# Patient Record
Sex: FEMALE | Race: WHITE | NOT HISPANIC OR LATINO | Employment: OTHER | URBAN - METROPOLITAN AREA
[De-identification: names, ages, dates, MRNs, and addresses within clinical notes are randomized per-mention and may not be internally consistent; named-entity substitution may affect disease eponyms.]

---

## 2019-02-19 ENCOUNTER — OFFICE VISIT (OUTPATIENT)
Dept: FAMILY MEDICINE CLINIC | Facility: CLINIC | Age: 84
End: 2019-02-19

## 2019-02-19 VITALS
DIASTOLIC BLOOD PRESSURE: 80 MMHG | RESPIRATION RATE: 16 BRPM | HEART RATE: 83 BPM | WEIGHT: 171 LBS | OXYGEN SATURATION: 98 % | HEIGHT: 59 IN | SYSTOLIC BLOOD PRESSURE: 134 MMHG | BODY MASS INDEX: 34.47 KG/M2

## 2019-02-19 DIAGNOSIS — M25.511 CHRONIC PAIN OF BOTH SHOULDERS: ICD-10-CM

## 2019-02-19 DIAGNOSIS — Z00.00 MEDICARE ANNUAL WELLNESS VISIT, INITIAL: Primary | ICD-10-CM

## 2019-02-19 DIAGNOSIS — M79.89 RIGHT LEG SWELLING: ICD-10-CM

## 2019-02-19 DIAGNOSIS — M25.512 CHRONIC PAIN OF BOTH SHOULDERS: ICD-10-CM

## 2019-02-19 DIAGNOSIS — M48.00 SPINAL STENOSIS, UNSPECIFIED SPINAL REGION: ICD-10-CM

## 2019-02-19 DIAGNOSIS — Z86.19 HX OF HEPATITIS: ICD-10-CM

## 2019-02-19 DIAGNOSIS — G89.29 CHRONIC PAIN OF BOTH SHOULDERS: ICD-10-CM

## 2019-02-19 DIAGNOSIS — R53.82 CHRONIC FATIGUE: ICD-10-CM

## 2019-02-19 DIAGNOSIS — M62.81 HAND MUSCLE WEAKNESS: ICD-10-CM

## 2019-02-19 PROBLEM — K75.9 HEPATITIS: Status: ACTIVE | Noted: 2019-02-19

## 2019-02-19 PROBLEM — M15.9 OSTEOARTHRITIS OF MULTIPLE JOINTS: Status: ACTIVE | Noted: 2019-02-19

## 2019-02-19 PROCEDURE — 99214 OFFICE O/P EST MOD 30 MIN: CPT | Performed by: FAMILY MEDICINE

## 2019-02-19 PROCEDURE — G0439 PPPS, SUBSEQ VISIT: HCPCS | Performed by: FAMILY MEDICINE

## 2019-02-19 RX ORDER — MELOXICAM 7.5 MG/1
7.5 TABLET ORAL DAILY
Refills: 1 | COMMUNITY
Start: 2019-01-17 | End: 2019-05-23

## 2019-02-19 NOTE — PATIENT INSTRUCTIONS
Medicare Wellness  Personal Prevention Plan of Service   I will call you with results.   Date of Office Visit:  2019  Encounter Provider:  Hank Saunders MD  Place of Service:  [facility]  Patient Name: Erum Yang  :  1933    As part of the Medicare Wellness portion of your visit today, we are providing you with this personalized preventive plan of services (PPPS). This plan is based upon recommendations of the [place] Preventive Services Task Force (USPSTF) and the Advisory Committee on Immunization Practices (ACIP).    This lists the preventive care services that should be considered, and provides dates of when you are due. Items listed as completed are up-to-date and do not require any further intervention.    Health Maintenance   Topic Date Due   • TDAP/TD VACCINES (1 - Tdap) 1952   • ZOSTER VACCINE (1 of 2) 1983   • PNEUMOCOCCAL VACCINES (65+ LOW/MEDIUM RISK) (1 of 2 - PCV13) 1998   • ANNUAL PHYSICAL  2020   • INFLUENZA VACCINE  Completed       Orders Placed This Encounter   Procedures   • CBC (No Diff)   • Comprehensive Metabolic Panel   • Lipid Panel With / Chol / HDL Ratio   • Hepatitis C Antibody   • Ambulatory Referral to Orthopedic Surgery     Referral Priority:   Routine     Referral Type:   Consultation     Referral Reason:   Specialty Services Required     Referred to Provider:   Artem Franco MD     Requested Specialty:   Orthopedic Surgery     Number of Visits Requested:   1       Return in about 3 months (around 2019) for re-evaluation.

## 2019-02-19 NOTE — PROGRESS NOTES
Medicare Subsequent Wellness Visit  Subjective   History of Present Illness    Erum Yang is a 85 y.o. female who presents for an Medicare Wellness Visit. In addition, we addressed the following health issues:  She is very fatigued and wants to be evaluated for why she might be feeling that way.  Shoulder OA:  Decreased ROM last October, saw Dr. Bhandari, received a cortisone injection in each shoulder. This was initially helpful. However, for the last few weeks, both shoulders have been more painful and she has noticed further limited ROM. She also now has bilateral wrist and hand pain along with bilateral hand weakness. States it is diffucult to brush her hair and drive due to the arm pain. Has tried Meloxicam and TENS unit for pain relief, states neither were remarkably helpful. Stopped PT a few weeks ago due to pain. She is interested in having this shoulder examined to see if it is Synovitis and requires anitbiotic treatment. Denies fever, chills, swelling, or erythema.     R hip OA  Intermittent R hip pain. States this developed approximately 6 mo ago. She saw Dr. Bhandari for this and had x-rays but not a cortisone injection. States R hip pain tolerable but it does keep her from traveling and walking more. She loves traveling and lived in [place] for a year.     R foot/lower leg swelling.   Noticed swelling from foot to mid calf approx 1 mo ago. Swelling always present even when she elevates her legs. Denies pain at rest or with ambulation. States she cannot wear her normal shoes bc of swelling. Denies recent car trips or flights. Denies calf pain or tenderness.  Had foot surgery 5 years ago but has not had any swelling since then until now.       She is interested in having blood work today. [facility] She worked as an ICU nurse and was told then that she was positive for hepatitis but that she did not need treatment as her antibody count was high. She thinks that it might that she had  Hepatitits B.        PMH, PSH, SocHx, FamHx, Allergies, and Medications: Reviewed and updated in the history section of chart.  History reviewed. No pertinent family history.    Social History     Social History Narrative    Lives at home with sister.        No Known Allergies    Outpatient Medications Prior to Visit   Medication Sig Dispense Refill   • meloxicam (MOBIC) 7.5 MG tablet Take 7.5 mg by mouth Daily.  1     No facility-administered medications prior to visit.         Patient Active Problem List   Diagnosis   • Osteoarthritis of multiple joints   • Hx of hepatitis         Patient Care Team:  Hank Saunders MD as PCP - General (Family Medicine)  Health Habits:  Current Diet: Well balanced diet  Dental Exam. UTD  Eye Exam. Needs to schedule  Exercise: Denies currently  Current exercise activities include: went to PT 3xweek for several months for shoulder and hip pain.     Recent Hospitalizations:  none    Age-appropriate Screening Schedule:  Refer to the list below for future screening recommendations based on patient's age. Orders for these recommended tests are listed in the plan section. The patient has been provided with a written plan.    Health Maintenance   Topic Date Due   • TDAP/TD VACCINES (1 - Tdap) 12/12/1952   • ZOSTER VACCINE (1 of 2) 12/12/1983   • PNEUMOCOCCAL VACCINES (65+ LOW/MEDIUM RISK) (1 of 2 - PCV13) 12/12/1998   • ANNUAL PHYSICAL  02/20/2020   • INFLUENZA VACCINE  Completed       Depression Screen:   PHQ-2/PHQ-9 Depression Screening 2/19/2019   Little interest or pleasure in doing things 0   Feeling down, depressed, or hopeless 0   Total Score 0       Functional and Cognitive Screening:  Functional & Cognitive Status 2/19/2019   Do you have difficulty preparing food and eating? No   Do you have difficulty bathing yourself, getting dressed or grooming yourself? No   Do you have difficulty using the toilet? No   Do you have difficulty moving around from place to place? Yes   Do  "you have trouble with steps or getting out of a bed or a chair? Yes   In the past year have you fallen or experienced a near fall? Yes   Current Diet Well Balanced Diet   Dental Exam Up to date   Eye Exam Not up to date   Exercise (times per week) 0 times per week   Current Exercise Activities Include None   Do you need help using the phone?  No   Are you deaf or do you have serious difficulty hearing?  Yes   Do you need help with transportation? No   Do you need help shopping? No   Do you need help preparing meals?  No   Do you need help with housework?  Yes   Do you need help with laundry? Yes   Do you need help taking your medications? No   Do you need help managing money? No   Do you ever drive or ride in a car without wearing a seat belt? No   Have you felt unusual stress, anger or loneliness in the last month? No   Who do you live with? Sibling   If you need help, do you have trouble finding someone available to you? No   Have you been bothered in the last four weeks by sexual problems? No   Do you have difficulty concentrating, remembering or making decisions? No     Does the patient have evidence of cognitive impairment? none      Review of Systems   Constitutional: Negative.    HENT: Negative.    Eyes: Negative.    Respiratory: Negative.    Cardiovascular: Negative.    Gastrointestinal: Negative.    Endocrine: Negative.    Genitourinary: Negative.    Musculoskeletal: Positive for back pain and joint swelling.   Skin: Negative.    Allergic/Immunologic: Negative.    Neurological: Negative.    Hematological: Negative.    Psychiatric/Behavioral: Negative.        Objective     Vitals:    02/19/19 1414   BP: 134/80   Pulse: 83   Resp: 16   SpO2: 98%   Weight: 77.6 kg (171 lb)   Height: 149.9 cm (59\")       Body mass index is 34.54 kg/m².    PHYSICAL EXAM  Vitals reviewed and on chart.  HEENT: PERRLA, EOMI. Oral mucosa moist,   No LAD.  CV: RRR, systolic murmurs, rubs, clicks or gallops  LUNGS: CTA " bilaterally  EXT: Edema in right leg, unable to raise arms at shoulders, weakness of bilateral hands 2/5. She walks bent over with hands on thighs.   NEURO: CN II - XII grossly intact        ASSESSMENT AND PLAN      Problem List Items Addressed This Visit        Other    Hx of hepatitis      Other Visit Diagnoses     Medicare annual wellness visit, initial    -  Primary    Relevant Orders    Comprehensive Metabolic Panel    Lipid Panel With / Chol / HDL Ratio    Hepatitis C Antibody    Right leg swelling        Relevant Orders    Duplex Venous Lower Extremity - Right CAR    Chronic pain of both shoulders        Relevant Orders    Ambulatory Referral to Orthopedic Surgery (Completed)    Hand muscle weakness        Relevant Orders    Ambulatory Referral to Orthopedic Surgery (Completed)    Chronic fatigue        Relevant Orders    CBC (No Diff)    Spinal stenosis, unspecified spinal region              Orders:  Orders Placed This Encounter   Procedures   • CBC (No Diff)   • Comprehensive Metabolic Panel   • Lipid Panel With / Chol / HDL Ratio   • Hepatitis C Antibody   • Ambulatory Referral to Orthopedic Surgery       Follow Up:  Return in about 3 months (around 5/19/2019) for re-evaluation     ADVANCED DIRECTIVES: yes and will send copy to the office.     An After Visit Summary and PPPS with all of these plans were given to the patient.

## 2019-02-20 LAB
ALBUMIN SERPL-MCNC: 4 G/DL (ref 3.5–4.7)
ALBUMIN/GLOB SERPL: 1.4 {RATIO} (ref 1.2–2.2)
ALP SERPL-CCNC: 110 IU/L (ref 39–117)
ALT SERPL-CCNC: 10 IU/L (ref 0–32)
AST SERPL-CCNC: 12 IU/L (ref 0–40)
BILIRUB SERPL-MCNC: 0.5 MG/DL (ref 0–1.2)
BUN SERPL-MCNC: 14 MG/DL (ref 8–27)
BUN/CREAT SERPL: 25 (ref 12–28)
CALCIUM SERPL-MCNC: 9.3 MG/DL (ref 8.7–10.3)
CHLORIDE SERPL-SCNC: 99 MMOL/L (ref 96–106)
CHOLEST SERPL-MCNC: 164 MG/DL (ref 100–199)
CHOLEST/HDLC SERPL: 2.4 RATIO (ref 0–4.4)
CO2 SERPL-SCNC: 23 MMOL/L (ref 20–29)
CREAT SERPL-MCNC: 0.56 MG/DL (ref 0.57–1)
ERYTHROCYTE [DISTWIDTH] IN BLOOD BY AUTOMATED COUNT: 14.6 % (ref 12.3–15.4)
GLOBULIN SER CALC-MCNC: 2.8 G/DL (ref 1.5–4.5)
GLUCOSE SERPL-MCNC: 87 MG/DL (ref 65–99)
HCT VFR BLD AUTO: 33.7 % (ref 34–46.6)
HCV AB S/CO SERPL IA: <0.1 S/CO RATIO (ref 0–0.9)
HDLC SERPL-MCNC: 69 MG/DL
HGB BLD-MCNC: 11.2 G/DL (ref 11.1–15.9)
LDLC SERPL CALC-MCNC: 85 MG/DL (ref 0–99)
MCH RBC QN AUTO: 27.5 PG (ref 26.6–33)
MCHC RBC AUTO-ENTMCNC: 33.2 G/DL (ref 31.5–35.7)
MCV RBC AUTO: 83 FL (ref 79–97)
PLATELET # BLD AUTO: 398 X10E3/UL (ref 150–379)
POTASSIUM SERPL-SCNC: 4.4 MMOL/L (ref 3.5–5.2)
PROT SERPL-MCNC: 6.8 G/DL (ref 6–8.5)
RBC # BLD AUTO: 4.08 X10E6/UL (ref 3.77–5.28)
SODIUM SERPL-SCNC: 138 MMOL/L (ref 134–144)
TRIGL SERPL-MCNC: 51 MG/DL (ref 0–149)
VLDLC SERPL CALC-MCNC: 10 MG/DL (ref 5–40)
WBC # BLD AUTO: 8.6 X10E3/UL (ref 3.4–10.8)

## 2019-03-05 ENCOUNTER — HOSPITAL ENCOUNTER (OUTPATIENT)
Dept: CARDIOLOGY | Facility: HOSPITAL | Age: 84
Discharge: HOME | End: 2019-03-05
Admitting: FAMILY MEDICINE

## 2019-03-05 DIAGNOSIS — M79.89 RIGHT LEG SWELLING: ICD-10-CM

## 2019-03-05 LAB
BH CV LOW VAS RIGHT SAPHENOFEMORAL JUNCTION SPONT: 1
BH CV LOWER VASCULAR RIGHT COMMON FEMORAL AUGMENT: NORMAL
BH CV LOWER VASCULAR RIGHT COMMON FEMORAL COMPETENT: NORMAL
BH CV LOWER VASCULAR RIGHT COMMON FEMORAL COMPRESS: NORMAL
BH CV LOWER VASCULAR RIGHT COMMON FEMORAL PHASIC: NORMAL
BH CV LOWER VASCULAR RIGHT COMMON FEMORAL SPONT: NORMAL
BH CV LOWER VASCULAR RIGHT DISTAL FEMORAL COMPRESS: NORMAL
BH CV LOWER VASCULAR RIGHT GASTRONEMIUS COMPRESS: NORMAL
BH CV LOWER VASCULAR RIGHT GREATER SAPH AK COMPRESS: NORMAL
BH CV LOWER VASCULAR RIGHT GREATER SAPH BK COMPRESS: NORMAL
BH CV LOWER VASCULAR RIGHT LESSER SAPH COMPRESS: NORMAL
BH CV LOWER VASCULAR RIGHT MID FEMORAL AUGMENT: NORMAL
BH CV LOWER VASCULAR RIGHT MID FEMORAL COMPETENT: NORMAL
BH CV LOWER VASCULAR RIGHT MID FEMORAL COMPRESS: NORMAL
BH CV LOWER VASCULAR RIGHT MID FEMORAL PHASIC: NORMAL
BH CV LOWER VASCULAR RIGHT MID FEMORAL SPONT: NORMAL
BH CV LOWER VASCULAR RIGHT PERONEAL COMPRESS: NORMAL
BH CV LOWER VASCULAR RIGHT POPLITEAL AUGMENT: NORMAL
BH CV LOWER VASCULAR RIGHT POPLITEAL COMPETENT: NORMAL
BH CV LOWER VASCULAR RIGHT POPLITEAL COMPRESS: NORMAL
BH CV LOWER VASCULAR RIGHT POPLITEAL PHASIC: NORMAL
BH CV LOWER VASCULAR RIGHT POPLITEAL SPONT: NORMAL
BH CV LOWER VASCULAR RIGHT POSTERIOR TIBIAL COMPRESS: NORMAL
BH CV LOWER VASCULAR RIGHT PROXIMAL FEMORAL COMPRESS: NORMAL
BH CV LOWER VASCULAR RIGHT SAPHENOFEMORAL JUNCTION AUGMENT: NORMAL
BH CV LOWER VASCULAR RIGHT SAPHENOFEMORAL JUNCTION COMPETENT: NORMAL
BH CV LOWER VASCULAR RIGHT SAPHENOFEMORAL JUNCTION COMPRESS: NORMAL
BH CV LOWER VASCULAR RIGHT SAPHENOFEMORAL JUNCTION PHASIC: NORMAL
BH CV LOWER VASCULAR RIGHT SAPHENOFEMORAL JUNCTION SPONT: NORMAL

## 2019-03-05 PROCEDURE — 93971 EXTREMITY STUDY: CPT

## 2019-05-23 ENCOUNTER — OFFICE VISIT (OUTPATIENT)
Dept: FAMILY MEDICINE CLINIC | Facility: CLINIC | Age: 84
End: 2019-05-23

## 2019-05-23 VITALS
OXYGEN SATURATION: 96 % | SYSTOLIC BLOOD PRESSURE: 132 MMHG | BODY MASS INDEX: 35.88 KG/M2 | WEIGHT: 178 LBS | HEART RATE: 77 BPM | DIASTOLIC BLOOD PRESSURE: 84 MMHG | HEIGHT: 59 IN

## 2019-05-23 DIAGNOSIS — R53.83 FATIGUE, UNSPECIFIED TYPE: ICD-10-CM

## 2019-05-23 DIAGNOSIS — H05.223 EDEMA OF BOTH ORBITS: ICD-10-CM

## 2019-05-23 DIAGNOSIS — I87.2 VENOUS INSUFFICIENCY OF RIGHT LEG: Primary | ICD-10-CM

## 2019-05-23 DIAGNOSIS — M25.551 RIGHT HIP PAIN: ICD-10-CM

## 2019-05-23 PROCEDURE — 99214 OFFICE O/P EST MOD 30 MIN: CPT | Performed by: FAMILY MEDICINE

## 2019-05-23 NOTE — PROGRESS NOTES
Subjective   Erum Yang is a 85 y.o. female.     History of Present Illness     Erum is here today for right hip pain. She reports that it has gotten worse since February. She has tried meloxicam and is only taking Tylenol right now. She cannot get up easily. She has pain with walking.   Her right leg is chronically swollen and was evaluated after last visit. She has chronic venous insufficiency. Ambulation is difficult so she is asking for a transport chair.  She is very tired and I have noticed that she has edema of both orbits along with thickening of skin on lower extremities.  She has never been tested for a thyroid disorder.    The following portions of the patient's history were reviewed and updated as appropriate: allergies, current medications, past family history, past medical history, past social history, past surgical history and problem list.    Review of Systems   Constitutional: Positive for fatigue.   HENT: Negative.    Eyes: Negative.    Respiratory: Negative.    Cardiovascular: Negative.    Gastrointestinal: Negative.    Endocrine: Negative.    Genitourinary: Negative.    Musculoskeletal: Positive for arthralgias, gait problem and myalgias.   Skin: Negative.    Allergic/Immunologic: Negative.    Hematological: Negative.    Psychiatric/Behavioral: Negative.        Objective   Physical Exam   Constitutional: She is oriented to person, place, and time. She appears well-nourished. No distress.   HENT:   Head: Normocephalic and atraumatic.   Eyes: EOM are normal. Pupils are equal, round, and reactive to light.   Edema under both eyes   Neck: Normal range of motion.   Cardiovascular: Normal rate and regular rhythm.   Pulmonary/Chest: Effort normal and breath sounds normal. No respiratory distress.   Musculoskeletal: She exhibits edema.   Right leg is edematous.   Neurological: She is alert and oriented to person, place, and time.   Skin: Skin is warm and dry. No rash noted.   Psychiatric: She has a normal  mood and affect. Her behavior is normal. Judgment and thought content normal.   Nursing note and vitals reviewed.      Assessment/Plan   Erum was seen today for hip pain.    Diagnoses and all orders for this visit:    Venous insufficiency of right leg  Evaluated and no clot noted. I had advised compression socks but she cannot take them off and on.     Right hip pain  -     Ambulatory Referral to Orthopedic Surgery    Edema of both orbits  Fatigue, unspecified type  I will check a thyroid level to see if this is the problem.  -     TSH

## 2019-05-24 LAB — TSH SERPL DL<=0.005 MIU/L-ACNC: 1.09 UIU/ML (ref 0.45–4.5)

## 2019-06-14 ENCOUNTER — OFFICE VISIT (OUTPATIENT)
Dept: ORTHOPEDIC SURGERY | Facility: CLINIC | Age: 84
End: 2019-06-14

## 2019-06-14 VITALS — BODY MASS INDEX: 31.28 KG/M2 | WEIGHT: 170 LBS | HEIGHT: 62 IN

## 2019-06-14 DIAGNOSIS — M16.11 PRIMARY OSTEOARTHRITIS OF RIGHT HIP: Primary | ICD-10-CM

## 2019-06-14 PROCEDURE — 99204 OFFICE O/P NEW MOD 45 MIN: CPT | Performed by: NURSE PRACTITIONER

## 2019-06-17 RX ORDER — PREGABALIN 75 MG/1
150 CAPSULE ORAL ONCE
Status: CANCELLED | OUTPATIENT
Start: 2019-08-12 | End: 2019-06-17

## 2019-06-17 RX ORDER — CEFAZOLIN SODIUM 2 G/100ML
2 INJECTION, SOLUTION INTRAVENOUS ONCE
Status: CANCELLED | OUTPATIENT
Start: 2019-08-12 | End: 2019-06-17

## 2019-06-17 NOTE — PROGRESS NOTES
"Patient: Erum Yang  YOB: 1933 85 y.o. female  Medical Record Number: [id number]    Chief Complaints:   Chief Complaint   Patient presents with   • Right Hip - Follow-up, Pain       History of Present Illness:Erum Yang is a 85 y.o. female who presents as a new patient both myself as well as to the practice with complaints of right hip pain.  Patient reports that it started about 8 months ago and has progressively gotten worse.  Describes the hip pain as a moderate intermittent grinding type pain with clicking, severe with walking, only slightly better with rest.    Allergies: No Known Allergies    Medications:   No current outpatient medications on file.     No current facility-administered medications for this visit.          The following portions of the patient's history were reviewed and updated as appropriate: allergies, current medications, past family history, past medical history, past social history, past surgical history and problem list.    Review of Systems:   A 14 point review of systems was performed. All systems negative except pertinent positives/negative listed in HPI above    Physical Exam:   Vitals:    06/14/19 1603   Weight: 77.1 kg (170 lb)   Height: 157.5 cm (62\")       General: A and O x 3, ASA, NAD    SCLERA:    Normal    DENTITION:   Normal  Skin clear no unusual lesions noted  Right hip patient is nontender palpation she has pain with internal and external rotation with a positive central positive logroll calf soft nontender    Radiology:  Xrays previous x-rays of the right hip were reviewed show bone-on-bone end-stage osteoarthritis with cyst and spur formation.    Assessment/Plan:  EndStage osteoarthritis right hip    Dr. Archuleta saw the patient as well  Continuation of conservative management vs. CY discussed.  The patient wishes to proceed with total hip replacement.  At this point the patient has failed the full gamut of conservative treatment and stating complete " understanding of the risks/benefits/ anternatives wishes to proceed with surgical treatment.    Risk and benefits of surgery were reviewed.  Including, but not limited to, blood clots, anesthesia risk, infection, leg length discrepancy, fracture, skin/leg numbness, failure of the implant, need for future surgeries, continued pain, hematoma, need for transfusion, and death, among others.  The patient understands and wishes to proceed.     The spectrum of treatment options were discussed with the patient in detail including both the nonoperative and operative treatment modalities and their respective risks and benefits.  After thorough discussion, the patient has elected to undergo surgical treatment.  The details of the surgical procedure were explained including the location of probable incisions and a description of the likely implants to be used.  Models and diagrams were used as educational resources. The patient understands the likely convalescence after surgery, as well as the rehabilitation required.  We thoroughly discussed the risks, benefits, and alternatives to surgery.  The risks include but are not limited to the risk of infection, joint stiffness, blood clots (including DVT and/or pulmonary embolus along with the risk of death), neurologic and/or vascular injury, fracture, dislocation, nonunion, malunion, need for further surgery including hardware failure requiring revision, and continued pain.  It was explained that if tissue has been repaired or reconstructed, there is also a chance of failure which may require further management.  Following the completion of the discussion, the patient expressed understanding of this planned course of care, all their questions were answered and consent will be obtained preoperatively.    Operative Plan: Posterior approach Total Hip Replacement a 3 day staywith inpatient rehab

## 2019-06-25 PROBLEM — M16.11 PRIMARY OSTEOARTHRITIS OF RIGHT HIP: Status: ACTIVE | Noted: 2019-06-25

## 2019-08-01 ENCOUNTER — APPOINTMENT (OUTPATIENT)
Dept: PREADMISSION TESTING | Facility: HOSPITAL | Age: 84
End: 2019-08-01

## 2019-08-01 VITALS
OXYGEN SATURATION: 95 % | DIASTOLIC BLOOD PRESSURE: 85 MMHG | BODY MASS INDEX: 29.77 KG/M2 | HEIGHT: 63 IN | WEIGHT: 168 LBS | SYSTOLIC BLOOD PRESSURE: 131 MMHG | HEART RATE: 77 BPM | RESPIRATION RATE: 16 BRPM | TEMPERATURE: 98.4 F

## 2019-08-01 DIAGNOSIS — M16.11 PRIMARY OSTEOARTHRITIS OF RIGHT HIP: ICD-10-CM

## 2019-08-01 LAB
ANION GAP SERPL CALCULATED.3IONS-SCNC: 12.1 MMOL/L (ref 5–15)
BILIRUB UR QL STRIP: NEGATIVE
BUN BLD-MCNC: 11 MG/DL (ref 8–23)
BUN/CREAT SERPL: 23.4 (ref 7–25)
CALCIUM SPEC-SCNC: 9.3 MG/DL (ref 8.6–10.5)
CHLORIDE SERPL-SCNC: 98 MMOL/L (ref 98–107)
CLARITY UR: CLEAR
CO2 SERPL-SCNC: 25.9 MMOL/L (ref 22–29)
COLOR UR: YELLOW
CREAT BLD-MCNC: 0.47 MG/DL (ref 0.57–1)
DEPRECATED RDW RBC AUTO: 46.1 FL (ref 37–54)
ERYTHROCYTE [DISTWIDTH] IN BLOOD BY AUTOMATED COUNT: 14.4 % (ref 12.3–15.4)
GFR SERPL CREATININE-BSD FRML MDRD: 126 ML/MIN/1.73
GLUCOSE BLD-MCNC: 85 MG/DL (ref 65–99)
GLUCOSE UR STRIP-MCNC: NEGATIVE MG/DL
HCT VFR BLD AUTO: 36.2 % (ref 34–46.6)
HGB BLD-MCNC: 11.3 G/DL (ref 12–15.9)
HGB UR QL STRIP.AUTO: NEGATIVE
KETONES UR QL STRIP: NEGATIVE
LEUKOCYTE ESTERASE UR QL STRIP.AUTO: NEGATIVE
MCH RBC QN AUTO: 27.3 PG (ref 26.6–33)
MCHC RBC AUTO-ENTMCNC: 31.2 G/DL (ref 31.5–35.7)
MCV RBC AUTO: 87.4 FL (ref 79–97)
NITRITE UR QL STRIP: NEGATIVE
PH UR STRIP.AUTO: 7.5 [PH] (ref 5–8)
PLATELET # BLD AUTO: 368 10*3/MM3 (ref 140–450)
PMV BLD AUTO: 10 FL (ref 6–12)
POTASSIUM BLD-SCNC: 4.1 MMOL/L (ref 3.5–5.2)
PROT UR QL STRIP: NEGATIVE
RBC # BLD AUTO: 4.14 10*6/MM3 (ref 3.77–5.28)
SODIUM BLD-SCNC: 136 MMOL/L (ref 136–145)
SP GR UR STRIP: 1.01 (ref 1–1.03)
UROBILINOGEN UR QL STRIP: NORMAL
WBC NRBC COR # BLD: 5.78 10*3/MM3 (ref 3.4–10.8)

## 2019-08-01 PROCEDURE — 93005 ELECTROCARDIOGRAM TRACING: CPT

## 2019-08-01 PROCEDURE — 93010 ELECTROCARDIOGRAM REPORT: CPT | Performed by: INTERNAL MEDICINE

## 2019-08-01 PROCEDURE — 81003 URINALYSIS AUTO W/O SCOPE: CPT | Performed by: NURSE PRACTITIONER

## 2019-08-01 PROCEDURE — 85027 COMPLETE CBC AUTOMATED: CPT | Performed by: ORTHOPAEDIC SURGERY

## 2019-08-01 PROCEDURE — 36415 COLL VENOUS BLD VENIPUNCTURE: CPT

## 2019-08-01 PROCEDURE — 80048 BASIC METABOLIC PNL TOTAL CA: CPT | Performed by: ORTHOPAEDIC SURGERY

## 2019-08-01 RX ORDER — CHLORHEXIDINE GLUCONATE 500 MG/1
1 CLOTH TOPICAL TAKE AS DIRECTED
COMMUNITY
End: 2019-08-15 | Stop reason: HOSPADM

## 2019-08-01 ASSESSMENT — HOOS JR
HOOS JR SCORE: 12
HOOS JR SCORE: 52.965

## 2019-08-01 NOTE — DISCHARGE INSTRUCTIONS
Take the following medications the morning of surgery with a small sip of water:    NONE    TIME OF ARRIVAL WILL BE TOLD TO YOU BY DR MENDOZA OFFICE    General Instructions:  • Do not eat solid food after midnight the night before surgery.  • You may drink clear liquids day of surgery but must stop at least one hour before your hospital arrival time.  • It is beneficial for you to have a clear drink that contains carbohydrates the day of surgery.  We suggest a 12 to 20 ounce bottle of Gatorade or Powerade for non-diabetic patients or a 12 to 20 ounce bottle of G2 or Powerade Zero for diabetic patients. (Pediatric patients, are not advised to drink a 12 to 20 ounce carbohydrate drink)    Clear liquids are liquids you can see through.  Nothing red in color.     Plain water                               Sports drinks  Sodas                                   Gelatin (Jell-O)  Fruit juices without pulp such as white grape juice and apple juice  Popsicles that contain no fruit or yogurt  Tea or coffee (no cream or milk added)  Gatorade / Powerade  G2 / Powerade Zero    • Infants may have breast milk up to four hours before surgery.  • Infants drinking formula may drink formula up to six hours before surgery.   • Patients who avoid smoking, chewing tobacco and alcohol for 4 weeks prior to surgery have a reduced risk of post-operative complications.  Quit smoking as many days before surgery as you can.  • Do not smoke, use chewing tobacco or drink alcohol the day of surgery.   • If applicable bring your C-PAP/ BI-PAP machine.  • Bring any papers given to you in the doctor’s office.  • Wear clean comfortable clothes and socks.  • Do not wear contact lenses, false eyelashes or make-up.  Bring a case for your glasses.   • Bring crutches or walker if applicable.  • Remove all piercings.  Leave jewelry and any other valuables at home.  • Hair extensions with metal clips must be removed prior to surgery.  • The Pre-Admission  Testing nurse will instruct you to bring medications if unable to obtain an accurate list in Pre-Admission Testing.        If you were given a blood bank ID arm band remember to bring it with you the day of surgery.    Preventing a Surgical Site Infection:  • For 2 to 3 days before surgery, avoid shaving with a razor because the razor can irritate skin and make it easier to develop an infection.    • Any areas of open skin can increase the risk of a post-operative wound infection by allowing bacteria to enter and travel throughout the body.  Notify your surgeon if you have any skin wounds / rashes even if it is not near the expected surgical site.  The area will need assessed to determine if surgery should be delayed until it is healed.  • The night prior to surgery sleep in a clean bed with clean clothing.  Do not allow pets to sleep with you.  • Shower on the morning of surgery using a fresh bar of anti-bacterial soap (such as Dial) and clean washcloth.  Dry with a clean towel and dress in clean clothing.  • Ask your surgeon if you will be receiving antibiotics prior to surgery.  • Make sure you, your family, and all healthcare providers clean their hands with soap and water or an alcohol based hand  before caring for you or your wound.    Day of surgery:  Upon arrival, a Pre-op nurse and Anesthesiologist will review your health history, obtain vital signs, and answer questions you may have.  The only belongings needed at this time will be a list of your home medications and if applicable your C-PAP/BI-PAP machine.  If you are staying overnight your family can leave the rest of your belongings in the car and bring them to your room later.  A Pre-op nurse will start an IV and you may receive medication in preparation for surgery, including something to help you relax.  Your family will be able to see you in the Pre-op area.  While you are in surgery your family should notify the waiting room   if they leave the waiting room area and provide a contact phone number.    Please be aware that surgery does come with discomfort.  We want to make every effort to control your discomfort so please discuss any uncontrolled symptoms with your nurse.   Your doctor will most likely have prescribed pain medications.      If you are going home after surgery you will receive individualized written care instructions before being discharged.  A responsible adult must drive you to and from the hospital on the day of your surgery and stay with you for 24 hours.    If you are staying overnight following surgery, you will be transported to your hospital room following the recovery period.  [facility] has all private rooms.    You have received a list of surgical assistants for your reference.  If you have any questions please call Pre-Admission Testing at [phone].  Deductibles and co-payments are collected on the day of service. Please be prepared to pay the required co-pay, deductible or deposit on the day of service as defined by your plan.    2% CHLORAHEXIDINE GLUCONATE* CLOTH  Preparing or “prepping” skin before surgery can reduce the risk of infection at the surgical site. To make the process easier, [facility] has chosen disposable cloths moistened with a rinse-free, 2% Chlorhexidine Gluconate (CHG) antiseptic solution. The steps below outline the prepping process and should be carefully followed.        Use the prep cloth on the area that is circled in the diagram             Directions Night before Surgery  1) Shower using a fresh bar of anti-bacterial soap (such as Dial) and clean washcloth.  Use a clean towel to completely dry your skin.  2) Do not use any lotions, oils or creams on your skin.  3) Open the package and remove 1 cloth, wipe your skin for 30 seconds in a circular motion.  Allow to dry for 3 minutes.  4) Repeat #3 with second cloth.  5) Do not touch your eyes, ears, or mouth  with the prep cloth.  6) Allow the wet prep solution to air dry.  7) Discard the prep cloth and wash your hands with soap and water.   8) Dress in clean bed clothes and sleep on fresh clean bed sheets.   9) You may experience some temporary itching after the prep.    Directions Day of Surgery  1) Repeat steps 1,2,3,4,5,6,7, and 9.   2) Dress in clean clothes before coming to the hospital.    BACTROBAN NASAL OINTMENT  There are many germs normally in your nose. Bactroban is an ointment that will help reduce these germs. Please follow these instructions for Bactroban use:      ____The day before surgery in the morning  Date________    ____The day before surgery in the evening              Date________    ____The day of surgery in the morning    Date________    **Squirt ½ package of Bactroban Ointment onto a cotton applicator and apply to inside of 1st nostril.  Squirt the remaining Bactroban and apply to the inside of the other nostril.    PERIDEX- ORAL:  Use only if your surgeon has ordered  Use the night before and morning of surgery - Swish, gargle, and spit - do not swallow.

## 2019-08-08 ENCOUNTER — OFFICE VISIT (OUTPATIENT)
Dept: ORTHOPEDIC SURGERY | Facility: CLINIC | Age: 84
End: 2019-08-08

## 2019-08-08 VITALS
HEIGHT: 62 IN | TEMPERATURE: 97.4 F | WEIGHT: 168 LBS | SYSTOLIC BLOOD PRESSURE: 148 MMHG | DIASTOLIC BLOOD PRESSURE: 72 MMHG | BODY MASS INDEX: 30.91 KG/M2

## 2019-08-08 DIAGNOSIS — M16.11 PRIMARY OSTEOARTHRITIS OF RIGHT HIP: Primary | ICD-10-CM

## 2019-08-08 PROCEDURE — S0260 H&P FOR SURGERY: HCPCS | Performed by: NURSE PRACTITIONER

## 2019-08-08 PROCEDURE — 73502 X-RAY EXAM HIP UNI 2-3 VIEWS: CPT | Performed by: NURSE PRACTITIONER
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
## 2019-08-12 ENCOUNTER — HOSPITAL ENCOUNTER (INPATIENT)
Facility: HOSPITAL | Age: 84
LOS: 3 days | Discharge: NURSING, REHAB OR LONG-TERM CARE | End: 2019-08-15
Attending: ORTHOPAEDIC SURGERY | Admitting: ORTHOPAEDIC SURGERY

## 2019-08-12 ENCOUNTER — ANESTHESIA EVENT (OUTPATIENT)
Dept: PERIOP | Facility: HOSPITAL | Age: 84
End: 2019-08-12

## 2019-08-12 ENCOUNTER — ANESTHESIA (OUTPATIENT)
Dept: PERIOP | Facility: HOSPITAL | Age: 84
End: 2019-08-12

## 2019-08-12 ENCOUNTER — APPOINTMENT (OUTPATIENT)
Dept: GENERAL RADIOLOGY | Facility: HOSPITAL | Age: 84
End: 2019-08-12

## 2019-08-12 DIAGNOSIS — M16.11 PRIMARY OSTEOARTHRITIS OF RIGHT HIP: ICD-10-CM

## 2019-08-12 LAB
ABO GROUP BLD: NORMAL
BLD GP AB SCN SERPL QL: NEGATIVE
RH BLD: POSITIVE
T&S EXPIRATION DATE: NORMAL

## 2019-08-12 PROCEDURE — C9290 INJ, BUPIVACAINE LIPOSOME: HCPCS | Performed by: ORTHOPAEDIC SURGERY

## 2019-08-12 PROCEDURE — C1776 JOINT DEVICE (IMPLANTABLE): HCPCS | Performed by: ORTHOPAEDIC SURGERY

## 2019-08-12 PROCEDURE — 27130 TOTAL HIP ARTHROPLASTY: CPT | Performed by: ORTHOPAEDIC SURGERY

## 2019-08-12 PROCEDURE — 25010000002 FENTANYL CITRATE (PF) 100 MCG/2ML SOLUTION: Performed by: NURSE ANESTHETIST, CERTIFIED REGISTERED

## 2019-08-12 PROCEDURE — 25010000002 VANCOMYCIN PER 500 MG: Performed by: ORTHOPAEDIC SURGERY

## 2019-08-12 PROCEDURE — 25010000002 PROPOFOL 10 MG/ML EMULSION: Performed by: NURSE ANESTHETIST, CERTIFIED REGISTERED

## 2019-08-12 PROCEDURE — 27130 TOTAL HIP ARTHROPLASTY: CPT | Performed by: NURSE PRACTITIONER

## 2019-08-12 PROCEDURE — 25010000003 CEFAZOLIN IN DEXTROSE 2-4 GM/100ML-% SOLUTION: Performed by: NURSE PRACTITIONER

## 2019-08-12 PROCEDURE — 25010000002 ONDANSETRON PER 1 MG: Performed by: NURSE ANESTHETIST, CERTIFIED REGISTERED

## 2019-08-12 PROCEDURE — 25010000002 KETOROLAC TROMETHAMINE PER 15 MG: Performed by: NURSE PRACTITIONER

## 2019-08-12 PROCEDURE — 25010000002 HYDRALAZINE PER 20 MG: Performed by: NURSE ANESTHETIST, CERTIFIED REGISTERED

## 2019-08-12 PROCEDURE — 25010000002 DEXAMETHASONE PER 1 MG: Performed by: NURSE ANESTHETIST, CERTIFIED REGISTERED

## 2019-08-12 PROCEDURE — 86850 RBC ANTIBODY SCREEN: CPT | Performed by: NURSE PRACTITIONER

## 2019-08-12 PROCEDURE — 25010000002 NEOSTIGMINE PER 0.5 MG: Performed by: NURSE ANESTHETIST, CERTIFIED REGISTERED

## 2019-08-12 PROCEDURE — 0SR906A REPLACEMENT OF RIGHT HIP JOINT WITH OXIDIZED ZIRCONIUM ON POLYETHYLENE SYNTHETIC SUBSTITUTE, UNCEMENTED, OPEN APPROACH: ICD-10-PCS | Performed by: ORTHOPAEDIC SURGERY

## 2019-08-12 PROCEDURE — 86901 BLOOD TYPING SEROLOGIC RH(D): CPT | Performed by: NURSE PRACTITIONER

## 2019-08-12 PROCEDURE — 25010000003 BUPIVACAINE LIPOSOME 1.3 % SUSPENSION 20 ML VIAL: Performed by: ORTHOPAEDIC SURGERY

## 2019-08-12 PROCEDURE — 86900 BLOOD TYPING SEROLOGIC ABO: CPT | Performed by: NURSE PRACTITIONER

## 2019-08-12 PROCEDURE — 73501 X-RAY EXAM HIP UNI 1 VIEW: CPT

## 2019-08-12 DEVICE — OXINIUM FEMORAL HEAD 12/14 TAPER                                    36 MM -3
Type: IMPLANTABLE DEVICE | Site: HIP | Status: FUNCTIONAL
Brand: OXINIUM

## 2019-08-12 DEVICE — R3 3 HOLE ACETABULAR SHELL 52MM
Type: IMPLANTABLE DEVICE | Site: ACETABULUM | Status: FUNCTIONAL
Brand: R3 ACETABULAR

## 2019-08-12 DEVICE — REFLECTION SPHERICAL HEAD SCREW 30MM
Type: IMPLANTABLE DEVICE | Site: ACETABULUM | Status: FUNCTIONAL
Brand: REFLECTION

## 2019-08-12 DEVICE — POLARSTEM STANDARD NON-CEMENTED                                    WITH TI/HA 1
Type: IMPLANTABLE DEVICE | Site: HIP | Status: FUNCTIONAL
Brand: POLARSTEM

## 2019-08-12 DEVICE — R3 20 DEGREE XLPE ACETABULAR LINER                                    36MM INNER DIAMETER X OUTER DIAMETER 52MM
Type: IMPLANTABLE DEVICE | Site: ACETABULUM | Status: FUNCTIONAL
Brand: R3

## 2019-08-12 DEVICE — REFLECTION SPHERICAL HEAD SCREW 25MM
Type: IMPLANTABLE DEVICE | Site: ACETABULUM | Status: FUNCTIONAL
Brand: REFLECTION

## 2019-08-12 DEVICE — IMPLANTABLE DEVICE: Type: IMPLANTABLE DEVICE | Site: ACETABULUM | Status: FUNCTIONAL

## 2019-08-12 RX ORDER — FENTANYL CITRATE 50 UG/ML
INJECTION, SOLUTION INTRAMUSCULAR; INTRAVENOUS AS NEEDED
Status: DISCONTINUED | OUTPATIENT
Start: 2019-08-12 | End: 2019-08-12 | Stop reason: SURG

## 2019-08-12 RX ORDER — PANTOPRAZOLE SODIUM 40 MG/1
40 TABLET, DELAYED RELEASE ORAL EVERY MORNING
Status: DISCONTINUED | OUTPATIENT
Start: 2019-08-13 | End: 2019-08-15 | Stop reason: HOSPADM

## 2019-08-12 RX ORDER — FLUMAZENIL 0.1 MG/ML
0.2 INJECTION INTRAVENOUS AS NEEDED
Status: DISCONTINUED | OUTPATIENT
Start: 2019-08-12 | End: 2019-08-12 | Stop reason: HOSPADM

## 2019-08-12 RX ORDER — HYDRALAZINE HYDROCHLORIDE 20 MG/ML
5 INJECTION INTRAMUSCULAR; INTRAVENOUS
Status: DISCONTINUED | OUTPATIENT
Start: 2019-08-12 | End: 2019-08-12 | Stop reason: HOSPADM

## 2019-08-12 RX ORDER — LIDOCAINE HYDROCHLORIDE 20 MG/ML
INJECTION, SOLUTION INFILTRATION; PERINEURAL AS NEEDED
Status: DISCONTINUED | OUTPATIENT
Start: 2019-08-12 | End: 2019-08-12 | Stop reason: SURG

## 2019-08-12 RX ORDER — LABETALOL HYDROCHLORIDE 5 MG/ML
5 INJECTION, SOLUTION INTRAVENOUS
Status: DISCONTINUED | OUTPATIENT
Start: 2019-08-12 | End: 2019-08-12 | Stop reason: HOSPADM

## 2019-08-12 RX ORDER — DEXAMETHASONE SODIUM PHOSPHATE 10 MG/ML
INJECTION INTRAMUSCULAR; INTRAVENOUS AS NEEDED
Status: DISCONTINUED | OUTPATIENT
Start: 2019-08-12 | End: 2019-08-12 | Stop reason: SURG

## 2019-08-12 RX ORDER — ROCURONIUM BROMIDE 10 MG/ML
INJECTION, SOLUTION INTRAVENOUS AS NEEDED
Status: DISCONTINUED | OUTPATIENT
Start: 2019-08-12 | End: 2019-08-12 | Stop reason: SURG

## 2019-08-12 RX ORDER — EPHEDRINE SULFATE 50 MG/ML
INJECTION, SOLUTION INTRAVENOUS AS NEEDED
Status: DISCONTINUED | OUTPATIENT
Start: 2019-08-12 | End: 2019-08-12 | Stop reason: SURG

## 2019-08-12 RX ORDER — FENTANYL CITRATE 50 UG/ML
50 INJECTION, SOLUTION INTRAMUSCULAR; INTRAVENOUS
Status: DISCONTINUED | OUTPATIENT
Start: 2019-08-12 | End: 2019-08-12 | Stop reason: HOSPADM

## 2019-08-12 RX ORDER — LIDOCAINE HYDROCHLORIDE 10 MG/ML
0.5 INJECTION, SOLUTION EPIDURAL; INFILTRATION; INTRACAUDAL; PERINEURAL ONCE AS NEEDED
Status: DISCONTINUED | OUTPATIENT
Start: 2019-08-12 | End: 2019-08-12 | Stop reason: HOSPADM

## 2019-08-12 RX ORDER — MAGNESIUM HYDROXIDE 1200 MG/15ML
LIQUID ORAL AS NEEDED
Status: DISCONTINUED | OUTPATIENT
Start: 2019-08-12 | End: 2019-08-12 | Stop reason: HOSPADM

## 2019-08-12 RX ORDER — MIDAZOLAM HYDROCHLORIDE 1 MG/ML
2 INJECTION INTRAMUSCULAR; INTRAVENOUS
Status: DISCONTINUED | OUTPATIENT
Start: 2019-08-12 | End: 2019-08-12 | Stop reason: HOSPADM

## 2019-08-12 RX ORDER — WOUND DRESSING ADHESIVE - LIQUID
LIQUID MISCELLANEOUS AS NEEDED
Status: DISCONTINUED | OUTPATIENT
Start: 2019-08-12 | End: 2019-08-12 | Stop reason: HOSPADM

## 2019-08-12 RX ORDER — DOCUSATE SODIUM 100 MG/1
100 CAPSULE, LIQUID FILLED ORAL 2 TIMES DAILY
Status: DISCONTINUED | OUTPATIENT
Start: 2019-08-12 | End: 2019-08-15 | Stop reason: HOSPADM

## 2019-08-12 RX ORDER — ONDANSETRON 2 MG/ML
4 INJECTION INTRAMUSCULAR; INTRAVENOUS ONCE AS NEEDED
Status: DISCONTINUED | OUTPATIENT
Start: 2019-08-12 | End: 2019-08-12 | Stop reason: HOSPADM

## 2019-08-12 RX ORDER — CEFAZOLIN SODIUM 2 G/100ML
2 INJECTION, SOLUTION INTRAVENOUS EVERY 8 HOURS
Status: COMPLETED | OUTPATIENT
Start: 2019-08-12 | End: 2019-08-13

## 2019-08-12 RX ORDER — NALOXONE HCL 0.4 MG/ML
0.2 VIAL (ML) INJECTION AS NEEDED
Status: DISCONTINUED | OUTPATIENT
Start: 2019-08-12 | End: 2019-08-12 | Stop reason: HOSPADM

## 2019-08-12 RX ORDER — PREGABALIN 75 MG/1
150 CAPSULE ORAL ONCE
Status: COMPLETED | OUTPATIENT
Start: 2019-08-12 | End: 2019-08-12

## 2019-08-12 RX ORDER — HYDROCODONE BITARTRATE AND ACETAMINOPHEN 7.5; 325 MG/1; MG/1
1 TABLET ORAL ONCE AS NEEDED
Status: DISCONTINUED | OUTPATIENT
Start: 2019-08-12 | End: 2019-08-12 | Stop reason: HOSPADM

## 2019-08-12 RX ORDER — MIDAZOLAM HYDROCHLORIDE 1 MG/ML
1 INJECTION INTRAMUSCULAR; INTRAVENOUS
Status: DISCONTINUED | OUTPATIENT
Start: 2019-08-12 | End: 2019-08-12 | Stop reason: HOSPADM

## 2019-08-12 RX ORDER — HYDROMORPHONE HYDROCHLORIDE 1 MG/ML
0.5 INJECTION, SOLUTION INTRAMUSCULAR; INTRAVENOUS; SUBCUTANEOUS
Status: DISCONTINUED | OUTPATIENT
Start: 2019-08-12 | End: 2019-08-12 | Stop reason: HOSPADM

## 2019-08-12 RX ORDER — VANCOMYCIN HYDROCHLORIDE 1 G/200ML
1 INJECTION, SOLUTION INTRAVENOUS
Status: COMPLETED | OUTPATIENT
Start: 2019-08-12 | End: 2019-08-12

## 2019-08-12 RX ORDER — GLYCOPYRROLATE 0.2 MG/ML
INJECTION INTRAMUSCULAR; INTRAVENOUS AS NEEDED
Status: DISCONTINUED | OUTPATIENT
Start: 2019-08-12 | End: 2019-08-12 | Stop reason: SURG

## 2019-08-12 RX ORDER — HYDROCODONE BITARTRATE AND ACETAMINOPHEN 7.5; 325 MG/1; MG/1
2 TABLET ORAL EVERY 4 HOURS PRN
Status: DISCONTINUED | OUTPATIENT
Start: 2019-08-12 | End: 2019-08-15 | Stop reason: HOSPADM

## 2019-08-12 RX ORDER — SODIUM CHLORIDE, SODIUM LACTATE, POTASSIUM CHLORIDE, CALCIUM CHLORIDE 600; 310; 30; 20 MG/100ML; MG/100ML; MG/100ML; MG/100ML
9 INJECTION, SOLUTION INTRAVENOUS CONTINUOUS
Status: DISCONTINUED | OUTPATIENT
Start: 2019-08-12 | End: 2019-08-12 | Stop reason: HOSPADM

## 2019-08-12 RX ORDER — ACETAMINOPHEN 325 MG/1
650 TABLET ORAL ONCE AS NEEDED
Status: DISCONTINUED | OUTPATIENT
Start: 2019-08-12 | End: 2019-08-12 | Stop reason: HOSPADM

## 2019-08-12 RX ORDER — KETOROLAC TROMETHAMINE 30 MG/ML
15 INJECTION, SOLUTION INTRAMUSCULAR; INTRAVENOUS EVERY 8 HOURS
Status: COMPLETED | OUTPATIENT
Start: 2019-08-12 | End: 2019-08-13

## 2019-08-12 RX ORDER — ACETAMINOPHEN 10 MG/ML
1000 INJECTION, SOLUTION INTRAVENOUS ONCE
Status: COMPLETED | OUTPATIENT
Start: 2019-08-12 | End: 2019-08-12

## 2019-08-12 RX ORDER — CEFAZOLIN SODIUM 2 G/100ML
2 INJECTION, SOLUTION INTRAVENOUS ONCE
Status: COMPLETED | OUTPATIENT
Start: 2019-08-12 | End: 2019-08-12

## 2019-08-12 RX ORDER — ACETAMINOPHEN 325 MG/1
325 TABLET ORAL EVERY 4 HOURS PRN
Status: DISCONTINUED | OUTPATIENT
Start: 2019-08-12 | End: 2019-08-15 | Stop reason: HOSPADM

## 2019-08-12 RX ORDER — FAMOTIDINE 10 MG/ML
20 INJECTION, SOLUTION INTRAVENOUS ONCE
Status: COMPLETED | OUTPATIENT
Start: 2019-08-12 | End: 2019-08-12

## 2019-08-12 RX ORDER — PROPOFOL 10 MG/ML
VIAL (ML) INTRAVENOUS AS NEEDED
Status: DISCONTINUED | OUTPATIENT
Start: 2019-08-12 | End: 2019-08-12 | Stop reason: SURG

## 2019-08-12 RX ORDER — ASPIRIN 325 MG
325 TABLET, DELAYED RELEASE (ENTERIC COATED) ORAL 2 TIMES DAILY
Status: DISCONTINUED | OUTPATIENT
Start: 2019-08-12 | End: 2019-08-15 | Stop reason: HOSPADM

## 2019-08-12 RX ORDER — SODIUM CHLORIDE 0.9 % (FLUSH) 0.9 %
1-10 SYRINGE (ML) INJECTION AS NEEDED
Status: DISCONTINUED | OUTPATIENT
Start: 2019-08-12 | End: 2019-08-12 | Stop reason: HOSPADM

## 2019-08-12 RX ORDER — MELOXICAM 15 MG/1
7.5 TABLET ORAL DAILY
Status: DISCONTINUED | OUTPATIENT
Start: 2019-08-13 | End: 2019-08-15 | Stop reason: HOSPADM

## 2019-08-12 RX ORDER — TRANEXAMIC ACID 100 MG/ML
INJECTION, SOLUTION INTRAVENOUS AS NEEDED
Status: DISCONTINUED | OUTPATIENT
Start: 2019-08-12 | End: 2019-08-12 | Stop reason: SURG

## 2019-08-12 RX ORDER — ONDANSETRON 2 MG/ML
INJECTION INTRAMUSCULAR; INTRAVENOUS AS NEEDED
Status: DISCONTINUED | OUTPATIENT
Start: 2019-08-12 | End: 2019-08-12 | Stop reason: SURG

## 2019-08-12 RX ORDER — HYDROCODONE BITARTRATE AND ACETAMINOPHEN 7.5; 325 MG/1; MG/1
1 TABLET ORAL EVERY 4 HOURS PRN
Status: DISCONTINUED | OUTPATIENT
Start: 2019-08-12 | End: 2019-08-15 | Stop reason: HOSPADM

## 2019-08-12 RX ORDER — MELOXICAM 15 MG/1
15 TABLET ORAL
Status: COMPLETED | OUTPATIENT
Start: 2019-08-12 | End: 2019-08-12

## 2019-08-12 RX ADMIN — SODIUM CHLORIDE, POTASSIUM CHLORIDE, SODIUM LACTATE AND CALCIUM CHLORIDE: 600; 310; 30; 20 INJECTION, SOLUTION INTRAVENOUS at 14:23

## 2019-08-12 RX ADMIN — VANCOMYCIN HYDROCHLORIDE 1 G: 1 INJECTION, SOLUTION INTRAVENOUS at 11:44

## 2019-08-12 RX ADMIN — DEXAMETHASONE SODIUM PHOSPHATE 8 MG: 10 INJECTION INTRAMUSCULAR; INTRAVENOUS at 13:15

## 2019-08-12 RX ADMIN — LIDOCAINE HYDROCHLORIDE 100 MG: 20 INJECTION, SOLUTION INFILTRATION; PERINEURAL at 13:00

## 2019-08-12 RX ADMIN — ONDANSETRON 4 MG: 2 INJECTION INTRAMUSCULAR; INTRAVENOUS at 14:25

## 2019-08-12 RX ADMIN — GLYCOPYRROLATE 0.2 MG: 0.2 INJECTION INTRAMUSCULAR; INTRAVENOUS at 14:33

## 2019-08-12 RX ADMIN — MUPIROCIN 1 APPLICATION: 20 OINTMENT TOPICAL at 21:14

## 2019-08-12 RX ADMIN — NEOSTIGMINE METHYLSULFATE 3 MG: 1 INJECTION INTRAMUSCULAR; INTRAVENOUS; SUBCUTANEOUS at 14:30

## 2019-08-12 RX ADMIN — KETOROLAC TROMETHAMINE 15 MG: 30 INJECTION, SOLUTION INTRAMUSCULAR at 23:05

## 2019-08-12 RX ADMIN — TRANEXAMIC ACID 1000 MG: 100 INJECTION, SOLUTION INTRAVENOUS at 13:15

## 2019-08-12 RX ADMIN — FENTANYL CITRATE 50 MCG: 50 INJECTION INTRAMUSCULAR; INTRAVENOUS at 12:57

## 2019-08-12 RX ADMIN — PREGABALIN 150 MG: 75 CAPSULE ORAL at 11:55

## 2019-08-12 RX ADMIN — CEFAZOLIN SODIUM 2 G: 2 INJECTION, SOLUTION INTRAVENOUS at 21:10

## 2019-08-12 RX ADMIN — FENTANYL CITRATE 25 MCG: 50 INJECTION INTRAMUSCULAR; INTRAVENOUS at 13:37

## 2019-08-12 RX ADMIN — GLYCOPYRROLATE 0.6 MG: 0.2 INJECTION INTRAMUSCULAR; INTRAVENOUS at 14:30

## 2019-08-12 RX ADMIN — FENTANYL CITRATE 50 MCG: 50 INJECTION INTRAMUSCULAR; INTRAVENOUS at 15:53

## 2019-08-12 RX ADMIN — ROCURONIUM BROMIDE 40 MG: 10 INJECTION INTRAVENOUS at 13:00

## 2019-08-12 RX ADMIN — FAMOTIDINE 20 MG: 10 INJECTION INTRAVENOUS at 12:06

## 2019-08-12 RX ADMIN — PROPOFOL 120 MG: 10 INJECTION, EMULSION INTRAVENOUS at 13:00

## 2019-08-12 RX ADMIN — ASPIRIN 325 MG: 325 TABLET, COATED ORAL at 21:14

## 2019-08-12 RX ADMIN — CEFAZOLIN SODIUM 2 G: 2 INJECTION, SOLUTION INTRAVENOUS at 13:10

## 2019-08-12 RX ADMIN — POLYETHYLENE GLYCOL 3350 17 G: 17 POWDER, FOR SOLUTION ORAL at 21:14

## 2019-08-12 RX ADMIN — KETOROLAC TROMETHAMINE 15 MG: 30 INJECTION, SOLUTION INTRAMUSCULAR at 15:11

## 2019-08-12 RX ADMIN — SODIUM CHLORIDE, POTASSIUM CHLORIDE, SODIUM LACTATE AND CALCIUM CHLORIDE 9 ML/HR: 600; 310; 30; 20 INJECTION, SOLUTION INTRAVENOUS at 12:16

## 2019-08-12 RX ADMIN — ACETAMINOPHEN 1000 MG: 10 INJECTION, SOLUTION INTRAVENOUS at 13:20

## 2019-08-12 RX ADMIN — TRANEXAMIC ACID 1000 MG: 100 INJECTION, SOLUTION INTRAVENOUS at 14:27

## 2019-08-12 RX ADMIN — HYDRALAZINE HYDROCHLORIDE 5 MG: 20 INJECTION INTRAMUSCULAR; INTRAVENOUS at 15:35

## 2019-08-12 RX ADMIN — SODIUM CHLORIDE, POTASSIUM CHLORIDE, SODIUM LACTATE AND CALCIUM CHLORIDE 500 ML: 600; 310; 30; 20 INJECTION, SOLUTION INTRAVENOUS at 11:45

## 2019-08-12 RX ADMIN — MELOXICAM 15 MG: 15 TABLET ORAL at 11:55

## 2019-08-12 RX ADMIN — DOCUSATE SODIUM 100 MG: 100 CAPSULE, LIQUID FILLED ORAL at 21:14

## 2019-08-12 RX ADMIN — EPHEDRINE SULFATE 10 MG: 50 INJECTION INTRAMUSCULAR; INTRAVENOUS; SUBCUTANEOUS at 14:35

## 2019-08-12 NOTE — OP NOTE
Name: Erum Yang  YOB: 1933    DATE OF SURGERY: 8/12/2019    PREOPERATIVE DIAGNOSIS: Right hip end-stage osteoarthritis    POSTOPERATIVE DIAGNOSIS: Right hip end-stage osteoarthritis    PROCEDURE PERFORMED: Right  total hip replacement    SURGEON: David Archuleta M.D.    ASSISTANT: GRAZYNA HENSLEY    IMPLANTS:  Implant Name Type Inv. Item Serial No.  Lot No. LRB No. Used   SHLL ACET R3 3H STD 52MM - [id number] Implant SHLL ACET R3 3H STD 52MM  [employer] [id number] Right 1   LINER ACET R3 XLPE 20D [id number] - [id number] Implant LINER ACET R3 XLPE 20D [id number]  [employer] [id number] Right 1   SCRW SPH HD REFLECTION 6.5X30MM - [id number] Implant SCRW SPH HD REFLECTION 6.5X30MM  [employer] [id number] Right 1   SCRW SPH HD REFLECTION 6.5X25MM - [id number] Implant SCRW SPH HD REFLECTION 6.5X25MM  [employer] [id number] Right 1   STEM POLARSTEM CMTLESS STD CCD 135D SZ1 - [id number] Implant STEM POLARSTEM CMTLESS STD CCD 135D SZ1  [employer] [id number] Right 1   HD FEM OXINIUM TPR 12 14 36MM MINUS3 - [id number] Implant HD FEM OXINIUM TPR 12 14 36MM MINUS3  [employer] [id number] Right 1       Estimated Blood Loss: 200cc  Specimens : none  Complications: none    DESCRIPTION OF PROCEDURE:    The patient was taken to the operating room and placed in the supine position. A sequential compression device was carefully placed on the non-operative leg. Preoperative antibiotics were administered. Surgical time out was performed. After adequate induction of anesthesia the patient was then transferred onto the  table and positioned appropriately in the lateral decubitus position. The hip was then prepped and draped in the usual sterile fashion.    A posterior lateral surgical incision was then made.  The gluteus miller fascia was then divided the gluteus miller muscle was then bluntly dissected.  A Charnley self-retaining retractor was then placed.  A posterior capsulotomy was then  performed.  The superior limb was divided in line with the piriformis tendon.  The hip was then dislocated.  There were end-stage arthritic findings.  The femoral neck osteotomy was performed according to the level dictated by the template.  The acetabulum was exposed with standard retractors.  The labrum and pulvinar were then excised.  The cup was then medialized with the starting acetabular reamer to the medial wall.  I then progressively reamed up to the appropriate size and 45° of abduction and 20° of anteversion. Line to line reaming was performed. At this point the bone was nicely prepared there was excellent bleeding bone. We then impacted the acetabular component in 45 of abduction and 20 of anteversion the cup was stable.  Per routine we augmented the fixation with 2 screws in the posterior and superior quadrant  The final liner was then placed and it locked in nicely.  At this point we injected the hip with anesthetic cocktail solution.  We then turned our attention to the femur.   Standard retractors were placed to expose the femur.  The box osteotome was used to create a starting point.  The canal finder was then used to sound the canal.  The lateralizing reamer was then used to lateralize into the greater trochanter.  We progressively reamed and broached until the broach was very solid to axial and rotational stresses.  At this point we placed the trial neck and head hip was very stable to flexion and internal rotation as well as extension and external rotation.  The leg lengths were measured to be equal.  We then removed the trial components,copiously irrigated the hip, and then impacted the final stem.  At this point we then trialed and chose the final head. The head was placed on a clean dry taper.  The leg lengths were again measured to be equal.  At this point the hip was copiously irrigated with pulse lavage and the capsule was then reapproximated with #1 PDS suture, and the remainder of the hip  was closed in multiple layers in standard fashion..  There was excellent hemostasis. We placed a one-eighth inch Hemovac drain.  Sterile dressing were applied. At the end of the case, the sponge and needle counts were reported as being correct. There were no known complications. The patient was then transported to the recovery room.      David Archuleta M.D.  8/12/2019

## 2019-08-12 NOTE — PLAN OF CARE
Problem: Patient Care Overview  Goal: Plan of Care Review  Outcome: Ongoing (interventions implemented as appropriate)   08/12/19 0249   Coping/Psychosocial   Plan of Care Reviewed With patient;family   Plan of Care Review   Progress improving   OTHER   Outcome Summary RTH (posterior) today, arrived to floor 1655, A&O, 1L, LUIS ARMANDO c/d/i, DTV by 2230, WBAT, has not worked w/PT yet, rehab at d/c      Goal: Individualization and Mutuality  Outcome: Ongoing (interventions implemented as appropriate)      Problem: Pain, Chronic (Adult)  Goal: Identify Related Risk Factors and Signs and Symptoms  Outcome: Outcome(s) achieved Date Met: 08/12/19    Goal: Acceptable Pain/Comfort Level and Functional Ability  Outcome: Ongoing (interventions implemented as appropriate)      Problem: Fall Risk (Adult)  Goal: Identify Related Risk Factors and Signs and Symptoms  Outcome: Outcome(s) achieved Date Met: 08/12/19    Goal: Absence of Fall  Outcome: Ongoing (interventions implemented as appropriate)

## 2019-08-12 NOTE — ANESTHESIA POSTPROCEDURE EVALUATION
"Patient: Erum Yang    Procedure Summary     Date:  08/12/19 Room / Location:  [facility] OR [address] [place] [address] MAIN OR    Anesthesia Start:  1256 Anesthesia Stop:  1456    Procedure:  TOTAL HIP ARTHROPLASTY (Right Hip) Diagnosis:       Primary osteoarthritis of right hip      (Primary osteoarthritis of right hip [M16.11])    Surgeon:  David Archuleta MD Provider:  Savage Pisano MD    Anesthesia Type:  general ASA Status:  2          Anesthesia Type: general  Last vitals  BP   (!) 189/91 (08/12/19 1535)   Temp   36.6 °C (97.9 °F) (08/12/19 1449)   Pulse   62 (08/12/19 1535)   Resp   16 (08/12/19 1520)     SpO2   99 % (08/12/19 1520)     Post Anesthesia Care and Evaluation    Patient location during evaluation: PACU  Patient participation: complete - patient participated  Level of consciousness: awake  Pain management: adequate  Airway patency: patent  Anesthetic complications: No anesthetic complications    Cardiovascular status: acceptable  Respiratory status: acceptable  Hydration status: acceptable    Comments: BP (!) 189/91   Pulse 62   Temp 36.6 °C (97.9 °F) (Oral)   Resp 16   Ht 157.5 cm (62\")   Wt 76.3 kg (168 lb 3.2 oz)   SpO2 99%   BMI 30.76 kg/m²         "

## 2019-08-12 NOTE — ANESTHESIA PROCEDURE NOTES
Airway  Urgency: elective    Airway not difficult    General Information and Staff    Patient location during procedure: OR  Anesthesiologist: Savage Pisano MD  CRNA: Charity Mcnamara CRNA    Indications and Patient Condition  Indications for airway management: airway protection    Preoxygenated: yes (pt pre-O2 with 100% O2)  Mask difficulty assessment: 2 - vent by mask + OA or adjuvant +/- NMBA (easy BMV )    Final Airway Details  Final airway type: endotracheal airway      Successful airway: ETT  Cuffed: yes   Successful intubation technique: direct laryngoscopy  Endotracheal tube insertion site: oral  Blade: Stewart  Blade size: 3  ETT size (mm): 7.0  Cormack-Lehane Classification: grade I - full view of glottis  Placement verified by: chest auscultation and capnometry   Cuff volume (mL): 7  Measured from: lips  ETT to lips (cm): 20  Number of attempts at approach: 1    Additional Comments  ATOETx1. No change in dentition.

## 2019-08-12 NOTE — ANESTHESIA PREPROCEDURE EVALUATION
Anesthesia Evaluation     Patient summary reviewed and Nursing notes reviewed   NPO Solid Status: > 8 hours  NPO Liquid Status: > 2 hours           Airway   Mallampati: II  TM distance: >3 FB  Neck ROM: full  No difficulty expected  Dental - normal exam     Pulmonary - normal exam   (-) pleural effusion  Cardiovascular   Exercise tolerance: good (4-7 METS)    Rhythm: regular  Rate: normal    (-) valvular problems/murmurs, PVD      Neuro/Psych  GI/Hepatic/Renal/Endo      Musculoskeletal     (+) arthralgias,   Abdominal    Substance History      OB/GYN          Other   (+) arthritis                     Anesthesia Plan    ASA 2     general     intravenous induction   Anesthetic plan, all risks, benefits, and alternatives have been provided, discussed and informed consent has been obtained with: patient.    Plan discussed with CRNA.

## 2019-08-13 LAB
HCT VFR BLD AUTO: 36.9 % (ref 34–46.6)
HGB BLD-MCNC: 11.3 G/DL (ref 12–15.9)

## 2019-08-13 PROCEDURE — 85014 HEMATOCRIT: CPT | Performed by: NURSE PRACTITIONER

## 2019-08-13 PROCEDURE — 97110 THERAPEUTIC EXERCISES: CPT

## 2019-08-13 PROCEDURE — 25010000002 KETOROLAC TROMETHAMINE PER 15 MG: Performed by: NURSE PRACTITIONER

## 2019-08-13 PROCEDURE — 85018 HEMOGLOBIN: CPT | Performed by: NURSE PRACTITIONER

## 2019-08-13 PROCEDURE — 97162 PT EVAL MOD COMPLEX 30 MIN: CPT

## 2019-08-13 PROCEDURE — 25010000003 CEFAZOLIN IN DEXTROSE 2-4 GM/100ML-% SOLUTION: Performed by: NURSE PRACTITIONER

## 2019-08-13 RX ADMIN — ASPIRIN 325 MG: 325 TABLET, COATED ORAL at 08:54

## 2019-08-13 RX ADMIN — DOCUSATE SODIUM 100 MG: 100 CAPSULE, LIQUID FILLED ORAL at 08:54

## 2019-08-13 RX ADMIN — PANTOPRAZOLE SODIUM 40 MG: 40 TABLET, DELAYED RELEASE ORAL at 06:47

## 2019-08-13 RX ADMIN — DOCUSATE SODIUM 100 MG: 100 CAPSULE, LIQUID FILLED ORAL at 21:17

## 2019-08-13 RX ADMIN — MUPIROCIN 1 APPLICATION: 20 OINTMENT TOPICAL at 21:17

## 2019-08-13 RX ADMIN — MUPIROCIN 1 APPLICATION: 20 OINTMENT TOPICAL at 08:54

## 2019-08-13 RX ADMIN — KETOROLAC TROMETHAMINE 15 MG: 30 INJECTION, SOLUTION INTRAMUSCULAR at 16:42

## 2019-08-13 RX ADMIN — POLYETHYLENE GLYCOL 3350 17 G: 17 POWDER, FOR SOLUTION ORAL at 21:17

## 2019-08-13 RX ADMIN — MELOXICAM 7.5 MG: 15 TABLET ORAL at 08:54

## 2019-08-13 RX ADMIN — ASPIRIN 325 MG: 325 TABLET, COATED ORAL at 21:17

## 2019-08-13 RX ADMIN — POLYETHYLENE GLYCOL 3350 17 G: 17 POWDER, FOR SOLUTION ORAL at 08:54

## 2019-08-13 RX ADMIN — HYDROCODONE BITARTRATE AND ACETAMINOPHEN 1 TABLET: 7.5; 325 TABLET ORAL at 08:54

## 2019-08-13 RX ADMIN — CEFAZOLIN SODIUM 2 G: 2 INJECTION, SOLUTION INTRAVENOUS at 05:24

## 2019-08-13 RX ADMIN — KETOROLAC TROMETHAMINE 15 MG: 30 INJECTION, SOLUTION INTRAMUSCULAR at 06:47

## 2019-08-13 NOTE — DISCHARGE PLACEMENT REQUEST
"Jean Paul Gutierrez (85 y.o. Female)     Date of Birth Social Security Number Address Home Phone MRN    12/12/1933  [address]  [facility] [address] [phone] [id number]    Christianity Marital Status          Christianity Single       Admission Date Admission Type Admitting Provider Attending Provider Department, Room/Bed    8/12/19 Elective David Archuleta MD Brown, Reid B, MD [address], P796/1    Discharge Date Discharge Disposition Discharge Destination                       Attending Provider:  David Archuleta MD    Allergies:  No Known Allergies    Isolation:  None   Infection:  None   Code Status:  CPR    Ht:  157.5 cm (62\")   Wt:  76.3 kg (168 lb 3.2 oz)    Admission Cmt:  None   Principal Problem:  Primary osteoarthritis of right hip [M16.11] More...                 Active Insurance as of 8/12/2019     Primary Coverage     Payor Plan Insurance Group Employer/Plan Group    MEDICARE MEDICARE A & B      Payor Plan Address Payor Plan Phone Number Payor Plan Fax Number Effective Dates    [address] [phone]  12/1/1998 - None Entered    [facility] [id number]       Subscriber Name Subscriber Birth Date Member ID       JEAN PAUL GUTIERREZ 12/12/1933 [id number]           Secondary Coverage     Payor Plan Insurance Group Employer/Plan Group    [place] [id number]     Payor Plan Address Payor Plan Phone Number Payor Plan Fax Number Effective Dates    [address]   1/14/1990 - None Entered    [facility] [id number]       Subscriber Name Subscriber Birth Date Member ID       JEAN PAUL GUTIERREZ 12/12/1933 [id number]                 Emergency Contacts      (Rel.) Home Phone Work Phone Mobile Phone    GUTIERREZCORAZON (Sister) -- -- [phone]              "

## 2019-08-13 NOTE — PROGRESS NOTES
Discharge Planning Assessment  [facility]     Patient Name: Erum Yang  MRN: [id number]  Today's Date: 8/13/2019    Admit Date: 8/12/2019    Discharge Needs Assessment     Row Name 08/13/19 1625       Living Environment    Lives With  sibling(s)    Current Living Arrangements  home/apartment/condo    Family Caregiver if Needed  sibling(s)    Quality of Family Relationships  involved       Resource/Environmental Concerns    Resource/Environmental Concerns  none       Transition Planning    Patient/Family Anticipates Transition to  inpatient rehabilitation facility    Patient/Family Anticipated Services at Transition  rehabilitation services;skilled nursing    Transportation Anticipated  family or friend will provide       Discharge Needs Assessment    Readmission Within the Last 30 Days  no previous admission in last 30 days    Concerns to be Addressed  adjustment to diagnosis/illness    Equipment Currently Used at Home  cane, straight;walker, rolling;wheelchair;commode    Discharge Facility/Level of Care Needs  rehabilitation facility;nursing facility, skilled        Discharge Plan     Row Name 08/13/19 1627       Plan    Plan  [facility]     Patient/Family in Agreement with Plan  yes    Plan Comments  Met w/ pt verified facesheet and dc plan. Pt lives w/ her sister, she would like short-term rehab before returning home. Prior to surgery she registered w/ [facility]; Christie notified to [facility].         Destination      Service Provider Request Status Selected Services Address Phone Number Fax Number    [facility] [place] Pending - Request Sent N/A [address] [facility] [id number] [phone]      Durable Medical Equipment      No service coordination in this encounter.      Dialysis/Infusion      No service coordination in this encounter.      Home Medical Care      No service coordination in this encounter.      Therapy      No service coordination in this encounter.       Community Resources      No service coordination in this encounter.          Demographic Summary    No documentation.       Functional Status     Row Name 08/13/19 1626       Functional Status    Usual Activity Tolerance  moderate    Current Activity Tolerance  fair       Functional Status, IADL    Medications  independent    Meal Preparation  independent    Housekeeping  assistive equipment    Laundry  assistive person    Shopping  assistive equipment and person        Psychosocial    No documentation.       Abuse/Neglect    No documentation.       Legal    No documentation.       Substance Abuse    No documentation.       Patient Forms     Row Name 08/13/19 1626       Patient Forms    Provider Choice List  Delivered    Delivered to  Patient    Method of delivery  In person            Minerva Perez RN

## 2019-08-13 NOTE — PLAN OF CARE
Problem: Patient Care Overview  Goal: Plan of Care Review  Outcome: Ongoing (interventions implemented as appropriate)   08/13/19 0902   Coping/Psychosocial   Plan of Care Reviewed With patient   OTHER   Outcome Summary Pt presents s/p R CY and hip joint mobility deficits and difficulty walking. This visit she walked in room 15'modA and had moderate complaints of pain. PLOF is uses w/c for community mobility and walker for household distances, has 2 stairs to enter and 13 stairs to get into her garage, lives with sister is unable to assist with caregiving as she has vision deficits. Plan is d/c to subacute rehab.

## 2019-08-13 NOTE — PLAN OF CARE
Problem: Patient Care Overview  Goal: Plan of Care Review  Outcome: Ongoing (interventions implemented as appropriate)   08/13/19 0138   Coping/Psychosocial   Plan of Care Reviewed With patient   Plan of Care Review   Progress improving   OTHER   Outcome Summary vss,tania dsg c/d/i, neurovacular status wnl, voiding well, reports adequate pain control, ambulated in room , discharge plan is to rehab, will continue to monitor,     Goal: Individualization and Mutuality  Outcome: Ongoing (interventions implemented as appropriate)    Goal: Discharge Needs Assessment  Outcome: Ongoing (interventions implemented as appropriate)    Goal: Interprofessional Rounds/Family Conf  Outcome: Ongoing (interventions implemented as appropriate)      Problem: Pain, Chronic (Adult)  Goal: Acceptable Pain/Comfort Level and Functional Ability  Outcome: Ongoing (interventions implemented as appropriate)      Problem: Fall Risk (Adult)  Goal: Absence of Fall  Outcome: Ongoing (interventions implemented as appropriate)      Problem: Hip Arthroplasty (Total, Partial) (Adult)  Goal: Signs and Symptoms of Listed Potential Problems Will be Absent, Minimized or Managed (Hip Arthroplasty)  Outcome: Ongoing (interventions implemented as appropriate)    Goal: Anesthesia/Sedation Recovery  Outcome: Ongoing (interventions implemented as appropriate)
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
## 2019-08-13 NOTE — THERAPY TREATMENT NOTE
Acute Care - Physical Therapy Treatment Note  [facility]     Patient Name: Erum Yang  : 1933  MRN: [id number]  Today's Date: 2019             Admit Date: 2019    Visit Dx:    ICD-10-CM ICD-9-CM   1. Primary osteoarthritis of right hip M16.11 715.15     Patient Active Problem List   Diagnosis   • Osteoarthritis of multiple joints   • Hx of hepatitis   • Venous insufficiency of right leg   • Primary osteoarthritis of right hip       Therapy Treatment    Rehabilitation Treatment Summary     Row Name 19 1623             Treatment Time/Intention    Discipline  physical therapy assistant  -      Document Type  therapy note (daily note)  -      Subjective Information  complains of;fatigue;pain  -      Mode of Treatment  physical therapy  -2      Care Plan Review  patient/other agree to care plan  -      Comment  c/o feeling weaker and more painful this pm  -JM2      Existing Precautions/Restrictions  fall;hip, posterior;right  -2      Treatment Considerations/Comments  hip prec educ for now will check  to see if MD documents NO PREC  -2      Recorded by [] Charity Hinojosa, [facility] 19 1623  [2] Charity Hinojosa, [facility] 19 1657      Row Name 19 1623             Sit-Stand Transfer    Sit-Stand [place] (Transfers)  moderate assist (50% patient effort);verbal cues;nonverbal cues (demo/gesture) req 2 attempts  -      Assistive Device (Sit-Stand Transfers)  walker, front-wheeled  -      Recorded by [] Charity Hinojosa, [facility] 19 1657      Row Name 19 1623             Gait/Stairs Assessment/Training    [place] Level (Gait)  moderate assist (50% patient effort);verbal cues;nonverbal cues (demo/gesture)  -      Assistive Device (Gait)  walker, front-wheeled  -      Distance in Feet (Gait)  10  -      Deviations/Abnormal Patterns (Gait)  antalgic;ara decreased;stride length decreased  -      Bilateral Gait Deviations  forward flexed  posture unsafe level of forw flex, grabs 2nd rail of wx not handgrip  -JM      Comment (Gait/Stairs)  cues for each step, followed closely w/recliner  -JM      Recorded by [] Charity Hinojosa PTA 08/13/19 1657      Row Name 08/13/19 1623             Therapeutic Exercise    Comment (Therapeutic Exercise)  THR protocol x15 reps w/assist for hip abd/add too pnful to attempt HS  -JM      Recorded by [] Charity Hinojosa PTA 08/13/19 1657      Row Name 08/13/19 1623             Positioning and Restraints    Pre-Treatment Position  sitting in chair/recliner  -JM      In Chair  reclined;call light within reach;encouraged to call for assist;with family/caregiver;notified nsg no alarm upon entry  -JM      Recorded by [] Charity Hinojosa PTA 08/13/19 1657      Row Name 08/13/19 1623             Pain Scale: Numbers Pre/Post-Treatment    Pain Scale: Numbers, Pretreatment  8/10  -JM      Pain Location - Side  Right  -JM      Pain Location  hip  -JM      Pain Intervention(s)  Medication (See MAR);Repositioned  -JM      Recorded by [] Charity Hinojosa PTA 08/13/19 1657      Row Name                Wound 08/12/19 1421 Right hip Incision    Wound - Properties Group Date first assessed: 08/12/19 [VB] Time first assessed: 1421 [VB] Side: Right [VB] Location: hip [VB] Primary Wound Type: Incision [VB] Recorded by:  [VB] Wen Dotson RN 08/12/19 1421      User Key  (r) = Recorded By, (t) = Taken By, (c) = Cosigned By    Initials Name Effective Dates Discipline     Charity Hinojosa, FRANKIE 03/07/18 -  PT    VB Wen Dotson RN 06/16/16 -  Nurse          Wound 08/12/19 1421 Right hip Incision (Active)   Dressing Appearance dry;intact;no drainage 8/13/2019  8:54 AM   Closure JASON 8/13/2019  8:54 AM   Base dressing in place, unable to visualize 8/13/2019  8:54 AM   Drainage Amount none 8/13/2019  8:54 AM   Dressing Care, Wound other (see comments) 8/13/2019  8:54 AM       Rehab Goal Summary     Row Name 08/13/19 0900              Bed Mobility Goal 1 (PT)    Activity/Assistive Device (Bed Mobility Goal 1, PT)  sit to supine;supine to sit  -CS      [place] Level/Cues Needed (Bed Mobility Goal 1, PT)  contact guard assist  -CS      Time Frame (Bed Mobility Goal 1, PT)  1 week  -CS         Transfer Goal 1 (PT)    Activity/Assistive Device (Transfer Goal 1, PT)  sit-to-stand/stand-to-sit;bed-to-chair/chair-to-bed  -CS      [place] Level/Cues Needed (Transfer Goal 1, PT)  contact guard assist  -CS      Time Frame (Transfer Goal 1, PT)  1 week  -CS         Gait Training Goal 1 (PT)    [place] Level (Gait Training Goal 1, PT)  minimum assist (75% or more patient effort)  -CS      Distance (Gait Goal 1, PT)  100  -CS      Time Frame (Gait Training Goal 1, PT)  1 week  -CS        User Key  (r) = Recorded By, (t) = Taken By, (c) = Cosigned By    Initials Name Provider Type Discipline     Giovany Blackmon, PT Physical Therapist PT          Physical Therapy Education     Title: PT OT SLP Therapies (Done)     Topic: Physical Therapy (Done)     Point: Mobility training (Done)     Learning Progress Summary           Patient Acceptance, E,TB, VU,NR by  at 8/13/2019  9:02 AM                   Point: Home exercise program (Done)     Learning Progress Summary           Patient Acceptance, E,TB, VU,NR by  at 8/13/2019  9:02 AM                   Point: Body mechanics (Done)     Learning Progress Summary           Patient Acceptance, E,TB, VU,NR by  at 8/13/2019  9:02 AM                   Point: Precautions (Done)     Learning Progress Summary           Patient Acceptance, E,TB, VU,NR by  at 8/13/2019  9:02 AM                               User Key     Initials Effective Dates Name Provider Type Discipline     05/14/18 -  Giovany Blackmon, PT Physical Therapist PT                PT Recommendation and Plan           Time Calculation:   PT Charges     Row Name 08/13/19 1657 08/13/19 1316 08/13/19 0906       Time Calculation    Start  Time  1600  -  --  0830  -    Stop Time  1624  -  --  0855  -    Time Calculation (min)  24 min  -  --  25 min  -    PT Received On  08/13/19  -  --  08/13/19  -    PT - Next Appointment  08/14/19  -  08/13/19  -  08/14/19  -    PT Goal Re-Cert Due Date  --  --  08/20/19  -       Time Calculation- PT    Total Timed Code Minutes- PT  24 minute(s)  -  --  --      User Key  (r) = Recorded By, (t) = Taken By, (c) = Cosigned By    Initials Name Provider Type     Tania Cox, PT Physical Therapist    Charity Melgar PTA Physical Therapy Assistant    Giovany Mazariegos, PT Physical Therapist        Therapy Charges for Today     Code Description Service Date Service Provider Modifiers Qty    29050369592 HC PT THER PROC EA 15 MIN 8/13/2019 Charity Hinojosa PTA GP 2          PT G-Codes  Outcome Measure Options: AM-PAC 6 Clicks Basic Mobility (PT)  AM-PAC 6 Clicks Score (PT): 14    Charity Hinojosa PTA  8/13/2019

## 2019-08-14 LAB
HCT VFR BLD AUTO: 29.9 % (ref 34–46.6)
HGB BLD-MCNC: 9.3 G/DL (ref 12–15.9)

## 2019-08-14 PROCEDURE — 85018 HEMOGLOBIN: CPT | Performed by: NURSE PRACTITIONER

## 2019-08-14 PROCEDURE — 97150 GROUP THERAPEUTIC PROCEDURES: CPT

## 2019-08-14 PROCEDURE — 97110 THERAPEUTIC EXERCISES: CPT

## 2019-08-14 PROCEDURE — 85014 HEMATOCRIT: CPT | Performed by: NURSE PRACTITIONER

## 2019-08-14 RX ADMIN — HYDROCODONE BITARTRATE AND ACETAMINOPHEN 1 TABLET: 7.5; 325 TABLET ORAL at 09:29

## 2019-08-14 RX ADMIN — POLYETHYLENE GLYCOL 3350 17 G: 17 POWDER, FOR SOLUTION ORAL at 20:40

## 2019-08-14 RX ADMIN — DOCUSATE SODIUM 100 MG: 100 CAPSULE, LIQUID FILLED ORAL at 09:28

## 2019-08-14 RX ADMIN — HYDROCODONE BITARTRATE AND ACETAMINOPHEN 1 TABLET: 7.5; 325 TABLET ORAL at 20:40

## 2019-08-14 RX ADMIN — DOCUSATE SODIUM 100 MG: 100 CAPSULE, LIQUID FILLED ORAL at 20:40

## 2019-08-14 RX ADMIN — HYDROCODONE BITARTRATE AND ACETAMINOPHEN 2 TABLET: 7.5; 325 TABLET ORAL at 14:21

## 2019-08-14 RX ADMIN — POLYETHYLENE GLYCOL 3350 17 G: 17 POWDER, FOR SOLUTION ORAL at 09:29

## 2019-08-14 RX ADMIN — MELOXICAM 7.5 MG: 15 TABLET ORAL at 09:29

## 2019-08-14 RX ADMIN — MUPIROCIN: 20 OINTMENT TOPICAL at 09:29

## 2019-08-14 RX ADMIN — PANTOPRAZOLE SODIUM 40 MG: 40 TABLET, DELAYED RELEASE ORAL at 06:12

## 2019-08-14 RX ADMIN — ASPIRIN 325 MG: 325 TABLET, COATED ORAL at 20:40

## 2019-08-14 RX ADMIN — ASPIRIN 325 MG: 325 TABLET, COATED ORAL at 09:29

## 2019-08-14 RX ADMIN — HYDROCODONE BITARTRATE AND ACETAMINOPHEN 1 TABLET: 7.5; 325 TABLET ORAL at 00:08

## 2019-08-14 RX ADMIN — HYDROCODONE BITARTRATE AND ACETAMINOPHEN 1 TABLET: 7.5; 325 TABLET ORAL at 10:25

## 2019-08-14 NOTE — THERAPY TREATMENT NOTE
Acute Care - Physical Therapy Treatment Note  [facility]     Patient Name: Erum Yang  : 1933  MRN: [id number]  Today's Date: 2019             Admit Date: 2019    Visit Dx:    ICD-10-CM ICD-9-CM   1. Primary osteoarthritis of right hip M16.11 715.15     Patient Active Problem List   Diagnosis   • Osteoarthritis of multiple joints   • Hx of hepatitis   • Venous insufficiency of right leg   • Primary osteoarthritis of right hip       Therapy Treatment    Rehabilitation Treatment Summary     Row Name 19 1500             Treatment Time/Intention    Discipline  physical therapy assistant  -      Document Type  therapy note (daily note)  -      Subjective Information  complains of;weakness;fatigue;pain  -      Mode of Treatment  physical therapy;group therapy  -      Existing Precautions/Restrictions  fall;hip, posterior;right  -      Recorded by [] Charity Hinojosa PTA 19      Row Name 19 1500             Sit-Stand Transfer    Sit-Stand [place] (Transfers)  moderate assist (50% patient effort);verbal cues;nonverbal cues (demo/gesture) req 2 attempts  -      Assistive Device (Sit-Stand Transfers)  walker, front-wheeled  -      Recorded by [] Charity Hinojosa PTA 19      Row Name 19 1500             Gait/Stairs Assessment/Training    [place] Level (Gait)  moderate assist (50% patient effort);verbal cues;nonverbal cues (demo/gesture)  -      Assistive Device (Gait)  walker, front-wheeled  -      Distance in Feet (Gait)  8  -      Deviations/Abnormal Patterns (Gait)  antalgic;ara decreased;stride length decreased  -      Bilateral Gait Deviations  forward flexed posture  -      Recorded by [JM] Charity Hinojosa PTA 19      Row Name 19 1500             Therapeutic Exercise    Comment (Therapeutic Exercise)  THR protocol x 25 reps  -      Recorded by [] Charity Hinojosa PTA 19      Ann Marie  Name 08/14/19 1500             Positioning and Restraints    Pre-Treatment Position  sitting in chair/recliner  -JM      In Chair  reclined;call light within reach;encouraged to call for assist;exit alarm on;with family/caregiver  -JM      Recorded by [LEON] Charity Hinojosa PTA 08/14/19 1832      Row Name 08/14/19 1500             Pain Scale: Numbers Pre/Post-Treatment    Pain Scale: Numbers, Pretreatment  3/10  -JM      Pain Location - Side  Right  -JM      Pain Location  hip  -JM      Recorded by [LEON] Chraity Hinojosa PTA 08/14/19 1832      Row Name                Wound 08/12/19 1421 Right hip Incision    Wound - Properties Group Date first assessed: 08/12/19 [VB] Time first assessed: 1421 [VB] Side: Right [VB] Location: hip [VB] Primary Wound Type: Incision [VB] Recorded by:  [JOHNSON] Wen Dotson RN 08/12/19 1421      User Key  (r) = Recorded By, (t) = Taken By, (c) = Cosigned By    Initials Name Effective Dates Discipline     Charity Hinojosa PTA 03/07/18 -  PT    Wen Fernández RN 06/16/16 -  Nurse          Wound 08/12/19 1421 Right hip Incision (Active)   Dressing Appearance dry;intact;no drainage 8/14/2019  9:28 AM   Closure JASON 8/14/2019  9:28 AM   Base dressing in place, unable to visualize 8/14/2019  9:28 AM   Drainage Amount none 8/14/2019  9:28 AM   Dressing Care, Wound other (see comments) 8/14/2019  9:28 AM           Physical Therapy Education     Title: PT OT SLP Therapies (Done)     Topic: Physical Therapy (Done)     Point: Mobility training (Done)     Learning Progress Summary           Patient Acceptance, E,TB,D, VU,NR by MICAH at 8/14/2019  1:15 PM    Acceptance, E,TB, VU,NR by CS at 8/13/2019  9:02 AM                   Point: Home exercise program (Done)     Learning Progress Summary           Patient Acceptance, E,TB,D, VU,NR by MICAH at 8/14/2019  1:15 PM    Acceptance, E,TB, VU,NR by CS at 8/13/2019  9:02 AM                   Point: Body mechanics (Done)     Learning Progress Summary            Patient Acceptance, E,TB,D, VU,NR by  at 8/14/2019  1:15 PM    Acceptance, E,TB, VU,NR by  at 8/13/2019  9:02 AM                   Point: Precautions (Done)     Learning Progress Summary           Patient Acceptance, E,TB,D, VU,NR by  at 8/14/2019  1:15 PM    Acceptance, E,TB, VU,NR by  at 8/13/2019  9:02 AM                               User Key     Initials Effective Dates Name Provider Type Discipline     05/14/18 -  Giovany Blackmon, PT Physical Therapist PT     07/05/19 -  Luda Mancini, PT Student PT Student PT                PT Recommendation and Plan           Time Calculation:   PT Charges     Row Name 08/14/19 1832 08/14/19 1317          Time Calculation    Start Time  1500  -JM  1028  -CH (r) BK (t) CH (c)     Stop Time  1540  -JM  1051  -CH (r) BK (t) CH (c)     Time Calculation (min)  40 min  -JM  23 min  -CH (r) BK (t)     PT Received On  08/14/19  -  08/14/19  -CH (r) BK (t) CH (c)     PT - Next Appointment  08/15/19  -  08/14/19  -CH (r) BK (t) CH (c)        Time Calculation- PT    Total Timed Code Minutes- PT  23 minute(s)  -JM  13 minute(s)  -CH (r) BK (t) CH (c)       User Key  (r) = Recorded By, (t) = Taken By, (c) = Cosigned By    Initials Name Provider Type     Tania Cox, PT Physical Therapist    Charity Melgar, PTA Physical Therapy Assistant    Luda Martinez, PT Student PT Student        Therapy Charges for Today     Code Description Service Date Service Provider Modifiers Qty    03139952699 HC PT THER PROC EA 15 MIN 8/13/2019 Charity Hinojosa PTA GP 2    33035687174 HC PT THER PROC EA 15 MIN 8/14/2019 Charity Hinojosa PTA GP 2    71178729516 HC PT THER PROC GROUP 8/14/2019 Charity Hinojosa PTA GP 1          PT G-Codes  Outcome Measure Options: AM-PAC 6 Clicks Basic Mobility (PT)  AM-PAC 6 Clicks Score (PT): 14    Charity Hinojosa, PTA  8/14/2019

## 2019-08-14 NOTE — PROGRESS NOTES
Continued Stay Note  [facility]     Patient Name: Erum Yang  MRN: [id number]  Today's Date: 8/14/2019    Admit Date: 8/12/2019    Discharge Plan     Row Name 08/14/19 1532       Plan    Plan  [facility] bed available 8/15    Patient/Family in Agreement with Plan  yes    Plan Comments  per [facility] bed available at [facility] on 8/15/19....GUERRERO Rivera        Discharge Codes    No documentation.             Joanie Chan

## 2019-08-14 NOTE — PLAN OF CARE
Problem: Patient Care Overview  Goal: Plan of Care Review  Outcome: Ongoing (interventions implemented as appropriate)   08/14/19 0447   Coping/Psychosocial   Plan of Care Reviewed With patient   Plan of Care Review   Progress improving   OTHER   Outcome Summary Pt has done well through the night. Ambulates with walker use and 1 assist. Working with PT post right total hip replacement. LUIS ARMANDO dressing is CDI. Minimal output this shift, will check PVR. No comorbidities to educate on. Plans to d/c to rehab on thursday. Will continue to monitor.      Goal: Individualization and Mutuality  Outcome: Ongoing (interventions implemented as appropriate)    Goal: Discharge Needs Assessment  Outcome: Ongoing (interventions implemented as appropriate)   08/13/19 1625   Discharge Needs Assessment   Readmission Within the Last 30 Days no previous admission in last 30 days   Concerns to be Addressed adjustment to diagnosis/illness   Patient/Family Anticipates Transition to inpatient rehabilitation facility   Patient/Family Anticipated Services at Transition rehabilitation services;skilled nursing   Transportation Anticipated family or friend will provide   Discharge Facility/Level of Care Needs rehabilitation facility;nursing facility, skilled   Disability   Equipment Currently Used at Home cane, straight;walker, rolling;wheelchair;commode     Goal: Interprofessional Rounds/Family Conf  Outcome: Ongoing (interventions implemented as appropriate)   08/14/19 0447   Interdisciplinary Rounds/Family Conf   Participants nursing;patient;family       Problem: Fall Risk (Adult)  Goal: Absence of Fall  Outcome: Ongoing (interventions implemented as appropriate)   08/14/19 0447   Fall Risk (Adult)   Absence of Fall achieves outcome       Problem: Hip Arthroplasty (Total, Partial) (Adult)  Goal: Signs and Symptoms of Listed Potential Problems Will be Absent, Minimized or Managed (Hip Arthroplasty)  Outcome: Ongoing (interventions implemented as  appropriate)   08/14/19 0447   Goal/Outcome Evaluation   Problems Assessed (Hip Arthroplasty) all   Problems Present (Hip Arthroplasty) pain;situational response     Goal: Anesthesia/Sedation Recovery  Outcome: Outcome(s) achieved Date Met: 08/14/19 08/14/19 0447   Goal/Outcome Evaluation   Anesthesia/Sedation Recovery recovered to baseline

## 2019-08-14 NOTE — PLAN OF CARE
Problem: Patient Care Overview  Goal: Plan of Care Review  Outcome: Ongoing (interventions implemented as appropriate)   08/14/19 3265   Coping/Psychosocial   Plan of Care Reviewed With patient   OTHER   Outcome Summary Pt continues to demonstrate difficulty walking secondary to pain and complaints of SOB. Pt requiring extensive cueing for posture, sequencing, and to use arms on AD for gait. PT to follow up to continue to address gait, strength, and functional mobility.
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
## 2019-08-15 ENCOUNTER — APPOINTMENT (OUTPATIENT)
Dept: GENERAL RADIOLOGY | Facility: HOSPITAL | Age: 84
End: 2019-08-15

## 2019-08-15 VITALS
TEMPERATURE: 97.3 F | BODY MASS INDEX: 30.95 KG/M2 | HEIGHT: 62 IN | RESPIRATION RATE: 18 BRPM | HEART RATE: 75 BPM | OXYGEN SATURATION: 94 % | SYSTOLIC BLOOD PRESSURE: 119 MMHG | WEIGHT: 168.2 LBS | DIASTOLIC BLOOD PRESSURE: 70 MMHG

## 2019-08-15 LAB
HCT VFR BLD AUTO: 33.3 % (ref 34–46.6)
HGB BLD-MCNC: 10.1 G/DL (ref 12–15.9)

## 2019-08-15 PROCEDURE — 73502 X-RAY EXAM HIP UNI 2-3 VIEWS: CPT

## 2019-08-15 PROCEDURE — 94640 AIRWAY INHALATION TREATMENT: CPT

## 2019-08-15 PROCEDURE — 85018 HEMOGLOBIN: CPT | Performed by: NURSE PRACTITIONER

## 2019-08-15 PROCEDURE — 94799 UNLISTED PULMONARY SVC/PX: CPT

## 2019-08-15 PROCEDURE — 71046 X-RAY EXAM CHEST 2 VIEWS: CPT

## 2019-08-15 PROCEDURE — 85014 HEMATOCRIT: CPT | Performed by: NURSE PRACTITIONER

## 2019-08-15 PROCEDURE — 97110 THERAPEUTIC EXERCISES: CPT

## 2019-08-15 PROCEDURE — 97150 GROUP THERAPEUTIC PROCEDURES: CPT

## 2019-08-15 PROCEDURE — 99024 POSTOP FOLLOW-UP VISIT: CPT | Performed by: ORTHOPAEDIC SURGERY

## 2019-08-15 RX ORDER — HYDROCODONE BITARTRATE AND ACETAMINOPHEN 7.5; 325 MG/1; MG/1
TABLET ORAL
Qty: 84 TABLET | Refills: 0
Start: 2019-08-15 | End: 2019-12-20

## 2019-08-15 RX ORDER — ALBUTEROL SULFATE 1.25 MG/3ML
1.25 SOLUTION RESPIRATORY (INHALATION) EVERY 6 HOURS PRN
Status: DISCONTINUED | OUTPATIENT
Start: 2019-08-15 | End: 2019-08-15

## 2019-08-15 RX ORDER — PANTOPRAZOLE SODIUM 40 MG/1
40 TABLET, DELAYED RELEASE ORAL EVERY MORNING
Start: 2019-08-16 | End: 2019-09-05 | Stop reason: SINTOL

## 2019-08-15 RX ORDER — BISACODYL 10 MG
10 SUPPOSITORY, RECTAL RECTAL ONCE
Status: COMPLETED | OUTPATIENT
Start: 2019-08-15 | End: 2019-08-15

## 2019-08-15 RX ORDER — PSEUDOEPHEDRINE HCL 30 MG
100 TABLET ORAL 2 TIMES DAILY
Start: 2019-08-15 | End: 2019-08-26

## 2019-08-15 RX ORDER — IPRATROPIUM BROMIDE AND ALBUTEROL SULFATE 2.5; .5 MG/3ML; MG/3ML
3 SOLUTION RESPIRATORY (INHALATION)
Status: DISCONTINUED | OUTPATIENT
Start: 2019-08-15 | End: 2019-08-15

## 2019-08-15 RX ORDER — ACETAMINOPHEN 325 MG/1
325 TABLET ORAL EVERY 4 HOURS PRN
Start: 2019-08-15 | End: 2019-08-26

## 2019-08-15 RX ORDER — IPRATROPIUM BROMIDE AND ALBUTEROL SULFATE 2.5; .5 MG/3ML; MG/3ML
3 SOLUTION RESPIRATORY (INHALATION) EVERY 4 HOURS PRN
Status: DISCONTINUED | OUTPATIENT
Start: 2019-08-15 | End: 2019-08-15 | Stop reason: HOSPADM

## 2019-08-15 RX ORDER — MELOXICAM 7.5 MG/1
7.5 TABLET ORAL DAILY
Qty: 27 TABLET | Refills: 0
Start: 2019-08-16 | End: 2019-09-12

## 2019-08-15 RX ADMIN — IPRATROPIUM BROMIDE AND ALBUTEROL SULFATE 3 ML: 2.5; .5 SOLUTION RESPIRATORY (INHALATION) at 09:25

## 2019-08-15 RX ADMIN — DOCUSATE SODIUM 100 MG: 100 CAPSULE, LIQUID FILLED ORAL at 08:28

## 2019-08-15 RX ADMIN — PANTOPRAZOLE SODIUM 40 MG: 40 TABLET, DELAYED RELEASE ORAL at 06:02

## 2019-08-15 RX ADMIN — POLYETHYLENE GLYCOL 3350 17 G: 17 POWDER, FOR SOLUTION ORAL at 08:28

## 2019-08-15 RX ADMIN — HYDROCODONE BITARTRATE AND ACETAMINOPHEN 2 TABLET: 7.5; 325 TABLET ORAL at 16:40

## 2019-08-15 RX ADMIN — HYDROCODONE BITARTRATE AND ACETAMINOPHEN 2 TABLET: 7.5; 325 TABLET ORAL at 10:00

## 2019-08-15 RX ADMIN — HYDROCODONE BITARTRATE AND ACETAMINOPHEN 1 TABLET: 7.5; 325 TABLET ORAL at 03:09

## 2019-08-15 RX ADMIN — ASPIRIN 325 MG: 325 TABLET, COATED ORAL at 08:28

## 2019-08-15 RX ADMIN — BISACODYL 10 MG: 10 SUPPOSITORY RECTAL at 14:00

## 2019-08-15 RX ADMIN — MELOXICAM 7.5 MG: 15 TABLET ORAL at 08:28
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
## 2019-08-15 NOTE — DISCHARGE SUMMARY
Patient Name: Erum Yang  Patient YOB: 1933    Date of Admission:  8/12/2019  Date of Discharge:  8/15/2019  Discharge Diagnosis: TOTAL HIP ARTHROPLASTY  Presenting Problem/History of Present Illness: Primary osteoarthritis of right hip [M16.11]  Primary osteoarthritis of right hip [M16.11]  Admitting Physician: Dr David Archuleta  Consults:   Consults     No orders found from 7/14/2019 to 8/13/2019.          DETAILS OF HOSPITAL STAY:  Patient is a 85 y.o. female was admitted to the floor following the above procedure and underwent an uncomplicated hospital stay.  Patient did well with physical therapy and was ambulating well without problems.  On the day of discharge the wound was clean, dry and intact and calf was soft and non tender and Homans sign was negative.  Patient was tolerating  without problems.  Patient will be discharged home.    Condition on Discharge:  Stable    Vital Signs  Temp:  [97.2 °F (36.2 °C)-98.8 °F (37.1 °C)] 98.8 °F (37.1 °C)  Heart Rate:  [64-81] 66  Resp:  [16-18] 16  BP: (112-131)/(63-69) 122/63    LABS:   Admission on 08/12/2019   Component Date Value Ref Range Status   • ABO Type 08/12/2019 O   Final   • RH type 08/12/2019 Positive   Final   • Antibody Screen 08/12/2019 Negative   Final   • T&S Expiration Date 08/12/2019 8/15/2019 11:59:59 PM   Final   • Hemoglobin 08/13/2019 11.3* 12.0 - 15.9 g/dL Final   • Hematocrit 08/13/2019 36.9  34.0 - 46.6 % Final   • Hemoglobin 08/14/2019 9.3* 12.0 - 15.9 g/dL Final   • Hematocrit 08/14/2019 29.9* 34.0 - 46.6 % Final   • Hemoglobin 08/15/2019 10.1* 12.0 - 15.9 g/dL Final   • Hematocrit 08/15/2019 33.3* 34.0 - 46.6 % Final       No results found.        Discharge Medications     Discharge Medications      New Medications      Instructions Start Date   acetaminophen 325 MG tablet  Commonly known as:  TYLENOL   325 mg, Oral, Every 4 Hours PRN      aspirin 325 MG EC tablet   325 mg, Oral, 2 Times Daily      docusate sodium 100 MG  capsule   100 mg, Oral, 2 Times Daily      HYDROcodone-acetaminophen 7.5-325 MG per tablet  Commonly known as:  NORCO   Take 1-2 tabs po q 4 hours prn pain      meloxicam 7.5 MG tablet  Commonly known as:  MOBIC   7.5 mg, Oral, Daily   Start Date:  8/16/2019     pantoprazole 40 MG EC tablet  Commonly known as:  PROTONIX   40 mg, Oral, Every Morning   Start Date:  8/16/2019     polyethylene glycol pack packet  Commonly known as:  MIRALAX   17 g, Oral, 2 Times Daily         Stop These Medications    Chlorhexidine Gluconate Cloth 2 % pads     mupirocin 2 % nasal ointment  Commonly known as:  BACTROBAN            Activity at Discharge:   Activity Instructions     Discharge Activity      PT - Ambulation, gait training, transfers WBAT with walker.   Muscle strengthening exercises to BUE and BLE.    No hip flexion greater than 90 degrees.          Discharge Instructions:   1)  Patient is to continue with physical therapy exercises daily and continue working with the physical therapist as ordered.  2)  Follow Posterior hip precautions.   3)  Patient may weight bear as tolerated.   4)  Apply ice regularly. You may ice for long periods of time as long as ice is not directly on the skin. Patient instructed on frequent calf pumping exercises.  Patient also instructed on incentive spirometer during hospitalization and encouraged to continue to use at home regularly.   5)  The dressing should be left in place. If waterproof dressing is intact the patient may shower immediately following discharge. If the dressing becomes disloged or saturated it should be changed. Please refer to the LUIS ARMANDO information sheet if you have any questions about the dressing.  If you have a home health nurse or therapist they can be contacted to assist with dressing change or repair. You may also call the LUIS ARMANDO dressing hotline for questions related to the dressing ([phone]). If there still other problems or questions related to the dressing  despite these measures then you can contact Janette at our office [phone].   6)  Follow up appointment in 2 weeks - patient to call the office at [phone] to schedule. 7)  Patient will be discharged on aspirin 325mg BID x 2weeks, then daily x 4weeks    Complete Discharge Diagnosis:    Patient Active Problem List   Diagnosis   • Osteoarthritis of multiple joints   • Hx of hepatitis   • Venous insufficiency of right leg   • Primary osteoarthritis of right hip           Follow-up Appointments  Future Appointments   Date Time Provider Department Center   8/27/2019  3:00 PM David Archuleta MD [facility]     Additional Instructions for the Follow-ups that You Need to Schedule     Discharge Follow-up with Specialty: Orthopedics; 2 Weeks   As directed      Specialty:  Orthopedics    Follow Up:  2 Weeks    Follow Up Details:  Return to the office to see Dr. David Gibson RN  08/15/19  2:16 PM    David Archuleta MD

## 2019-08-15 NOTE — PLAN OF CARE
Problem: Patient Care Overview  Goal: Plan of Care Review  Outcome: Ongoing (interventions implemented as appropriate)   08/15/19 0346   Coping/Psychosocial   Plan of Care Reviewed With patient   Plan of Care Review   Progress improving   OTHER   Outcome Summary Pt has done well through the night. Working with PT post RTH sx. Up with walker use and 1-2 assist. Able to mobilize short distances. Pain much improved with PO pain meds on adequate regimen. LUIS ARMANDO dressing remains CDI. Breathing heavy with expiratory wheezing apparent form irrtation of intubation, lungs are clear. No comorbidities to discuss. Plans to d/c to rehab today. Will continue to monitor.      Goal: Individualization and Mutuality  Outcome: Ongoing (interventions implemented as appropriate)    Goal: Discharge Needs Assessment  Outcome: Ongoing (interventions implemented as appropriate)   08/13/19 1625   Discharge Needs Assessment   Readmission Within the Last 30 Days no previous admission in last 30 days   Concerns to be Addressed adjustment to diagnosis/illness   Patient/Family Anticipates Transition to inpatient rehabilitation facility   Patient/Family Anticipated Services at Transition rehabilitation services;skilled nursing   Transportation Anticipated family or friend will provide   Discharge Facility/Level of Care Needs rehabilitation facility;nursing facility, skilled   Disability   Equipment Currently Used at Home cane, straight;walker, rolling;wheelchair;commode     Goal: Interprofessional Rounds/Family Conf  Outcome: Ongoing (interventions implemented as appropriate)   08/15/19 0346   Interdisciplinary Rounds/Family Conf   Participants nursing;patient;family;       Problem: Fall Risk (Adult)  Goal: Absence of Fall  Outcome: Ongoing (interventions implemented as appropriate)   08/15/19 0346   Fall Risk (Adult)   Absence of Fall achieves outcome       Problem: Hip Arthroplasty (Total, Partial) (Adult)  Goal: Signs and Symptoms of  Listed Potential Problems Will be Absent, Minimized or Managed (Hip Arthroplasty)  Outcome: Ongoing (interventions implemented as appropriate)   08/15/19 1097   Goal/Outcome Evaluation   Problems Assessed (Hip Arthroplasty) all   Problems Present (Hip Arthroplasty) pain;situational response

## 2019-08-15 NOTE — THERAPY TREATMENT NOTE
Acute Care - Physical Therapy Treatment Note  [facility]     Patient Name: Erum Yang  : 1933  MRN: [id number]  Today's Date: 8/15/2019             Admit Date: 2019    Visit Dx:    ICD-10-CM ICD-9-CM   1. Primary osteoarthritis of right hip M16.11 715.15     Patient Active Problem List   Diagnosis   • Osteoarthritis of multiple joints   • Hx of hepatitis   • Venous insufficiency of right leg   • Primary osteoarthritis of right hip       Therapy Treatment    Rehabilitation Treatment Summary     Row Name 08/15/19 1045             Treatment Time/Intention    Discipline  physical therapy assistant  -      Document Type  discharge treatment;therapy note (daily note)  -      Subjective Information  complains of;weakness;fatigue  -      Mode of Treatment  group therapy;physical therapy  -      Care Plan Review  patient/other agree to care plan  -      Existing Precautions/Restrictions  fall;hip, posterior;right  -2      Treatment Considerations/Comments  MD note -no hip flex >90  -2      Recorded by [] Charity Hinojosa, [facility] 08/15/19 1644  [JM2] Charity Hinojosa, [facility] 08/15/19 1647      Row Name 08/15/19 1045             Sit-Stand Transfer    Sit-Stand [place] (Transfers)  moderate assist (50% patient effort);verbal cues;nonverbal cues (demo/gesture) req 2 attempts  -      Assistive Device (Sit-Stand Transfers)  walker, front-wheeled  -      Recorded by [] Charity Hinojosa, [facility] 08/15/19 1647      Row Name 08/15/19 1045             Gait/Stairs Assessment/Training    [place] Level (Gait)  moderate assist (50% patient effort);verbal cues;nonverbal cues (demo/gesture)  -      Assistive Device (Gait)  walker, front-wheeled  -      Distance in Feet (Gait)  10  -      Deviations/Abnormal Patterns (Gait)  antalgic;ara decreased;stride length decreased  -      Bilateral Gait Deviations  forward flexed posture  -      Comment (Gait/Stairs)  diff advancing LLE due to  limited strength RLE  -JM      Recorded by [JM] Charity Hinojosa PTA 08/15/19 1647      Row Name 08/15/19 1045             Therapeutic Exercise    Comment (Therapeutic Exercise)  THR protocol x30 reps  -JM      Recorded by [JM] Charity Hinojosa PTA 08/15/19 1647      Row Name 08/15/19 1045             Positioning and Restraints    Pre-Treatment Position  sitting in chair/recliner  -JM      In Chair  reclined;call light within reach;encouraged to call for assist;with family/caregiver  -JM      Recorded by [JM] Charity Hinojosa PTA 08/15/19 1647      Row Name 08/15/19 1045             Pain Scale: Numbers Pre/Post-Treatment    Pain Scale: Numbers, Pretreatment  3/10  -JM      Pain Location - Side  Right  -      Pain Location  hip  -JM      Recorded by [] Charity Hinojosa, FRANKIE 08/15/19 1647      Row Name                Wound 08/12/19 1421 Right hip Incision    Wound - Properties Group Date first assessed: 08/12/19 [VB] Time first assessed: 1421 [VB] Side: Right [VB] Location: hip [VB] Primary Wound Type: Incision [VB] Recorded by:  [VB] Wen Dotson RN 08/12/19 1421      User Key  (r) = Recorded By, (t) = Taken By, (c) = Cosigned By    Initials Name Effective Dates Discipline     Charity Hinojosa, FRANKIE 03/07/18 -  PT    VB Wen Dotson RN 06/16/16 -  Nurse          Wound 08/12/19 1421 Right hip Incision (Active)   Dressing Appearance dry;intact;no drainage 8/15/2019  4:40 PM   Closure JASON 8/15/2019  4:40 PM   Base dressing in place, unable to visualize 8/15/2019  4:40 PM   Drainage Amount none 8/15/2019  4:40 PM   Dressing Care, Wound other (see comments) 8/15/2019  8:28 AM           Physical Therapy Education     Title: PT OT SLP Therapies (Done)     Topic: Physical Therapy (Done)     Point: Mobility training (Done)     Learning Progress Summary           Patient Acceptance, E,TB,D, VU,NR by BK at 8/14/2019  1:15 PM    Acceptance, E,TB, VU,NR by URSULA at 8/13/2019  9:02 AM                   Point: Home  exercise program (Done)     Learning Progress Summary           Patient Acceptance, E,TB,D, VU,NR by  at 8/14/2019  1:15 PM    Acceptance, E,TB, VU,NR by  at 8/13/2019  9:02 AM                   Point: Body mechanics (Done)     Learning Progress Summary           Patient Acceptance, E,TB,D, VU,NR by  at 8/14/2019  1:15 PM    Acceptance, E,TB, VU,NR by  at 8/13/2019  9:02 AM                   Point: Precautions (Done)     Learning Progress Summary           Patient Acceptance, E,TB,D, VU,NR by  at 8/14/2019  1:15 PM    Acceptance, E,TB, VU,NR by  at 8/13/2019  9:02 AM                               User Key     Initials Effective Dates Name Provider Type Discipline     05/14/18 -  Giovany Blackmon, PT Physical Therapist PT     07/05/19 -  Luda Mancini, PT Student PT Student PT                PT Recommendation and Plan           Time Calculation:   PT Charges     Row Name 08/15/19 1647             Time Calculation    Start Time  1035  -      Stop Time  1130  -      Time Calculation (min)  55 min  -      PT Received On  08/15/19  -      PT - Next Appointment  08/15/19  -         Time Calculation- PT    Total Timed Code Minutes- PT  30 minute(s)  -        User Key  (r) = Recorded By, (t) = Taken By, (c) = Cosigned By    Initials Name Provider Type     Charity Hinojosa PTA Physical Therapy Assistant        Therapy Charges for Today     Code Description Service Date Service Provider Modifiers Qty    64691048493 HC PT THER PROC EA 15 MIN 8/14/2019 Charity Hinojosa PTA GP 2    38711030504 HC PT THER PROC GROUP 8/14/2019 Charity Hinojosa PTA GP 1    05981056553 HC PT THER PROC EA 15 MIN 8/15/2019 Charity Hinojosa PTA GP 2    86208607015 HC PT THER PROC GROUP 8/15/2019 Charity Hinojosa PTA GP 1          PT G-Codes  Outcome Measure Options: AM-PAC 6 Clicks Basic Mobility (PT)  AM-PAC 6 Clicks Score (PT): 14    Charity Hinojosa PTA  8/15/2019

## 2019-08-16 ENCOUNTER — EPISODE CHANGES (OUTPATIENT)
Dept: CASE MANAGEMENT | Facility: OTHER | Age: 84
End: 2019-08-16

## 2019-08-27 ENCOUNTER — OFFICE VISIT (OUTPATIENT)
Dept: ORTHOPEDIC SURGERY | Facility: CLINIC | Age: 84
End: 2019-08-27

## 2019-08-27 VITALS — WEIGHT: 172 LBS | BODY MASS INDEX: 31.65 KG/M2 | TEMPERATURE: 98.2 F | HEIGHT: 62 IN

## 2019-08-27 DIAGNOSIS — M16.11 PRIMARY OSTEOARTHRITIS OF RIGHT HIP: Primary | ICD-10-CM

## 2019-08-27 PROCEDURE — 99024 POSTOP FOLLOW-UP VISIT: CPT | Performed by: ORTHOPAEDIC SURGERY

## 2019-08-27 PROCEDURE — 73502 X-RAY EXAM HIP UNI 2-3 VIEWS: CPT | Performed by: ORTHOPAEDIC SURGERY

## 2019-08-27 NOTE — PROGRESS NOTES
Erum Yang : 1933 MRN: [id number] DATE: 2019    DIAGNOSIS: 2 week follow up right total hip     SUBJECTIVE:Patient returns today for 2 week follow up of right total hip replacement. Patient reports doing well with no unusual complaints. Appears to be progressing appropriately.    OBJECTIVE:   Exam:. The incision is healing appropriately. No sign of infection. Range of motion is progressing as expected. The calf is soft and nontender with a negative Homans sign.    DIAGNOSTIC STUDIES  Xrays: 2 views of the right hip (AP pelvis and lateral right hip) were ordered and reviewed for evaluation of recent hip replacement. They demonstrate a well positioned, well aligned hip replacement without complicating factors noted. In comparison with previous films there has been interval implant placement.    ASSESSMENT: 2 week status post right hip replacement.    PLAN: 1) Staples removed and steri strips applied   2) PT exercises- needs home pt upon d/c from rehab  - no hip precautions   3) Discontinue BISI hose   4) Continue ice PRN   5) WBAT   6) aspirin 325 mg orally every day for 1 month   7) Follow up in 6 weeks with repeat Xrays of right hip (2views)    David Archuleta MD  2019

## 2019-09-03 ENCOUNTER — PATIENT OUTREACH (OUTPATIENT)
Dept: CASE MANAGEMENT | Facility: OTHER | Age: 84
End: 2019-09-03

## 2019-09-03 NOTE — OUTREACH NOTE
"Patient Outreach Note    Patient recently discharged from her rehab stay at [facility] on 8/28/19 with [facility] PT/OT/SN. Patient states incision line is healing, no s/s of infection noted. She continues to feel weak (had pneumonia in rehab) and feels like she is dehydrated. Reviewed hydration needs with patient. 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 Explained role of Care Advisor to patient, at this time she declines further outreach calls \"I have a nurse coming I will be ok\" No needs identified at this time.    Beatriz Peralta RN    9/3/2019, 2:10 PM      "

## 2019-09-05 ENCOUNTER — OFFICE VISIT (OUTPATIENT)
Dept: FAMILY MEDICINE CLINIC | Facility: CLINIC | Age: 84
End: 2019-09-05

## 2019-09-05 VITALS
HEIGHT: 62 IN | SYSTOLIC BLOOD PRESSURE: 120 MMHG | OXYGEN SATURATION: 98 % | WEIGHT: 164 LBS | BODY MASS INDEX: 30.18 KG/M2 | HEART RATE: 72 BPM | DIASTOLIC BLOOD PRESSURE: 64 MMHG | RESPIRATION RATE: 16 BRPM

## 2019-09-05 DIAGNOSIS — Z96.641 STATUS POST RIGHT HIP REPLACEMENT: ICD-10-CM

## 2019-09-05 DIAGNOSIS — M15.9 OSTEOARTHRITIS OF MULTIPLE JOINTS, UNSPECIFIED OSTEOARTHRITIS TYPE: ICD-10-CM

## 2019-09-05 DIAGNOSIS — D64.9 ANEMIA, UNSPECIFIED TYPE: Primary | ICD-10-CM

## 2019-09-05 DIAGNOSIS — R53.83 OTHER FATIGUE: ICD-10-CM

## 2019-09-05 LAB
ERYTHROCYTE [DISTWIDTH] IN BLOOD BY AUTOMATED COUNT: 15.4 % (ref 12.3–15.4)
HCT VFR BLD AUTO: 34.2 % (ref 34–46.6)
HGB BLD-MCNC: 10.4 G/DL (ref 12–15.9)
MCH RBC QN AUTO: 26.5 PG (ref 26.6–33)
MCHC RBC AUTO-ENTMCNC: 30.4 G/DL (ref 31.5–35.7)
MCV RBC AUTO: 87.2 FL (ref 79–97)
PLATELET # BLD AUTO: 509 10*3/MM3 (ref 140–450)
RBC # BLD AUTO: 3.92 10*6/MM3 (ref 3.77–5.28)
WBC # BLD AUTO: 8.45 10*3/MM3 (ref 3.4–10.8)

## 2019-09-05 PROCEDURE — 99214 OFFICE O/P EST MOD 30 MIN: CPT | Performed by: FAMILY MEDICINE

## 2019-09-05 RX ORDER — FERROUS SULFATE 325(65) MG
325 TABLET ORAL
COMMUNITY
End: 2019-12-20

## 2019-09-05 NOTE — PROGRESS NOTES
Subjective   Erum Yang is a 85 y.o. female.     History of Present Illness   Here today for follow up after THR and 13 day stay at [place].  She states she had pneumonia post op and was treated w/ Rocephin.  She states she then had diarrhea x 4 days.  She is convinced it was  protonix and she stopped it. She has never taken Protonix before,  She states her stools were black and tarry.  She does take an iron supplement.  She continues to take Mobic as well.  She is here for further evaluation.  She came home on the 28th. She feels she is eating better,  She states that her stools are normal  now and have been for 3 days.    She is fatigued and has chronic iron deficiency anemia that was much worse while in the hospital.  Her right hip has been replaced but she is still struggling with walking. She has home PT at this time.    The following portions of the patient's history were reviewed and updated as appropriate: allergies, current medications, past family history, past medical history, past social history, past surgical history and problem list.    Review of Systems   Constitutional: Positive for fatigue.   HENT: Negative.    Eyes: Negative.    Respiratory: Positive for wheezing.    Cardiovascular: Positive for leg swelling.   Genitourinary: Negative.    Musculoskeletal: Positive for arthralgias and myalgias.   Neurological: Negative.    Psychiatric/Behavioral: Negative.    All other systems reviewed and are negative.      Objective   Physical Exam   Constitutional: She is oriented to person, place, and time. No distress.   Ambulating with a walker   HENT:   Head: Normocephalic and atraumatic.   Eyes: EOM are normal. Pupils are equal, round, and reactive to light.   Cardiovascular: Normal rate and regular rhythm.   Murmur heard.  Pulmonary/Chest: Effort normal. She has no wheezes.   Neurological: She is alert and oriented to person, place, and time.   Skin: Skin is warm and dry. No pallor.   Psychiatric: She has  a normal mood and affect. Her behavior is normal. Judgment and thought content normal.   Nursing note and vitals reviewed.        Assessment/Plan   Erum was seen today for post-op problem.    Diagnoses and all orders for this visit:    Anemia, unspecified type  Fatigue  Her last Hgb in our system was 9.4  I will check labs today to see how she is doing . She is very fatigued.  -     Iron and TIBC  -     Ferritin  -     CBC (No Diff)    Osteoarthritis of multiple joints, unspecified osteoarthritis type  She is taking meloxicam but does not feel it helps much.    Status post right hip replacement  She is slowly improving and had a definite set back after getting sick in the nursing home.      Current outpatient and discharge medications have been reconciled for the patient.  Reviewed by: Hank Saunders MD

## 2019-09-06 PROBLEM — Z96.641 STATUS POST RIGHT HIP REPLACEMENT: Status: ACTIVE | Noted: 2019-09-06

## 2019-09-06 LAB
FERRITIN SERPL-MCNC: 212 NG/ML (ref 13–150)
IRON SATN MFR SERPL: 18 % (ref 20–50)
IRON SERPL-MCNC: 53 MCG/DL (ref 37–145)
TIBC SERPL-MCNC: 299 MCG/DL
UIBC SERPL-MCNC: 246 MCG/DL (ref 112–346)

## 2019-09-12 ENCOUNTER — EPISODE CHANGES (OUTPATIENT)
Dept: CASE MANAGEMENT | Facility: OTHER | Age: 84
End: 2019-09-12

## 2019-10-10 ENCOUNTER — OFFICE VISIT (OUTPATIENT)
Dept: ORTHOPEDIC SURGERY | Facility: CLINIC | Age: 84
End: 2019-10-10

## 2019-10-10 VITALS — HEIGHT: 62 IN | WEIGHT: 162 LBS | BODY MASS INDEX: 29.81 KG/M2

## 2019-10-10 DIAGNOSIS — M16.11 PRIMARY OSTEOARTHRITIS OF RIGHT HIP: Primary | ICD-10-CM

## 2019-10-10 PROCEDURE — 99024 POSTOP FOLLOW-UP VISIT: CPT | Performed by: NURSE PRACTITIONER

## 2019-10-10 PROCEDURE — 73502 X-RAY EXAM HIP UNI 2-3 VIEWS: CPT | Performed by: NURSE PRACTITIONER

## 2019-10-10 NOTE — PROGRESS NOTES
Erum Yang : 1933 MRN: [id number] DATE: 10/10/2019    DIAGNOSIS: 8 week follow up right total hip     SUBJECTIVE:Patient returns today for 8 week follow up of right total hip replacement. Patient reports doing well with no unusual complaints. Appears to be progressing appropriately and is using a walker    OBJECTIVE:   Exam:. The incision is healed. No sign of infection. Range of motion is progressing as expected. The calf is soft and nontender with a negative Homans sign. Strength progressing    DIAGNOSTIC STUDIES  Xrays: 2 views of the right hip (AP pelvis and lateral right hip) were ordered and reviewed for evaluation of recent hip replacement. They demonstrate a well positioned, well aligned hip replacement without complicating factors noted. In comparison with previous films there has been interval implant placement.    ASSESSMENT: 8 week status post right hip replacement.    PLAN: 1) Activity as tolerated   2) Continue hip strengthening exercises    3) Follow up 1 year post-op with repeat Xrays of right hip (2views AP Pelvis      and lateral left hip)    Kristi Silva, APRNAVI  10/10/2019

## 2019-11-08 ENCOUNTER — CONSULT (OUTPATIENT)
Dept: ORTHOPEDIC SURGERY | Facility: CLINIC | Age: 84
End: 2019-11-08

## 2019-11-08 VITALS — TEMPERATURE: 97.9 F | BODY MASS INDEX: 29.81 KG/M2 | HEIGHT: 62 IN | WEIGHT: 162 LBS

## 2019-11-08 DIAGNOSIS — M25.511 BILATERAL SHOULDER PAIN, UNSPECIFIED CHRONICITY: Primary | ICD-10-CM

## 2019-11-08 DIAGNOSIS — M25.512 BILATERAL SHOULDER PAIN, UNSPECIFIED CHRONICITY: Primary | ICD-10-CM

## 2019-11-08 PROCEDURE — 73030 X-RAY EXAM OF SHOULDER: CPT | Performed by: ORTHOPAEDIC SURGERY

## 2019-11-08 PROCEDURE — 20610 DRAIN/INJ JOINT/BURSA W/O US: CPT | Performed by: ORTHOPAEDIC SURGERY

## 2019-11-08 PROCEDURE — 99204 OFFICE O/P NEW MOD 45 MIN: CPT | Performed by: ORTHOPAEDIC SURGERY

## 2019-11-08 RX ADMIN — METHYLPREDNISOLONE ACETATE 80 MG: 80 INJECTION, SUSPENSION INTRA-ARTICULAR; INTRALESIONAL; INTRAMUSCULAR; SOFT TISSUE at 15:34

## 2019-11-08 RX ADMIN — METHYLPREDNISOLONE ACETATE 80 MG: 80 INJECTION, SUSPENSION INTRA-ARTICULAR; INTRALESIONAL; INTRAMUSCULAR; SOFT TISSUE at 15:32
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
## 2019-11-11 RX ORDER — METHYLPREDNISOLONE ACETATE 80 MG/ML
80 INJECTION, SUSPENSION INTRA-ARTICULAR; INTRALESIONAL; INTRAMUSCULAR; SOFT TISSUE
Status: COMPLETED | OUTPATIENT
Start: 2019-11-08 | End: 2019-11-08

## 2019-12-13 ENCOUNTER — OFFICE VISIT (OUTPATIENT)
Dept: ORTHOPEDIC SURGERY | Facility: CLINIC | Age: 84
End: 2019-12-13

## 2019-12-13 VITALS — HEIGHT: 62 IN | TEMPERATURE: 98.3 F | BODY MASS INDEX: 28.52 KG/M2 | WEIGHT: 155 LBS

## 2019-12-13 DIAGNOSIS — M25.561 PAIN IN BOTH KNEES, UNSPECIFIED CHRONICITY: Primary | ICD-10-CM

## 2019-12-13 DIAGNOSIS — M25.562 PAIN IN BOTH KNEES, UNSPECIFIED CHRONICITY: Primary | ICD-10-CM

## 2019-12-13 PROCEDURE — 20610 DRAIN/INJ JOINT/BURSA W/O US: CPT | Performed by: NURSE PRACTITIONER

## 2019-12-13 PROCEDURE — 99213 OFFICE O/P EST LOW 20 MIN: CPT | Performed by: NURSE PRACTITIONER

## 2019-12-13 PROCEDURE — 73562 X-RAY EXAM OF KNEE 3: CPT | Performed by: NURSE PRACTITIONER

## 2019-12-13 RX ORDER — METHYLPREDNISOLONE ACETATE 80 MG/ML
80 INJECTION, SUSPENSION INTRA-ARTICULAR; INTRALESIONAL; INTRAMUSCULAR; SOFT TISSUE
Status: COMPLETED | OUTPATIENT
Start: 2019-12-13 | End: 2019-12-13

## 2019-12-13 RX ADMIN — METHYLPREDNISOLONE ACETATE 80 MG: 80 INJECTION, SUSPENSION INTRA-ARTICULAR; INTRALESIONAL; INTRAMUSCULAR; SOFT TISSUE at 16:44

## 2019-12-13 NOTE — PROGRESS NOTES
"Patient: Erum Yang  YOB: 1933 86 y.o. female  Medical Record Number: [id number]    Chief Complaints:   Chief Complaint   Patient presents with   • Left Knee - Follow-up, Pain   • Right Knee - Pain, Follow-up   • Injections       History of Present Illness:Erum Yang is a 86 y.o. female who presents with complaints of bilateral knee pain.  Patient reports she has had pain off and on for many years but worse the last 2 to 3 months.  Denies any injury.  Describes the knee pain as a moderate intermittent ache with occasional swelling, worse with standing sitting walking, better with rest.    Allergies: No Known Allergies    Medications:   Current Outpatient Medications   Medication Sig Dispense Refill   • aspirin  MG EC tablet Take 1 tablet by mouth 2 (Two) Times a Day.     • ferrous sulfate 325 (65 FE) MG tablet Take 325 mg by mouth Daily With Breakfast.     • Lactobacillus (FLORANEX PO) Take  by mouth.     • HYDROcodone-acetaminophen (NORCO) 7.5-325 MG per tablet Take 1-2 tabs po q 4 hours prn pain 84 tablet 0     No current facility-administered medications for this visit.          The following portions of the patient's history were reviewed and updated as appropriate: allergies, current medications, past family history, past medical history, past social history, past surgical history and problem list.    Review of Systems:   A 14 point review of systems was performed. All systems negative except pertinent positives/negative listed in HPI above    Physical Exam:   Vitals:    12/13/19 1615   Temp: 98.3 °F (36.8 °C)   Weight: 70.3 kg (155 lb)   Height: 157.5 cm (62\")       General: A and O x 3, ASA, NAD    SCLERA:    Normal    DENTITION:   Normal  Skin clear no unusual lesions noted  Bilateral knees patient has trace amount of effusion noted bilaterally with 110 degrees flexion neutral and extension with a positive Radha negative Lockman calf soft nontender    Radiology:  Xrays 3views " (ap,lateral, sunrise) bilateral knees were ordered and reviewed today secondary to pain show significant bone-on-bone end-stage osteoarthritis with cyst and spur formation.  She also has a benign appearing endochondroma noted distal left femur.  Dr. Archuleta also reviewed x-rays.  No compared to views available    Assessment/Plan:  End-stage osteoarthritis bilateral knees    Patient I discussed treatment options.  She would like to proceed with bilateral knee cortisone injections.  Prior to injections risks were discussed including pain and infection.  Patient verbalized understanding would like to proceed.  She will try office in 3 months for follow-up and we will repeat x-rays of her knees and also check the stability of the endochondroma    Large Joint Arthrocentesis: R knee  Date/Time: 12/13/2019 4:44 PM  Consent given by: patient  Site marked: site marked  Timeout: Immediately prior to procedure a time out was called to verify the correct patient, procedure, equipment, support staff and site/side marked as required   Supporting Documentation  Indications: pain and joint swelling   Procedure Details  Location: knee - R knee  Preparation: Patient was prepped and draped in the usual sterile fashion  Needle gauge: 21.  Approach: anterolateral  Medications administered: 80 mg methylPREDNISolone acetate 80 MG/ML; 2 mL lidocaine (cardiac)  Patient tolerance: patient tolerated the procedure well with no immediate complications    Large Joint Arthrocentesis: L knee  Date/Time: 12/13/2019 4:44 PM  Consent given by: patient  Site marked: site marked  Timeout: Immediately prior to procedure a time out was called to verify the correct patient, procedure, equipment, support staff and site/side marked as required   Supporting Documentation  Indications: pain and joint swelling   Procedure Details  Location: knee - L knee  Preparation: Patient was prepped and draped in the usual sterile fashion  Needle gauge: 21.  Approach:  anterolateral  Medications administered: 80 mg methylPREDNISolone acetate 80 MG/ML; 2 mL lidocaine (cardiac)  Patient tolerance: patient tolerated the procedure well with no immediate complications

## 2019-12-20 ENCOUNTER — OFFICE VISIT (OUTPATIENT)
Dept: FAMILY MEDICINE CLINIC | Facility: CLINIC | Age: 84
End: 2019-12-20

## 2019-12-20 VITALS
SYSTOLIC BLOOD PRESSURE: 142 MMHG | DIASTOLIC BLOOD PRESSURE: 76 MMHG | RESPIRATION RATE: 16 BRPM | HEART RATE: 72 BPM | OXYGEN SATURATION: 93 % | HEIGHT: 62 IN | WEIGHT: 157 LBS | BODY MASS INDEX: 28.89 KG/M2

## 2019-12-20 DIAGNOSIS — D50.0 IRON DEFICIENCY ANEMIA DUE TO CHRONIC BLOOD LOSS: Primary | ICD-10-CM

## 2019-12-20 PROCEDURE — 99213 OFFICE O/P EST LOW 20 MIN: CPT | Performed by: FAMILY MEDICINE

## 2019-12-20 NOTE — PROGRESS NOTES
Subjective   Erum Yang is a 86 y.o. female.     History of Present Illness   Erum is here for 3 mo follow up.  She states she is doing well.  She had Rt THR 4 mos ago and was anemic following.  She states she stopped her iron supplement about 2 mos ago.She is no longer on Advil .She is still using a walker. She feels she is getting better and will start trying a cane at home.   She has a hx of anemia and she was given an iron supplement  For 2 months. She needs a CBC today .    The following portions of the patient's history were reviewed and updated as appropriate: allergies, current medications, past family history, past medical history, past social history, past surgical history and problem list.    Review of Systems   Constitutional: Positive for fatigue.   HENT: Negative.    Respiratory: Negative for shortness of breath.    Cardiovascular: Negative for chest pain.   Musculoskeletal: Positive for arthralgias and gait problem.   Skin: Negative.    Psychiatric/Behavioral: Negative.    All other systems reviewed and are negative.      Objective   Physical Exam   Constitutional: She is oriented to person, place, and time. No distress.   Ambulating with a walker   HENT:   Head: Normocephalic and atraumatic.   Eyes: Pupils are equal, round, and reactive to light. EOM are normal.   Cardiovascular: Normal rate and regular rhythm.   Murmur heard.  Pulmonary/Chest: Effort normal. She has no wheezes.   Neurological: She is alert and oriented to person, place, and time.   Skin: Skin is warm and dry. No pallor.   Psychiatric: She has a normal mood and affect. Her behavior is normal. Judgment and thought content normal.   Nursing note and vitals reviewed.        Assessment/Plan   Erum was seen today for anemia.    Diagnoses and all orders for this visit:    Iron deficiency anemia due to chronic blood loss  -     CBC (No Diff)      Patient Instructions   I will call you with lab results.  Please get your flu and Pneumovax 23  shots today at the pharmacy

## 2019-12-20 NOTE — PATIENT INSTRUCTIONS
I will call you with lab results.  Please get your flu and Pneumovax 23 shots today at the pharmacy

## 2019-12-21 LAB
ERYTHROCYTE [DISTWIDTH] IN BLOOD BY AUTOMATED COUNT: 16.9 % (ref 12.3–15.4)
HCT VFR BLD AUTO: 36.5 % (ref 34–46.6)
HGB BLD-MCNC: 11.6 G/DL (ref 11.1–15.9)
MCH RBC QN AUTO: 26.5 PG (ref 26.6–33)
MCHC RBC AUTO-ENTMCNC: 31.8 G/DL (ref 31.5–35.7)
MCV RBC AUTO: 83 FL (ref 79–97)
PLATELET # BLD AUTO: 471 X10E3/UL (ref 150–450)
RBC # BLD AUTO: 4.38 X10E6/UL (ref 3.77–5.28)
WBC # BLD AUTO: 11 X10E3/UL (ref 3.4–10.8)

## 2019-12-30 ENCOUNTER — TELEPHONE (OUTPATIENT)
Dept: FAMILY MEDICINE CLINIC | Facility: CLINIC | Age: 84
End: 2019-12-30

## 2019-12-30 NOTE — TELEPHONE ENCOUNTER
----- Message from Claudia Palacio MA sent at 12/23/2019  5:17 PM EST -----  Please call pt and schedule non fasting lab appt for 4 weeks

## 2020-01-01 ENCOUNTER — INFUSION (OUTPATIENT)
Dept: ONCOLOGY | Facility: HOSPITAL | Age: 85
End: 2020-01-01

## 2020-01-01 ENCOUNTER — OFFICE VISIT (OUTPATIENT)
Dept: ORTHOPEDIC SURGERY | Facility: CLINIC | Age: 85
End: 2020-01-01

## 2020-01-01 ENCOUNTER — OFFICE VISIT (OUTPATIENT)
Dept: FAMILY MEDICINE CLINIC | Facility: CLINIC | Age: 85
End: 2020-01-01

## 2020-01-01 ENCOUNTER — TELEPHONE (OUTPATIENT)
Dept: ONCOLOGY | Facility: CLINIC | Age: 85
End: 2020-01-01

## 2020-01-01 ENCOUNTER — OFFICE VISIT (OUTPATIENT)
Dept: ONCOLOGY | Facility: CLINIC | Age: 85
End: 2020-01-01

## 2020-01-01 ENCOUNTER — APPOINTMENT (OUTPATIENT)
Dept: ONCOLOGY | Facility: HOSPITAL | Age: 85
End: 2020-01-01

## 2020-01-01 ENCOUNTER — LAB (OUTPATIENT)
Dept: LAB | Facility: HOSPITAL | Age: 85
End: 2020-01-01

## 2020-01-01 VITALS
WEIGHT: 145 LBS | BODY MASS INDEX: 26.52 KG/M2 | TEMPERATURE: 97.8 F | RESPIRATION RATE: 16 BRPM | SYSTOLIC BLOOD PRESSURE: 127 MMHG | HEART RATE: 77 BPM | OXYGEN SATURATION: 95 % | DIASTOLIC BLOOD PRESSURE: 73 MMHG

## 2020-01-01 VITALS
OXYGEN SATURATION: 98 % | SYSTOLIC BLOOD PRESSURE: 152 MMHG | WEIGHT: 150 LBS | RESPIRATION RATE: 14 BRPM | BODY MASS INDEX: 27.6 KG/M2 | DIASTOLIC BLOOD PRESSURE: 72 MMHG | HEART RATE: 81 BPM | HEIGHT: 62 IN

## 2020-01-01 VITALS
HEART RATE: 76 BPM | SYSTOLIC BLOOD PRESSURE: 133 MMHG | TEMPERATURE: 97.8 F | RESPIRATION RATE: 18 BRPM | DIASTOLIC BLOOD PRESSURE: 82 MMHG | BODY MASS INDEX: 26.51 KG/M2 | OXYGEN SATURATION: 96 % | HEIGHT: 62 IN

## 2020-01-01 VITALS
BODY MASS INDEX: 27.8 KG/M2 | WEIGHT: 152 LBS | DIASTOLIC BLOOD PRESSURE: 80 MMHG | RESPIRATION RATE: 16 BRPM | HEART RATE: 74 BPM | OXYGEN SATURATION: 96 % | TEMPERATURE: 98.2 F | SYSTOLIC BLOOD PRESSURE: 147 MMHG

## 2020-01-01 VITALS — BODY MASS INDEX: 26.68 KG/M2 | HEIGHT: 62 IN | WEIGHT: 145 LBS | TEMPERATURE: 98.8 F

## 2020-01-01 DIAGNOSIS — D50.0 IRON DEFICIENCY ANEMIA DUE TO CHRONIC BLOOD LOSS: ICD-10-CM

## 2020-01-01 DIAGNOSIS — Z13.220 SCREENING FOR HYPERLIPIDEMIA: ICD-10-CM

## 2020-01-01 DIAGNOSIS — Z00.00 MEDICARE ANNUAL WELLNESS VISIT, SUBSEQUENT: Primary | ICD-10-CM

## 2020-01-01 DIAGNOSIS — Z23 NEED FOR VACCINATION: ICD-10-CM

## 2020-01-01 DIAGNOSIS — K90.9 IRON MALABSORPTION: Primary | ICD-10-CM

## 2020-01-01 DIAGNOSIS — M25.551 RIGHT HIP PAIN: Primary | ICD-10-CM

## 2020-01-01 DIAGNOSIS — D50.0 IRON DEFICIENCY ANEMIA DUE TO CHRONIC BLOOD LOSS: Primary | ICD-10-CM

## 2020-01-01 LAB
ALBUMIN SERPL-MCNC: 3.9 G/DL (ref 3.6–4.6)
ALBUMIN/GLOB SERPL: 1.7 {RATIO} (ref 1.2–2.2)
ALP SERPL-CCNC: 135 IU/L (ref 39–117)
ALT SERPL-CCNC: 20 IU/L (ref 0–32)
AST SERPL-CCNC: 17 IU/L (ref 0–40)
BASOPHILS # BLD AUTO: 0 X10E3/UL (ref 0–0.2)
BASOPHILS # BLD AUTO: 0.01 10*3/MM3 (ref 0–0.2)
BASOPHILS NFR BLD AUTO: 0 %
BASOPHILS NFR BLD AUTO: 0.1 % (ref 0–1.5)
BILIRUB SERPL-MCNC: 0.4 MG/DL (ref 0–1.2)
BUN SERPL-MCNC: 18 MG/DL (ref 8–27)
BUN/CREAT SERPL: 36 (ref 12–28)
CALCIUM SERPL-MCNC: 9.2 MG/DL (ref 8.7–10.3)
CHLORIDE SERPL-SCNC: 103 MMOL/L (ref 96–106)
CHOLEST SERPL-MCNC: 205 MG/DL (ref 100–199)
CHOLEST/HDLC SERPL: 2.7 RATIO (ref 0–4.4)
CO2 SERPL-SCNC: 28 MMOL/L (ref 20–29)
CREAT SERPL-MCNC: 0.5 MG/DL (ref 0.57–1)
DEPRECATED RDW RBC AUTO: 73.2 FL (ref 37–54)
EOSINOPHIL # BLD AUTO: 0.01 10*3/MM3 (ref 0–0.4)
EOSINOPHIL # BLD AUTO: 0.1 X10E3/UL (ref 0–0.4)
EOSINOPHIL NFR BLD AUTO: 0.1 % (ref 0.3–6.2)
EOSINOPHIL NFR BLD AUTO: 1 %
ERYTHROCYTE [DISTWIDTH] IN BLOOD BY AUTOMATED COUNT: 15.8 % (ref 11.7–15.4)
ERYTHROCYTE [DISTWIDTH] IN BLOOD BY AUTOMATED COUNT: 22.2 % (ref 12.3–15.4)
FERRITIN SERPL-MCNC: 1534.1 NG/ML (ref 13–150)
FERRITIN SERPL-MCNC: 974 NG/ML (ref 15–150)
GLOBULIN SER CALC-MCNC: 2.3 G/DL (ref 1.5–4.5)
GLUCOSE SERPL-MCNC: 80 MG/DL (ref 65–99)
HCT VFR BLD AUTO: 41.1 % (ref 34–46.6)
HCT VFR BLD AUTO: 44 % (ref 34–46.6)
HDLC SERPL-MCNC: 75 MG/DL
HGB BLD-MCNC: 13.6 G/DL (ref 12–15.9)
HGB BLD-MCNC: 13.7 G/DL (ref 11.1–15.9)
IMM GRANULOCYTES # BLD AUTO: 0.04 10*3/MM3 (ref 0–0.05)
IMM GRANULOCYTES # BLD AUTO: 0.1 X10E3/UL (ref 0–0.1)
IMM GRANULOCYTES NFR BLD AUTO: 0.4 % (ref 0–0.5)
IMM GRANULOCYTES NFR BLD AUTO: 1 %
IRON 24H UR-MRATE: 69 MCG/DL (ref 37–145)
IRON SATN MFR SERPL: 26 % (ref 14–48)
IRON SATN MFR SERPL: 31 % (ref 15–55)
IRON SERPL-MCNC: 76 UG/DL (ref 27–139)
LDLC SERPL CALC-MCNC: 119 MG/DL (ref 0–99)
LYMPHOCYTES # BLD AUTO: 0.58 10*3/MM3 (ref 0.7–3.1)
LYMPHOCYTES # BLD AUTO: 1.1 X10E3/UL (ref 0.7–3.1)
LYMPHOCYTES NFR BLD AUTO: 12 %
LYMPHOCYTES NFR BLD AUTO: 5.9 % (ref 19.6–45.3)
MCH RBC QN AUTO: 28.1 PG (ref 26.6–33)
MCH RBC QN AUTO: 30.5 PG (ref 26.6–33)
MCHC RBC AUTO-ENTMCNC: 30.9 G/DL (ref 31.5–35.7)
MCHC RBC AUTO-ENTMCNC: 33.3 G/DL (ref 31.5–35.7)
MCV RBC AUTO: 90.9 FL (ref 79–97)
MCV RBC AUTO: 92 FL (ref 79–97)
MONOCYTES # BLD AUTO: 0.42 10*3/MM3 (ref 0.1–0.9)
MONOCYTES # BLD AUTO: 0.6 X10E3/UL (ref 0.1–0.9)
MONOCYTES NFR BLD AUTO: 4.3 % (ref 5–12)
MONOCYTES NFR BLD AUTO: 7 %
NEUTROPHILS # BLD AUTO: 7.2 X10E3/UL (ref 1.4–7)
NEUTROPHILS NFR BLD AUTO: 79 %
NEUTROPHILS NFR BLD AUTO: 8.76 10*3/MM3 (ref 1.7–7)
NEUTROPHILS NFR BLD AUTO: 89.2 % (ref 42.7–76)
NRBC BLD AUTO-RTO: 0 /100 WBC (ref 0–0.2)
PLATELET # BLD AUTO: 143 10*3/MM3 (ref 140–450)
PLATELET # BLD AUTO: 254 X10E3/UL (ref 150–450)
PMV BLD AUTO: 10 FL (ref 6–12)
POTASSIUM SERPL-SCNC: 4.4 MMOL/L (ref 3.5–5.2)
PROT SERPL-MCNC: 6.2 G/DL (ref 6–8.5)
RBC # BLD AUTO: 4.49 X10E6/UL (ref 3.77–5.28)
RBC # BLD AUTO: 4.84 10*6/MM3 (ref 3.77–5.28)
SODIUM SERPL-SCNC: 142 MMOL/L (ref 134–144)
TIBC SERPL-MCNC: 247 UG/DL (ref 250–450)
TIBC SERPL-MCNC: 270 MCG/DL (ref 249–505)
TRANSFERRIN SERPL-MCNC: 193 MG/DL (ref 200–360)
TRIGL SERPL-MCNC: 58 MG/DL (ref 0–149)
UIBC SERPL-MCNC: 171 UG/DL (ref 118–369)
VLDLC SERPL CALC-MCNC: 11 MG/DL (ref 5–40)
WBC # BLD AUTO: 8.9 X10E3/UL (ref 3.4–10.8)
WBC # BLD AUTO: 9.82 10*3/MM3 (ref 3.4–10.8)

## 2020-01-01 PROCEDURE — G0008 ADMIN INFLUENZA VIRUS VAC: HCPCS | Performed by: NURSE PRACTITIONER

## 2020-01-01 PROCEDURE — 85025 COMPLETE CBC W/AUTO DIFF WBC: CPT

## 2020-01-01 PROCEDURE — 96374 THER/PROPH/DIAG INJ IV PUSH: CPT

## 2020-01-01 PROCEDURE — 99212 OFFICE O/P EST SF 10 MIN: CPT | Performed by: ORTHOPAEDIC SURGERY

## 2020-01-01 PROCEDURE — 84466 ASSAY OF TRANSFERRIN: CPT

## 2020-01-01 PROCEDURE — 73502 X-RAY EXAM HIP UNI 2-3 VIEWS: CPT | Performed by: ORTHOPAEDIC SURGERY

## 2020-01-01 PROCEDURE — 96365 THER/PROPH/DIAG IV INF INIT: CPT

## 2020-01-01 PROCEDURE — 25010000002 FERRIC CARBOXYMALTOSE 750 MG/15ML SOLUTION 15 ML VIAL: Performed by: INTERNAL MEDICINE

## 2020-01-01 PROCEDURE — G0439 PPPS, SUBSEQ VISIT: HCPCS | Performed by: NURSE PRACTITIONER

## 2020-01-01 PROCEDURE — 82728 ASSAY OF FERRITIN: CPT

## 2020-01-01 PROCEDURE — 90694 VACC AIIV4 NO PRSRV 0.5ML IM: CPT | Performed by: NURSE PRACTITIONER

## 2020-01-01 PROCEDURE — 63710000001 PROCHLORPERAZINE MALEATE PER 10 MG: Performed by: INTERNAL MEDICINE

## 2020-01-01 PROCEDURE — 36415 COLL VENOUS BLD VENIPUNCTURE: CPT

## 2020-01-01 PROCEDURE — 83540 ASSAY OF IRON: CPT

## 2020-01-01 PROCEDURE — 99213 OFFICE O/P EST LOW 20 MIN: CPT | Performed by: INTERNAL MEDICINE

## 2020-01-01 PROCEDURE — 96416 CHEMO PROLONG INFUSE W/PUMP: CPT

## 2020-01-01 RX ORDER — SODIUM CHLORIDE 9 MG/ML
250 INJECTION, SOLUTION INTRAVENOUS ONCE
Status: COMPLETED | OUTPATIENT
Start: 2020-01-01 | End: 2020-01-01

## 2020-01-01 RX ORDER — PROCHLORPERAZINE MALEATE 10 MG
10 TABLET ORAL ONCE
Status: COMPLETED | OUTPATIENT
Start: 2020-01-01 | End: 2020-01-01

## 2020-01-01 RX ORDER — PROCHLORPERAZINE MALEATE 5 MG/1
10 TABLET ORAL ONCE
Status: DISCONTINUED | OUTPATIENT
Start: 2020-01-01 | End: 2020-01-01 | Stop reason: HOSPADM

## 2020-01-01 RX ADMIN — FERRIC CARBOXYMALTOSE INJECTION 750 MG: 50 INJECTION, SOLUTION INTRAVENOUS at 14:11

## 2020-01-01 RX ADMIN — SODIUM CHLORIDE 250 ML: 900 INJECTION, SOLUTION INTRAVENOUS at 15:12

## 2020-01-01 RX ADMIN — FERRIC CARBOXYMALTOSE INJECTION 750 MG: 50 INJECTION, SOLUTION INTRAVENOUS at 15:12

## 2020-01-01 RX ADMIN — SODIUM CHLORIDE 250 ML: 9 INJECTION, SOLUTION INTRAVENOUS at 14:09

## 2020-01-01 RX ADMIN — PROCHLORPERAZINE MALEATE 10 MG: 10 TABLET, FILM COATED ORAL at 15:16

## 2020-02-06 DIAGNOSIS — D72.829 LEUKOCYTOSIS, UNSPECIFIED TYPE: Primary | ICD-10-CM

## 2020-02-14 LAB
BASOPHILS # BLD AUTO: 0.06 10*3/MM3 (ref 0–0.2)
BASOPHILS NFR BLD AUTO: 0.7 % (ref 0–1.5)
EOSINOPHIL # BLD AUTO: 0.17 10*3/MM3 (ref 0–0.4)
EOSINOPHIL NFR BLD AUTO: 2.1 % (ref 0.3–6.2)
ERYTHROCYTE [DISTWIDTH] IN BLOOD BY AUTOMATED COUNT: 14.7 % (ref 12.3–15.4)
HCT VFR BLD AUTO: 29.4 % (ref 34–46.6)
HGB BLD-MCNC: 9.2 G/DL (ref 12–15.9)
IMM GRANULOCYTES # BLD AUTO: 0.03 10*3/MM3 (ref 0–0.05)
IMM GRANULOCYTES NFR BLD AUTO: 0.4 % (ref 0–0.5)
LYMPHOCYTES # BLD AUTO: 1.05 10*3/MM3 (ref 0.7–3.1)
LYMPHOCYTES NFR BLD AUTO: 12.9 % (ref 19.6–45.3)
MCH RBC QN AUTO: 25.3 PG (ref 26.6–33)
MCHC RBC AUTO-ENTMCNC: 31.3 G/DL (ref 31.5–35.7)
MCV RBC AUTO: 81 FL (ref 79–97)
MONOCYTES # BLD AUTO: 0.71 10*3/MM3 (ref 0.1–0.9)
MONOCYTES NFR BLD AUTO: 8.7 % (ref 5–12)
NEUTROPHILS # BLD AUTO: 6.15 10*3/MM3 (ref 1.7–7)
NEUTROPHILS NFR BLD AUTO: 75.2 % (ref 42.7–76)
NRBC BLD AUTO-RTO: 0 /100 WBC (ref 0–0.2)
PLATELET # BLD AUTO: 522 10*3/MM3 (ref 140–450)
RBC # BLD AUTO: 3.63 10*6/MM3 (ref 3.77–5.28)
WBC # BLD AUTO: 8.17 10*3/MM3 (ref 3.4–10.8)

## 2020-02-18 DIAGNOSIS — D50.9 IRON DEFICIENCY ANEMIA, UNSPECIFIED IRON DEFICIENCY ANEMIA TYPE: Primary | ICD-10-CM

## 2020-02-21 ENCOUNTER — CLINICAL SUPPORT (OUTPATIENT)
Dept: ORTHOPEDIC SURGERY | Facility: CLINIC | Age: 85
End: 2020-02-21

## 2020-02-21 VITALS — HEIGHT: 62 IN | TEMPERATURE: 97.3 F | BODY MASS INDEX: 29.44 KG/M2 | WEIGHT: 160 LBS

## 2020-02-21 DIAGNOSIS — M19.012 PRIMARY OSTEOARTHRITIS OF BOTH SHOULDERS: Primary | ICD-10-CM

## 2020-02-21 DIAGNOSIS — M19.011 PRIMARY OSTEOARTHRITIS OF BOTH SHOULDERS: Primary | ICD-10-CM

## 2020-02-21 PROCEDURE — 20610 DRAIN/INJ JOINT/BURSA W/O US: CPT | Performed by: NURSE PRACTITIONER

## 2020-02-21 RX ORDER — METHYLPREDNISOLONE ACETATE 80 MG/ML
80 INJECTION, SUSPENSION INTRA-ARTICULAR; INTRALESIONAL; INTRAMUSCULAR; SOFT TISSUE
Status: COMPLETED | OUTPATIENT
Start: 2020-02-21 | End: 2020-02-21

## 2020-02-21 RX ADMIN — METHYLPREDNISOLONE ACETATE 80 MG: 80 INJECTION, SUSPENSION INTRA-ARTICULAR; INTRALESIONAL; INTRAMUSCULAR; SOFT TISSUE at 12:35

## 2020-02-21 NOTE — PROGRESS NOTES
Mx. Yang comes in today for follow-up.  Injections have worked well in the past.  The patient would like to get a repeat injections today.  The risks, benefits and alternatives were discussed and the patient consented.  Going forward, the patient will follow-up as needed.    RILEY Block    02/21/2020    Large Joint Arthrocentesis: R glenohumeral  Date/Time: 2/21/2020 12:35 PM  Consent given by: patient  Site marked: site marked  Timeout: Immediately prior to procedure a time out was called to verify the correct patient, procedure, equipment, support staff and site/side marked as required   Supporting Documentation  Indications: pain   Procedure Details  Location: shoulder - R glenohumeral  Preparation: Patient was prepped and draped in the usual sterile fashion  Needle gauge: 21 G.  Approach: anterior  Medications administered: 2 mL lidocaine (cardiac); 80 mg methylPREDNISolone acetate 80 MG/ML  Patient tolerance: patient tolerated the procedure well with no immediate complications    Large Joint Arthrocentesis: L glenohumeral  Date/Time: 2/21/2020 12:35 PM  Consent given by: patient  Site marked: site marked  Timeout: Immediately prior to procedure a time out was called to verify the correct patient, procedure, equipment, support staff and site/side marked as required   Supporting Documentation  Indications: pain   Procedure Details  Location: shoulder - L glenohumeral  Preparation: Patient was prepped and draped in the usual sterile fashion  Needle gauge: 21 G.  Approach: anterior  Medications administered: 2 mL lidocaine (cardiac); 80 mg methylPREDNISolone acetate 80 MG/ML  Patient tolerance: patient tolerated the procedure well with no immediate complications

## 2020-02-22 LAB
ERYTHROCYTE [DISTWIDTH] IN BLOOD BY AUTOMATED COUNT: 16.2 % (ref 11.7–15.4)
FERRITIN SERPL-MCNC: 172 NG/ML (ref 15–150)
FOLATE SERPL-MCNC: 5 NG/ML
HCT VFR BLD AUTO: 29.9 % (ref 34–46.6)
HGB BLD-MCNC: 9.3 G/DL (ref 11.1–15.9)
IRON SATN MFR SERPL: 7 % (ref 15–55)
IRON SERPL-MCNC: 18 UG/DL (ref 27–139)
MCH RBC QN AUTO: 24.9 PG (ref 26.6–33)
MCHC RBC AUTO-ENTMCNC: 31.1 G/DL (ref 31.5–35.7)
MCV RBC AUTO: 80 FL (ref 79–97)
PLATELET # BLD AUTO: 554 X10E3/UL (ref 150–450)
RBC # BLD AUTO: 3.73 X10E6/UL (ref 3.77–5.28)
TIBC SERPL-MCNC: 265 UG/DL (ref 250–450)
UIBC SERPL-MCNC: 247 UG/DL (ref 118–369)
VIT B12 SERPL-MCNC: 428 PG/ML (ref 232–1245)
WBC # BLD AUTO: 7.9 X10E3/UL (ref 3.4–10.8)

## 2020-02-24 DIAGNOSIS — D50.9 IRON DEFICIENCY ANEMIA, UNSPECIFIED IRON DEFICIENCY ANEMIA TYPE: Primary | ICD-10-CM

## 2020-02-24 DIAGNOSIS — D72.829 LEUKOCYTOSIS, UNSPECIFIED TYPE: ICD-10-CM

## 2020-03-19 ENCOUNTER — OFFICE VISIT (OUTPATIENT)
Dept: ORTHOPEDIC SURGERY | Facility: CLINIC | Age: 85
End: 2020-03-19

## 2020-03-19 VITALS — BODY MASS INDEX: 29.44 KG/M2 | HEIGHT: 62 IN | WEIGHT: 160 LBS | TEMPERATURE: 98.6 F

## 2020-03-19 DIAGNOSIS — M25.562 PAIN IN BOTH KNEES, UNSPECIFIED CHRONICITY: Primary | ICD-10-CM

## 2020-03-19 DIAGNOSIS — M25.561 PAIN IN BOTH KNEES, UNSPECIFIED CHRONICITY: Primary | ICD-10-CM

## 2020-03-19 PROCEDURE — 73560 X-RAY EXAM OF KNEE 1 OR 2: CPT | Performed by: NURSE PRACTITIONER

## 2020-03-19 PROCEDURE — 20610 DRAIN/INJ JOINT/BURSA W/O US: CPT | Performed by: NURSE PRACTITIONER

## 2020-03-19 RX ORDER — METHYLPREDNISOLONE ACETATE 80 MG/ML
80 INJECTION, SUSPENSION INTRA-ARTICULAR; INTRALESIONAL; INTRAMUSCULAR; SOFT TISSUE
Status: COMPLETED | OUTPATIENT
Start: 2020-03-19 | End: 2020-03-19

## 2020-03-19 RX ADMIN — METHYLPREDNISOLONE ACETATE 80 MG: 80 INJECTION, SUSPENSION INTRA-ARTICULAR; INTRALESIONAL; INTRAMUSCULAR; SOFT TISSUE at 11:36

## 2020-03-19 NOTE — PROGRESS NOTES
3/19/2020    Erum Yang is here today for worsening knee pain. Pt has undergone injection of the knee in the past with good resolution of symptoms. Pt is requesting a repeat injection.     KNEE Injection Procedure Note:    Large Joint Arthrocentesis: R knee  Date/Time: 3/19/2020 11:36 AM  Consent given by: patient  Site marked: site marked  Timeout: Immediately prior to procedure a time out was called to verify the correct patient, procedure, equipment, support staff and site/side marked as required   Supporting Documentation  Indications: pain and joint swelling   Procedure Details  Location: knee - R knee  Preparation: Patient was prepped and draped in the usual sterile fashion  Needle size: 22 G  Approach: anterolateral  Medications administered: 80 mg methylPREDNISolone acetate 80 MG/ML; 2 mL lidocaine (cardiac)  Patient tolerance: patient tolerated the procedure well with no immediate complications    Large Joint Arthrocentesis: L knee  Date/Time: 3/19/2020 11:36 AM  Consent given by: patient  Site marked: site marked  Timeout: Immediately prior to procedure a time out was called to verify the correct patient, procedure, equipment, support staff and site/side marked as required   Supporting Documentation  Indications: pain and joint swelling   Procedure Details  Location: knee - L knee  Preparation: Patient was prepped and draped in the usual sterile fashion  Needle size: 22 G  Approach: anterolateral  Medications administered: 80 mg methylPREDNISolone acetate 80 MG/ML; 2 mL lidocaine (cardiac)  Patient tolerance: patient tolerated the procedure well with no immediate complications          At the conclusion of the injection I discussed the importance of continued quad strengthening exercises on a daily basis. I will see the patient back if the symptoms should fail to improve or worsen.    Kristi Silva, APRN  3/19/2020    3 views bilateral knees were ordered and reviewed today secondary to pain and show  significant bone-on-bone end-stage osteoarthritis.  Compared to views are unchanged

## 2020-04-09 ENCOUNTER — APPOINTMENT (OUTPATIENT)
Dept: LAB | Facility: HOSPITAL | Age: 85
End: 2020-04-09

## 2020-05-22 ENCOUNTER — CLINICAL SUPPORT (OUTPATIENT)
Dept: ORTHOPEDIC SURGERY | Facility: CLINIC | Age: 85
End: 2020-05-22

## 2020-05-22 VITALS — HEIGHT: 62 IN | WEIGHT: 160 LBS | BODY MASS INDEX: 29.44 KG/M2 | TEMPERATURE: 98 F

## 2020-05-22 DIAGNOSIS — M19.012 PRIMARY OSTEOARTHRITIS OF BOTH SHOULDERS: Primary | ICD-10-CM

## 2020-05-22 DIAGNOSIS — M19.011 PRIMARY OSTEOARTHRITIS OF BOTH SHOULDERS: Primary | ICD-10-CM

## 2020-05-22 PROCEDURE — 20610 DRAIN/INJ JOINT/BURSA W/O US: CPT | Performed by: NURSE PRACTITIONER

## 2020-05-22 RX ORDER — METHYLPREDNISOLONE ACETATE 80 MG/ML
80 INJECTION, SUSPENSION INTRA-ARTICULAR; INTRALESIONAL; INTRAMUSCULAR; SOFT TISSUE
Status: COMPLETED | OUTPATIENT
Start: 2020-05-22 | End: 2020-05-22

## 2020-05-22 RX ADMIN — METHYLPREDNISOLONE ACETATE 80 MG: 80 INJECTION, SUSPENSION INTRA-ARTICULAR; INTRALESIONAL; INTRAMUSCULAR; SOFT TISSUE at 13:50

## 2020-05-22 NOTE — PROGRESS NOTES
Ms. Yang comes in today for follow-up.  Injections have worked well in the past.  The patient would like to get a repeat injections today.  The risks, benefits and alternatives were discussed and the patient consented.  Going forward, the patient will follow-up as needed.    RILEY Block    05/22/2020      Large Joint Arthrocentesis: L glenohumeral  Date/Time: 5/22/2020 1:50 PM  Consent given by: patient  Site marked: site marked  Timeout: Immediately prior to procedure a time out was called to verify the correct patient, procedure, equipment, support staff and site/side marked as required   Supporting Documentation  Indications: pain   Procedure Details  Location: shoulder - L glenohumeral  Needle gauge: 21G.  Approach: anterior  Medications administered: 2 mL lidocaine (cardiac); 80 mg methylPREDNISolone acetate 80 MG/ML  Patient tolerance: patient tolerated the procedure well with no immediate complications    Large Joint Arthrocentesis: R glenohumeral  Date/Time: 5/22/2020 1:50 PM  Consent given by: patient  Site marked: site marked  Timeout: Immediately prior to procedure a time out was called to verify the correct patient, procedure, equipment, support staff and site/side marked as required   Supporting Documentation  Indications: pain   Procedure Details  Location: shoulder - R glenohumeral  Preparation: Patient was prepped and draped in the usual sterile fashion  Needle gauge: 21G.  Approach: anterior  Medications administered: 2 mL lidocaine (cardiac); 80 mg methylPREDNISolone acetate 80 MG/ML  Patient tolerance: patient tolerated the procedure well with no immediate complications

## 2020-06-01 ENCOUNTER — CONSULT (OUTPATIENT)
Dept: ONCOLOGY | Facility: CLINIC | Age: 85
End: 2020-06-01

## 2020-06-01 ENCOUNTER — LAB (OUTPATIENT)
Dept: LAB | Facility: HOSPITAL | Age: 85
End: 2020-06-01

## 2020-06-01 VITALS
BODY MASS INDEX: 27.6 KG/M2 | HEIGHT: 62 IN | HEART RATE: 78 BPM | TEMPERATURE: 97.6 F | DIASTOLIC BLOOD PRESSURE: 78 MMHG | WEIGHT: 150 LBS | OXYGEN SATURATION: 96 % | SYSTOLIC BLOOD PRESSURE: 129 MMHG | RESPIRATION RATE: 18 BRPM

## 2020-06-01 DIAGNOSIS — M15.9 OSTEOARTHRITIS OF MULTIPLE JOINTS, UNSPECIFIED OSTEOARTHRITIS TYPE: Primary | ICD-10-CM

## 2020-06-01 DIAGNOSIS — D50.0 IRON DEFICIENCY ANEMIA DUE TO CHRONIC BLOOD LOSS: Primary | ICD-10-CM

## 2020-06-01 DIAGNOSIS — D64.9 ANEMIA, UNSPECIFIED TYPE: ICD-10-CM

## 2020-06-01 LAB
BASOPHILS # BLD AUTO: 0.05 10*3/MM3 (ref 0–0.2)
BASOPHILS NFR BLD AUTO: 0.4 % (ref 0–1.5)
DEPRECATED RDW RBC AUTO: 53.9 FL (ref 37–54)
EOSINOPHIL # BLD AUTO: 0.13 10*3/MM3 (ref 0–0.4)
EOSINOPHIL NFR BLD AUTO: 1.1 % (ref 0.3–6.2)
ERYTHROCYTE [DISTWIDTH] IN BLOOD BY AUTOMATED COUNT: 19.5 % (ref 12.3–15.4)
HCT VFR BLD AUTO: 37.3 % (ref 34–46.6)
HGB BLD-MCNC: 11.5 G/DL (ref 12–15.9)
IMM GRANULOCYTES # BLD AUTO: 0.05 10*3/MM3 (ref 0–0.05)
IMM GRANULOCYTES NFR BLD AUTO: 0.4 % (ref 0–0.5)
LYMPHOCYTES # BLD AUTO: 1.66 10*3/MM3 (ref 0.7–3.1)
LYMPHOCYTES NFR BLD AUTO: 14.2 % (ref 19.6–45.3)
MCH RBC QN AUTO: 24.9 PG (ref 26.6–33)
MCHC RBC AUTO-ENTMCNC: 30.8 G/DL (ref 31.5–35.7)
MCV RBC AUTO: 80.7 FL (ref 79–97)
MONOCYTES # BLD AUTO: 0.82 10*3/MM3 (ref 0.1–0.9)
MONOCYTES NFR BLD AUTO: 7 % (ref 5–12)
NEUTROPHILS # BLD AUTO: 8.98 10*3/MM3 (ref 1.7–7)
NEUTROPHILS NFR BLD AUTO: 76.9 % (ref 42.7–76)
NRBC BLD AUTO-RTO: 0 /100 WBC (ref 0–0.2)
PLATELET # BLD AUTO: 242 10*3/MM3 (ref 140–450)
PMV BLD AUTO: 10.7 FL (ref 6–12)
RBC # BLD AUTO: 4.62 10*6/MM3 (ref 3.77–5.28)
WBC NRBC COR # BLD: 11.69 10*3/MM3 (ref 3.4–10.8)

## 2020-06-01 PROCEDURE — 82728 ASSAY OF FERRITIN: CPT | Performed by: INTERNAL MEDICINE

## 2020-06-01 PROCEDURE — 36415 COLL VENOUS BLD VENIPUNCTURE: CPT

## 2020-06-01 PROCEDURE — 83010 ASSAY OF HAPTOGLOBIN QUANT: CPT | Performed by: INTERNAL MEDICINE

## 2020-06-01 PROCEDURE — 83615 LACTATE (LD) (LDH) ENZYME: CPT | Performed by: INTERNAL MEDICINE

## 2020-06-01 PROCEDURE — 83540 ASSAY OF IRON: CPT | Performed by: INTERNAL MEDICINE

## 2020-06-01 PROCEDURE — 82607 VITAMIN B-12: CPT | Performed by: INTERNAL MEDICINE

## 2020-06-01 PROCEDURE — 85025 COMPLETE CBC W/AUTO DIFF WBC: CPT

## 2020-06-01 PROCEDURE — 80053 COMPREHEN METABOLIC PANEL: CPT | Performed by: INTERNAL MEDICINE

## 2020-06-01 PROCEDURE — 99204 OFFICE O/P NEW MOD 45 MIN: CPT | Performed by: INTERNAL MEDICINE

## 2020-06-01 PROCEDURE — 84466 ASSAY OF TRANSFERRIN: CPT | Performed by: INTERNAL MEDICINE

## 2020-06-01 PROCEDURE — 82746 ASSAY OF FOLIC ACID SERUM: CPT | Performed by: INTERNAL MEDICINE
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
## 2020-06-02 LAB
ALBUMIN SERPL-MCNC: 3.8 G/DL (ref 3.5–5.2)
ALBUMIN/GLOB SERPL: 1.1 G/DL (ref 1.1–2.4)
ALP SERPL-CCNC: 122 U/L (ref 38–116)
ALT SERPL W P-5'-P-CCNC: 11 U/L (ref 0–33)
ANION GAP SERPL CALCULATED.3IONS-SCNC: 13.3 MMOL/L (ref 5–15)
AST SERPL-CCNC: 14 U/L (ref 0–32)
BILIRUB SERPL-MCNC: 0.5 MG/DL (ref 0.2–1.2)
BUN BLD-MCNC: 14 MG/DL (ref 6–20)
BUN/CREAT SERPL: 29.2 (ref 7.3–30)
CALCIUM SPEC-SCNC: 9.1 MG/DL (ref 8.5–10.2)
CHLORIDE SERPL-SCNC: 99 MMOL/L (ref 98–107)
CO2 SERPL-SCNC: 26.7 MMOL/L (ref 22–29)
CREAT BLD-MCNC: 0.48 MG/DL (ref 0.6–1.1)
FERRITIN SERPL-MCNC: 207.3 NG/ML (ref 13–150)
FOLATE SERPL-MCNC: 8.53 NG/ML (ref 4.78–24.2)
GFR SERPL CREATININE-BSD FRML MDRD: 123 ML/MIN/1.73
GLOBULIN UR ELPH-MCNC: 3.4 GM/DL (ref 1.8–3.5)
GLUCOSE BLD-MCNC: 97 MG/DL (ref 74–124)
HAPTOGLOB SERPL-MCNC: 317 MG/DL (ref 30–200)
IRON 24H UR-MRATE: 22 MCG/DL (ref 37–145)
IRON SATN MFR SERPL: 7 % (ref 14–48)
LDH SERPL-CCNC: 138 U/L (ref 99–259)
POTASSIUM BLD-SCNC: 4.2 MMOL/L (ref 3.5–4.7)
PROT SERPL-MCNC: 7.2 G/DL (ref 6.3–8)
SODIUM BLD-SCNC: 139 MMOL/L (ref 134–145)
TIBC SERPL-MCNC: 302 MCG/DL (ref 249–505)
TRANSFERRIN SERPL-MCNC: 216 MG/DL (ref 200–360)
VIT B12 BLD-MCNC: 486 PG/ML (ref 211–946)

## 2020-06-02 RX ORDER — FERROUS SULFATE 325(65) MG
325 TABLET ORAL
Qty: 30 TABLET | Refills: 1 | Status: SHIPPED | OUTPATIENT
Start: 2020-06-02 | End: 2020-01-01

## 2020-06-04 LAB
Lab: NORMAL
METHYLMALONATE SERPL-SCNC: 134 NMOL/L (ref 0–378)
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
## 2020-07-13 ENCOUNTER — OFFICE VISIT (OUTPATIENT)
Dept: ONCOLOGY | Facility: CLINIC | Age: 85
End: 2020-07-13

## 2020-07-13 ENCOUNTER — APPOINTMENT (OUTPATIENT)
Dept: LAB | Facility: HOSPITAL | Age: 85
End: 2020-07-13

## 2020-07-13 VITALS
HEART RATE: 87 BPM | OXYGEN SATURATION: 93 % | RESPIRATION RATE: 18 BRPM | TEMPERATURE: 98.4 F | BODY MASS INDEX: 27.43 KG/M2 | SYSTOLIC BLOOD PRESSURE: 123 MMHG | HEIGHT: 62 IN | DIASTOLIC BLOOD PRESSURE: 73 MMHG

## 2020-07-13 DIAGNOSIS — D50.0 IRON DEFICIENCY ANEMIA DUE TO CHRONIC BLOOD LOSS: Primary | ICD-10-CM

## 2020-07-13 LAB
ALBUMIN SERPL-MCNC: 3.3 G/DL (ref 3.5–5.2)
ALBUMIN/GLOB SERPL: 0.9 G/DL (ref 1.1–2.4)
ALP SERPL-CCNC: 114 U/L (ref 38–116)
ALT SERPL W P-5'-P-CCNC: 10 U/L (ref 0–33)
ANION GAP SERPL CALCULATED.3IONS-SCNC: 12.6 MMOL/L (ref 5–15)
AST SERPL-CCNC: 14 U/L (ref 0–32)
BASOPHILS # BLD AUTO: 0.05 10*3/MM3 (ref 0–0.2)
BASOPHILS NFR BLD AUTO: 0.6 % (ref 0–1.5)
BILIRUB SERPL-MCNC: 0.4 MG/DL (ref 0.2–1.2)
BUN SERPL-MCNC: 13 MG/DL (ref 6–20)
BUN/CREAT SERPL: 27.1 (ref 7.3–30)
CALCIUM SPEC-SCNC: 9.3 MG/DL (ref 8.5–10.2)
CHLORIDE SERPL-SCNC: 97 MMOL/L (ref 98–107)
CO2 SERPL-SCNC: 26.4 MMOL/L (ref 22–29)
CREAT SERPL-MCNC: 0.48 MG/DL (ref 0.6–1.1)
DEPRECATED RDW RBC AUTO: 53.3 FL (ref 37–54)
EOSINOPHIL # BLD AUTO: 0.1 10*3/MM3 (ref 0–0.4)
EOSINOPHIL NFR BLD AUTO: 1.2 % (ref 0.3–6.2)
ERYTHROCYTE [DISTWIDTH] IN BLOOD BY AUTOMATED COUNT: 18.4 % (ref 12.3–15.4)
FERRITIN SERPL-MCNC: 275.8 NG/ML (ref 13–150)
GFR SERPL CREATININE-BSD FRML MDRD: 123 ML/MIN/1.73
GLOBULIN UR ELPH-MCNC: 3.7 GM/DL (ref 1.8–3.5)
GLUCOSE SERPL-MCNC: 103 MG/DL (ref 74–124)
HCT VFR BLD AUTO: 32.2 % (ref 34–46.6)
HGB BLD-MCNC: 9.7 G/DL (ref 12–15.9)
IMM GRANULOCYTES # BLD AUTO: 0.03 10*3/MM3 (ref 0–0.05)
IMM GRANULOCYTES NFR BLD AUTO: 0.4 % (ref 0–0.5)
IRON 24H UR-MRATE: 16 MCG/DL (ref 37–145)
IRON SATN MFR SERPL: 7 % (ref 14–48)
LYMPHOCYTES # BLD AUTO: 1.08 10*3/MM3 (ref 0.7–3.1)
LYMPHOCYTES NFR BLD AUTO: 13.1 % (ref 19.6–45.3)
MCH RBC QN AUTO: 24.2 PG (ref 26.6–33)
MCHC RBC AUTO-ENTMCNC: 30.1 G/DL (ref 31.5–35.7)
MCV RBC AUTO: 80.3 FL (ref 79–97)
MONOCYTES # BLD AUTO: 0.53 10*3/MM3 (ref 0.1–0.9)
MONOCYTES NFR BLD AUTO: 6.4 % (ref 5–12)
NEUTROPHILS NFR BLD AUTO: 6.47 10*3/MM3 (ref 1.7–7)
NEUTROPHILS NFR BLD AUTO: 78.3 % (ref 42.7–76)
NRBC BLD AUTO-RTO: 0 /100 WBC (ref 0–0.2)
PLATELET # BLD AUTO: 453 10*3/MM3 (ref 140–450)
PMV BLD AUTO: 8.7 FL (ref 6–12)
POTASSIUM SERPL-SCNC: 4.1 MMOL/L (ref 3.5–4.7)
PROT SERPL-MCNC: 7 G/DL (ref 6.3–8)
RBC # BLD AUTO: 4.01 10*6/MM3 (ref 3.77–5.28)
SODIUM SERPL-SCNC: 136 MMOL/L (ref 134–145)
TIBC SERPL-MCNC: 238 MCG/DL (ref 249–505)
TRANSFERRIN SERPL-MCNC: 170 MG/DL (ref 200–360)
WBC # BLD AUTO: 8.26 10*3/MM3 (ref 3.4–10.8)

## 2020-07-13 PROCEDURE — 85025 COMPLETE CBC W/AUTO DIFF WBC: CPT | Performed by: INTERNAL MEDICINE

## 2020-07-13 PROCEDURE — 83540 ASSAY OF IRON: CPT | Performed by: INTERNAL MEDICINE

## 2020-07-13 PROCEDURE — 82728 ASSAY OF FERRITIN: CPT | Performed by: INTERNAL MEDICINE

## 2020-07-13 PROCEDURE — 99214 OFFICE O/P EST MOD 30 MIN: CPT | Performed by: INTERNAL MEDICINE

## 2020-07-13 PROCEDURE — 84466 ASSAY OF TRANSFERRIN: CPT | Performed by: INTERNAL MEDICINE

## 2020-07-13 PROCEDURE — 36415 COLL VENOUS BLD VENIPUNCTURE: CPT | Performed by: INTERNAL MEDICINE

## 2020-07-13 PROCEDURE — 80053 COMPREHEN METABOLIC PANEL: CPT | Performed by: INTERNAL MEDICINE

## 2020-07-15 PROBLEM — K90.9 IRON MALABSORPTION: Status: ACTIVE | Noted: 2020-07-15

## 2020-07-15 RX ORDER — SODIUM CHLORIDE 9 MG/ML
250 INJECTION, SOLUTION INTRAVENOUS ONCE
Status: CANCELLED | OUTPATIENT
Start: 2020-01-01

## 2020-07-15 RX ORDER — PROCHLORPERAZINE MALEATE 10 MG
10 TABLET ORAL ONCE
Status: CANCELLED
Start: 2020-01-01

## 2020-07-16 ENCOUNTER — OFFICE VISIT (OUTPATIENT)
Dept: ORTHOPEDIC SURGERY | Facility: CLINIC | Age: 85
End: 2020-07-16

## 2020-07-16 VITALS — HEIGHT: 62 IN | BODY MASS INDEX: 27.6 KG/M2 | WEIGHT: 150 LBS | TEMPERATURE: 98.3 F

## 2020-07-16 DIAGNOSIS — M17.0 PRIMARY OSTEOARTHRITIS OF BOTH KNEES: Primary | ICD-10-CM

## 2020-07-16 PROCEDURE — 99213 OFFICE O/P EST LOW 20 MIN: CPT | Performed by: NURSE PRACTITIONER

## 2020-07-16 PROCEDURE — 20610 DRAIN/INJ JOINT/BURSA W/O US: CPT | Performed by: NURSE PRACTITIONER

## 2020-07-16 RX ORDER — TRIAMCINOLONE ACETONIDE 40 MG/ML
80 INJECTION, SUSPENSION INTRA-ARTICULAR; INTRAMUSCULAR
Status: COMPLETED | OUTPATIENT
Start: 2020-07-16 | End: 2020-07-16

## 2020-07-16 RX ADMIN — TRIAMCINOLONE ACETONIDE 80 MG: 40 INJECTION, SUSPENSION INTRA-ARTICULAR; INTRAMUSCULAR at 11:05

## 2020-07-16 NOTE — PROGRESS NOTES
"Patient: Erum Yang  YOB: 1933 86 y.o. female  Medical Record Number: [id number]    Chief Complaints:   Chief Complaint   Patient presents with   • Right Knee - Follow-up, Pain   • Left Knee - Follow-up, Pain       History of Present Illness:Erum Yang is a 86 y.o. female who presents with complaints of bilateral knee pain.  The patient reports she has had increased pain over the last several months, denies any injury.  Describes the knee pain as a severe constant ache worse with any standing walking, only slightly better with rest.    Allergies:   Allergies   Allergen Reactions   • Advil [Ibuprofen] Swelling   • Tylenol [Acetaminophen] Swelling       Medications:   Current Outpatient Medications   Medication Sig Dispense Refill   • ferrous sulfate 325 (65 FE) MG tablet Take 1 tablet by mouth Daily With Breakfast. 30 tablet 1     No current facility-administered medications for this visit.          The following portions of the patient's history were reviewed and updated as appropriate: allergies, current medications, past family history, past medical history, past social history, past surgical history and problem list.    Review of Systems:   A 14 point review of systems was performed. All systems negative except pertinent positives/negative listed in HPI above    Physical Exam:   Vitals:    07/16/20 1104   Temp: 98.3 °F (36.8 °C)   Weight: 68 kg (150 lb)   Height: 157.5 cm (62\")   PainSc: 10-Worst pain ever       General: A and O x 3, ASA, NAD    SCLERA:    Normal    DENTITION:   Normal  Skin clear no unusual lesions noted  Bilateral knees patient has no appreciable effusion limited range of motion secondary to pain calf soft and nontender    Radiology:  Xrays previous x-rays were reviewed show bone-on-bone end-stage osteoarthritis.    Assessment/Plan:  End-stage osteoarthritis bilateral knees    Patient discussed treatment options, she would like to proceed with bilateral knee cortisone " injections.  Prior to injections risks were discussed including pain.  Patient verbalized understanding would like to proceed with injections, she will continue with physical therapy exercises and we will see her back as needed    Large Joint Arthrocentesis: R knee  Date/Time: 7/16/2020 11:05 AM  Consent given by: patient  Site marked: site marked  Timeout: Immediately prior to procedure a time out was called to verify the correct patient, procedure, equipment, support staff and site/side marked as required   Supporting Documentation  Indications: pain   Procedure Details  Location: knee - R knee  Preparation: Patient was prepped and draped in the usual sterile fashion  Needle gauge: 21g.  Approach: lateral  Medications administered: 2 mL lidocaine (cardiac); 80 mg triamcinolone acetonide 40 MG/ML  Patient tolerance: patient tolerated the procedure well with no immediate complications    Large Joint Arthrocentesis: L knee  Date/Time: 7/16/2020 11:05 AM  Consent given by: patient  Site marked: site marked  Timeout: Immediately prior to procedure a time out was called to verify the correct patient, procedure, equipment, support staff and site/side marked as required   Supporting Documentation  Indications: pain   Procedure Details  Location: knee - L knee  Preparation: Patient was prepped and draped in the usual sterile fashion  Needle gauge: 21g.  Approach: lateral  Medications administered: 2 mL lidocaine (cardiac); 80 mg triamcinolone acetonide 40 MG/ML  Patient tolerance: patient tolerated the procedure well with no immediate complications
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
## 2020-09-18 NOTE — TELEPHONE ENCOUNTER
PT CALLING TO LET DR. BRAY KNOW SHE IS FEELING BETTER IN REGARDS TO HER BACK PAIN. WILL MESSAGE DR. BRAY TO MAKE HIM AWARE. PT V/U.

## 2020-09-18 NOTE — TELEPHONE ENCOUNTER
Pt's wanting to speak with Dr. Parrish about her back injury whenever he's available     Her phone# is [phone]

## 2020-10-20 NOTE — PROGRESS NOTES
Medicare Subsequent Wellness Visit  Subjective   History of Present Illness    Erum Yang is a 86 y.o. female who presents for an Medicare Wellness Visit. In addition, we addressed the following health issues:    Anemia, iron deficiency due to chronic blood loss. Patient reports that she had an iron infusion at hematology. Patient reports that she is doing well and denies any abnormal bleeding or bruising and fatigue.       PMH, PSH, SocHx, FamHx, Allergies, and Medications: Reviewed and updated in the history section of chart.  Family History   Problem Relation Age of Onset   • Breast cancer Mother    • Heart attack Father    • Heart disease Father    • Heart disease Other    • Hypertension Other    • Uterine cancer Other    • Lung cancer Sister    • Stroke Maternal Aunt    • Breast cancer Maternal Aunt    • Cancer Sister         abdomen   • Malig Hyperthermia Neg Hx        Social History     Social History Narrative    Lives at home with sister.        Allergies   Allergen Reactions   • Advil [Ibuprofen] Swelling   • Tylenol [Acetaminophen] Swelling       No outpatient medications prior to visit.     No facility-administered medications prior to visit.         Patient Active Problem List   Diagnosis   • Osteoarthritis of multiple joints   • Hx of hepatitis   • Venous insufficiency of right leg   • Primary osteoarthritis of right hip   • Status post right hip replacement   • Iron deficiency anemia due to chronic blood loss   • Iron malabsorption         Patient Care Team:  Hank Saunders MD as PCP - General (Family Medicine)  Hank Saunders MD as PCP - [facility]  Shawn Parrish MD as Consulting Physician (Hematology and Oncology)  Hank Saunders MD as Referring Physician (Family Medicine)  Health Habits:  Current Diet: Well balanced diet  Tobacco use.  Pack years:  Dental Exam. Up to date  Eye Exam. Needs to schedule  Exercise: Daily   Current exercise activities include: Hands, arms  and legs.     Recent Hospitalizations:  none    Age-appropriate Screening Schedule:  Refer to the list below for future screening recommendations based on patient's age. Orders for these recommended tests are listed in the plan section. The patient has been provided with a written plan.      Health Maintenance   Topic Date Due   • TDAP/TD VACCINES (1 - Tdap) 12/12/1952   • ZOSTER VACCINE (1 of 2) 12/12/1983   • ANNUAL WELLNESS VISIT  10/20/2021   • Pneumococcal Vaccine 65+ (1 of 1 - PPSV23) 12/20/2024   • INFLUENZA VACCINE  Completed       Depression Screen:   PHQ-2/PHQ-9 Depression Screening 10/20/2020   Little interest or pleasure in doing things 0   Feeling down, depressed, or hopeless 0   Trouble falling or staying asleep, or sleeping too much -   Feeling tired or having little energy -   Poor appetite or overeating -   Feeling bad about yourself - or that you are a failure or have let yourself or your family down -   Trouble concentrating on things, such as reading the newspaper or watching television -   Moving or speaking so slowly that other people could have noticed. Or the opposite - being so fidgety or restless that you have been moving around a lot more than usual -   Thoughts that you would be better off dead, or of hurting yourself in some way -   Total Score 0       Functional and Cognitive Screening:  Functional & Cognitive Status 10/20/2020   Do you have difficulty preparing food and eating? Yes   Do you have difficulty bathing yourself, getting dressed or grooming yourself? No   Do you have difficulty using the toilet? No   Do you have difficulty moving around from place to place? Yes   Do you have trouble with steps or getting out of a bed or a chair? Yes   Current Diet Well Balanced Diet   Dental Exam Up to date   Eye Exam Not up to date   Exercise (times per week) 0 times per week   Current Exercise Activities Include None   Do you need help using the phone?  No   Are you deaf or do you have  serious difficulty hearing?  No   Do you need help with transportation? Yes   Do you need help shopping? Yes   Do you need help preparing meals?  Yes   Do you need help with housework?  Yes   Do you need help with laundry? Yes   Do you need help taking your medications? No   Do you need help managing money? No   Do you ever drive or ride in a car without wearing a seat belt? No   Have you felt unusual stress, anger or loneliness in the last month? No   Who do you live with? Sibling   If you need help, do you have trouble finding someone available to you? No   Have you been bothered in the last four weeks by sexual problems? No   Do you have difficulty concentrating, remembering or making decisions? No     Does the patient have evidence of cognitive impairment? No    Compared to one year ago, the patient feels their physical health and mental health are the same.       Review of Systems   Constitutional: Negative.  Negative for appetite change, chills, fatigue, fever and unexpected weight change.   HENT: Positive for rhinorrhea. Negative for congestion, dental problem, postnasal drip and sore throat.         Dental exam is up to date.    Eyes: Negative.  Negative for photophobia and visual disturbance.        Eye exam is due.    Respiratory: Positive for shortness of breath (with exertion). Negative for chest tightness and wheezing.    Cardiovascular: Negative.  Negative for chest pain, palpitations and leg swelling.   Gastrointestinal: Negative.  Negative for abdominal pain, constipation, diarrhea, nausea and vomiting.   Endocrine: Negative.  Negative for cold intolerance, heat intolerance, polydipsia, polyphagia and polyuria.   Genitourinary: Negative.  Negative for difficulty urinating, dysuria, frequency, pelvic pain, urgency, vaginal bleeding and vaginal discharge.   Musculoskeletal: Positive for arthralgias (knees and shoulders). Negative for back pain, gait problem and myalgias.   Skin: Negative.   "  Allergic/Immunologic: Negative.  Negative for environmental allergies and food allergies.   Neurological: Negative.  Negative for dizziness, weakness, numbness and headaches.   Hematological: Negative.  Does not bruise/bleed easily.   Psychiatric/Behavioral: Negative.  Negative for dysphoric mood and sleep disturbance. The patient is not nervous/anxious.        Objective     Vitals:    10/20/20 1347   BP: 152/72   Pulse: 81   Resp: 14   SpO2: 98%   Weight: 68 kg (150 lb)   Height: 157.5 cm (62\")       Body mass index is 27.44 kg/m².    PHYSICAL EXAM  Vitals reviewed and on chart.  HEENT: PERRLA, EOMI. Oral mucosa moist,   No LAD.  CV: RRR,  Murmur noted. No rubs, clicks or gallops  LUNGS: CTA bilaterally  MUSC: Severe kyphosis noted, patient uses walker to ambulate.  EXT: 2+ pitting pedal edema bilaterally, FROM in bilateral upper and lower ext  NEURO: CN II - XII grossly intact  PSYCH: good mood, positive affect, alert and engaged. No thoughts of self harm  expressed.    ASSESSMENT AND PLAN  Pap smear : Discontinued.   Mammogram: Discontinued  Colon cancer screening : Discontinued  Lung cancer screening : CXR 8/15/2019  Bone Density :      Problem List Items Addressed This Visit        Hematopoietic and Hemostatic    Iron deficiency anemia due to chronic blood loss    Relevant Orders    CBC & Differential    Comprehensive Metabolic Panel    Iron and TIBC    Ferritin      Other Visit Diagnoses     Medicare annual wellness visit, subsequent    -  Primary    Relevant Orders    CBC & Differential    Comprehensive Metabolic Panel    Fluad Quad 65+ yrs (9701-0085) (Completed)    Lipid Panel With / Chol / HDL Ratio    Screening for hyperlipidemia        Relevant Orders    Lipid Panel With / Chol / HDL Ratio    Need for vaccination        Relevant Orders    Fluad Quad 65+ yrs (5165-2559) (Completed)          Orders:  Orders Placed This Encounter   Procedures   • Fluad Quad 65+ yrs (2535-4671)   • Comprehensive Metabolic " Panel   • Lipid Panel With / Chol / HDL Ratio   • Iron and TIBC   • Ferritin   • CBC & Differential       Follow Up:     ADVANCED DIRECTIVES:   ACP discussion was held with the patient during this visit. Patient has an advance directive (not in EMR), copy requested.    An After Visit Summary and PPPS with all of these plans were given to the patient.      Preventative counseling regarding healthy diet and exercise.   Pt reports that he wears a seatbelt regularly.  Follow-up in 1 year for medicare wellness visit and labs.         Patient Instructions     I will call you with your lab results.   Please call with any questions or concerns.   Follow-up in 1 year for medicare wellness visit and labs.   Medicare Wellness  Personal Prevention Plan of Service     Date of Office Visit:  10/20/2020  Encounter Provider:  RILEY Ocampo  Place of Service:  [facility]  Patient Name: Erum Yang  :  1933    As part of the Medicare Wellness portion of your visit today, we are providing you with this personalized preventive plan of services (PPPS). This plan is based upon recommendations of the [place] Preventive Services Task Force (USPSTF) and the Advisory Committee on Immunization Practices (ACIP).    This lists the preventive care services that should be considered, and provides dates of when you are due. Items listed as completed are up-to-date and do not require any further intervention.    Health Maintenance   Topic Date Due   • TDAP/TD VACCINES (1 - Tdap) 1952   • ZOSTER VACCINE (1 of 2) 1983   • INFLUENZA VACCINE  2020   • ANNUAL WELLNESS VISIT  10/20/2021   • Pneumococcal Vaccine 65+ (1 of 1 - PPSV23) 2024       Orders Placed This Encounter   Procedures   • Fluad Quad 65+ yrs (7073-8110)   • Comprehensive Metabolic Panel   • Lipid Panel With / Chol / HDL Ratio   • Iron and TIBC   • Ferritin   • CBC & Differential       No follow-ups on  file.

## 2020-10-20 NOTE — PATIENT INSTRUCTIONS
I will call you with your lab results.   Please call with any questions or concerns.   Follow-up in 1 year for medicare wellness visit and labs.   Medicare Wellness  Personal Prevention Plan of Service     Date of Office Visit:  10/20/2020  Encounter Provider:  RILEY Ocampo  Place of Service:  [facility]  Patient Name: Erum Yang  :  1933    As part of the Medicare Wellness portion of your visit today, we are providing you with this personalized preventive plan of services (PPPS). This plan is based upon recommendations of the [place] Preventive Services Task Force (USPSTF) and the Advisory Committee on Immunization Practices (ACIP).    This lists the preventive care services that should be considered, and provides dates of when you are due. Items listed as completed are up-to-date and do not require any further intervention.    Health Maintenance   Topic Date Due   • TDAP/TD VACCINES (1 - Tdap) 1952   • ZOSTER VACCINE (1 of 2) 1983   • INFLUENZA VACCINE  2020   • ANNUAL WELLNESS VISIT  10/20/2021   • Pneumococcal Vaccine 65+ (1 of 1 - PPSV23) 2024       Orders Placed This Encounter   Procedures   • Fluad Quad 65+ yrs (2802-4971)   • Comprehensive Metabolic Panel   • Lipid Panel With / Chol / HDL Ratio   • Iron and TIBC   • Ferritin   • CBC & Differential       No follow-ups on file.

## 2021-01-01 ENCOUNTER — APPOINTMENT (OUTPATIENT)
Dept: CARDIOLOGY | Facility: HOSPITAL | Age: 86
End: 2021-01-01

## 2021-01-01 ENCOUNTER — OFFICE VISIT (OUTPATIENT)
Dept: ONCOLOGY | Facility: CLINIC | Age: 86
End: 2021-01-01

## 2021-01-01 ENCOUNTER — APPOINTMENT (OUTPATIENT)
Dept: GENERAL RADIOLOGY | Facility: HOSPITAL | Age: 86
End: 2021-01-01

## 2021-01-01 ENCOUNTER — LAB (OUTPATIENT)
Dept: LAB | Facility: HOSPITAL | Age: 86
End: 2021-01-01

## 2021-01-01 ENCOUNTER — HOSPITAL ENCOUNTER (OUTPATIENT)
Dept: CARDIOLOGY | Facility: HOSPITAL | Age: 86
Discharge: HOME | End: 2021-03-18
Admitting: INTERNAL MEDICINE

## 2021-01-01 ENCOUNTER — APPOINTMENT (OUTPATIENT)
Dept: CT IMAGING | Facility: HOSPITAL | Age: 86
End: 2021-01-01

## 2021-01-01 ENCOUNTER — IMMUNIZATION (OUTPATIENT)
Dept: VACCINE CLINIC | Facility: HOSPITAL | Age: 86
End: 2021-01-01

## 2021-01-01 ENCOUNTER — HOSPITAL ENCOUNTER (INPATIENT)
Facility: HOSPITAL | Age: 86
LOS: 9 days | End: 2021-07-19
Attending: EMERGENCY MEDICINE | Admitting: INTERNAL MEDICINE

## 2021-01-01 ENCOUNTER — APPOINTMENT (OUTPATIENT)
Dept: LAB | Facility: HOSPITAL | Age: 86
End: 2021-01-01

## 2021-01-01 ENCOUNTER — APPOINTMENT (OUTPATIENT)
Dept: MRI IMAGING | Facility: HOSPITAL | Age: 86
End: 2021-01-01

## 2021-01-01 ENCOUNTER — APPOINTMENT (OUTPATIENT)
Dept: ONCOLOGY | Facility: HOSPITAL | Age: 86
End: 2021-01-01

## 2021-01-01 VITALS
WEIGHT: 159.39 LBS | OXYGEN SATURATION: 99 % | DIASTOLIC BLOOD PRESSURE: 56 MMHG | SYSTOLIC BLOOD PRESSURE: 99 MMHG | TEMPERATURE: 98.6 F | HEIGHT: 67 IN | BODY MASS INDEX: 25.02 KG/M2

## 2021-01-01 VITALS
DIASTOLIC BLOOD PRESSURE: 80 MMHG | HEART RATE: 72 BPM | HEIGHT: 62 IN | BODY MASS INDEX: 27.43 KG/M2 | OXYGEN SATURATION: 96 % | TEMPERATURE: 97.5 F | RESPIRATION RATE: 18 BRPM | SYSTOLIC BLOOD PRESSURE: 147 MMHG

## 2021-01-01 DIAGNOSIS — R60.0 LOCALIZED EDEMA: ICD-10-CM

## 2021-01-01 DIAGNOSIS — Z23 IMMUNIZATION DUE: ICD-10-CM

## 2021-01-01 DIAGNOSIS — I87.2 VENOUS INSUFFICIENCY OF RIGHT LEG: ICD-10-CM

## 2021-01-01 DIAGNOSIS — D50.0 IRON DEFICIENCY ANEMIA DUE TO CHRONIC BLOOD LOSS: Primary | ICD-10-CM

## 2021-01-01 DIAGNOSIS — E87.70 HYPERVOLEMIA, UNSPECIFIED HYPERVOLEMIA TYPE: ICD-10-CM

## 2021-01-01 DIAGNOSIS — M15.9 OSTEOARTHRITIS OF MULTIPLE JOINTS, UNSPECIFIED OSTEOARTHRITIS TYPE: ICD-10-CM
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
## 2021-01-01 DIAGNOSIS — D50.0 IRON DEFICIENCY ANEMIA DUE TO CHRONIC BLOOD LOSS: ICD-10-CM

## 2021-01-01 DIAGNOSIS — R77.8 ELEVATED TROPONIN: ICD-10-CM

## 2021-01-01 DIAGNOSIS — I50.9 ACUTE CONGESTIVE HEART FAILURE, UNSPECIFIED HEART FAILURE TYPE (HCC): ICD-10-CM

## 2021-01-01 DIAGNOSIS — E87.1 HYPONATREMIA: Primary | ICD-10-CM

## 2021-01-01 LAB
ALBUMIN SERPL-MCNC: 2.9 G/DL (ref 3.5–5.2)
ALBUMIN SERPL-MCNC: 3.4 G/DL (ref 3.5–5.2)
ALBUMIN/GLOB SERPL: 1.1 G/DL
ALBUMIN/GLOB SERPL: 1.1 G/DL
ALP SERPL-CCNC: 115 U/L (ref 39–117)
ALP SERPL-CCNC: 99 U/L (ref 39–117)
ALT SERPL W P-5'-P-CCNC: 26 U/L (ref 1–33)
ALT SERPL W P-5'-P-CCNC: 31 U/L (ref 1–33)
ANION GAP SERPL CALCULATED.3IONS-SCNC: 10.1 MMOL/L (ref 5–15)
ANION GAP SERPL CALCULATED.3IONS-SCNC: 11.2 MMOL/L (ref 5–15)
ANION GAP SERPL CALCULATED.3IONS-SCNC: 12 MMOL/L (ref 5–15)
ANION GAP SERPL CALCULATED.3IONS-SCNC: 12.4 MMOL/L (ref 5–15)
ANION GAP SERPL CALCULATED.3IONS-SCNC: 5.2 MMOL/L (ref 5–15)
ANION GAP SERPL CALCULATED.3IONS-SCNC: 6.9 MMOL/L (ref 5–15)
ANION GAP SERPL CALCULATED.3IONS-SCNC: 8.2 MMOL/L (ref 5–15)
ANION GAP SERPL CALCULATED.3IONS-SCNC: 9.2 MMOL/L (ref 5–15)
ANION GAP SERPL CALCULATED.3IONS-SCNC: 9.9 MMOL/L (ref 5–15)
AORTIC DIMENSIONLESS INDEX: 0.5 (DI)
AST SERPL-CCNC: 27 U/L (ref 1–32)
AST SERPL-CCNC: 33 U/L (ref 1–32)
BACTERIA UR QL AUTO: ABNORMAL /HPF
BASOPHILS # BLD AUTO: 0.03 10*3/MM3 (ref 0–0.2)
BASOPHILS # BLD AUTO: 0.03 10*3/MM3 (ref 0–0.2)
BASOPHILS # BLD AUTO: 0.05 10*3/MM3 (ref 0–0.2)
BASOPHILS NFR BLD AUTO: 0.2 % (ref 0–1.5)
BASOPHILS NFR BLD AUTO: 0.2 % (ref 0–1.5)
BASOPHILS NFR BLD AUTO: 0.6 % (ref 0–1.5)
BH CV ECHO MEAS - AO MAX PG: 13 MMHG
BH CV ECHO MEAS - AO MEAN PG (FULL): 5 MMHG
BH CV ECHO MEAS - AO MEAN PG: 7 MMHG
BH CV ECHO MEAS - AO ROOT AREA (BSA CORRECTED): 1.8
BH CV ECHO MEAS - AO ROOT AREA: 8.6 CM^2
BH CV ECHO MEAS - AO ROOT DIAM: 3.3 CM
BH CV ECHO MEAS - AO V2 MAX: 180.1 CM/SEC
BH CV ECHO MEAS - AO V2 MEAN: 124 CM/SEC
BH CV ECHO MEAS - AO V2 VTI: 27.6 CM
BH CV ECHO MEAS - AVA(I,A): 1.5 CM^2
BH CV ECHO MEAS - AVA(I,D): 1.5 CM^2
BH CV ECHO MEAS - BSA(HAYCOCK): 1.9 M^2
BH CV ECHO MEAS - BSA: 1.9 M^2
BH CV ECHO MEAS - BZI_BMI: 26.3 KILOGRAMS/M^2
BH CV ECHO MEAS - BZI_METRIC_HEIGHT: 170.2 CM
BH CV ECHO MEAS - BZI_METRIC_WEIGHT: 76.2 KG
BH CV ECHO MEAS - EDV(CUBED): 79.5 ML
BH CV ECHO MEAS - EDV(MOD-SP2): 62 ML
BH CV ECHO MEAS - EDV(MOD-SP4): 57 ML
BH CV ECHO MEAS - EDV(TEICH): 83.1 ML
BH CV ECHO MEAS - EF(CUBED): 54.8 %
BH CV ECHO MEAS - EF(MOD-BP): 57 %
BH CV ECHO MEAS - EF(MOD-SP2): 54.8 %
BH CV ECHO MEAS - EF(MOD-SP4): 59.6 %
BH CV ECHO MEAS - EF(TEICH): 46.9 %
BH CV ECHO MEAS - ESV(CUBED): 35.9 ML
BH CV ECHO MEAS - ESV(MOD-SP2): 28 ML
BH CV ECHO MEAS - ESV(MOD-SP4): 23 ML
BH CV ECHO MEAS - ESV(TEICH): 44.1 ML
BH CV ECHO MEAS - FS: 23.3 %
BH CV ECHO MEAS - IVS/LVPW: 1
BH CV ECHO MEAS - IVSD: 1.3 CM
BH CV ECHO MEAS - LAT PEAK E' VEL: 11 CM/SEC
BH CV ECHO MEAS - LV DIASTOLIC VOL/BSA (35-75): 30.4 ML/M^2
BH CV ECHO MEAS - LV MASS(C)D: 207.8 GRAMS
BH CV ECHO MEAS - LV MASS(C)DI: 110.7 GRAMS/M^2
BH CV ECHO MEAS - LV MAX PG: 4.6 MMHG
BH CV ECHO MEAS - LV MEAN PG: 2 MMHG
BH CV ECHO MEAS - LV SYSTOLIC VOL/BSA (12-30): 12.2 ML/M^2
BH CV ECHO MEAS - LV V1 MAX: 106.8 CM/SEC
BH CV ECHO MEAS - LV V1 MEAN: 65.5 CM/SEC
BH CV ECHO MEAS - LV V1 VTI: 12.8 CM
BH CV ECHO MEAS - LVIDD: 4.3 CM
BH CV ECHO MEAS - LVIDS: 3.3 CM
BH CV ECHO MEAS - LVLD AP2: 6.7 CM
BH CV ECHO MEAS - LVLD AP4: 7 CM
BH CV ECHO MEAS - LVLS AP2: 5.7 CM
BH CV ECHO MEAS - LVLS AP4: 6.1 CM
BH CV ECHO MEAS - LVOT AREA (M): 3.1 CM^2
BH CV ECHO MEAS - LVOT AREA: 3.1 CM^2
BH CV ECHO MEAS - LVOT DIAM: 2 CM
BH CV ECHO MEAS - LVPWD: 1.3 CM
BH CV ECHO MEAS - MED PEAK E' VEL: 7.9 CM/SEC
BH CV ECHO MEAS - MV A DUR: 0.06 SEC
BH CV ECHO MEAS - MV A MAX VEL: 56.3 CM/SEC
BH CV ECHO MEAS - MV DEC SLOPE: 583.5 CM/SEC^2
BH CV ECHO MEAS - MV DEC TIME: 127 SEC
BH CV ECHO MEAS - MV E MAX VEL: 92.8 CM/SEC
BH CV ECHO MEAS - MV E/A: 1.6
BH CV ECHO MEAS - MV MEAN PG: 1.5 MMHG
BH CV ECHO MEAS - MV P1/2T MAX VEL: 106 CM/SEC
BH CV ECHO MEAS - MV P1/2T: 53.2 MSEC
BH CV ECHO MEAS - MV V2 MEAN: 56.1 CM/SEC
BH CV ECHO MEAS - MV V2 VTI: 18.4 CM
BH CV ECHO MEAS - MVA P1/2T LCG: 2.1 CM^2
BH CV ECHO MEAS - MVA(P1/2T): 4.1 CM^2
BH CV ECHO MEAS - MVA(VTI): 2.2 CM^2
BH CV ECHO MEAS - PA ACC SLOPE: 888 CM/SEC^2
BH CV ECHO MEAS - PA ACC TIME: 0.11 SEC
BH CV ECHO MEAS - PA MAX PG (FULL): 2.2 MMHG
BH CV ECHO MEAS - PA MAX PG: 4.2 MMHG
BH CV ECHO MEAS - PA PR(ACCEL): 28.2 MMHG
BH CV ECHO MEAS - PA V2 MAX: 102 CM/SEC
BH CV ECHO MEAS - RAP SYSTOLE: 8 MMHG
BH CV ECHO MEAS - RV MAX PG: 2 MMHG
BH CV ECHO MEAS - RV MEAN PG: 1 MMHG
BH CV ECHO MEAS - RV V1 MAX: 70.7 CM/SEC
BH CV ECHO MEAS - RV V1 MEAN: 44.7 CM/SEC
BH CV ECHO MEAS - RV V1 VTI: 7.2 CM
BH CV ECHO MEAS - RVSP: 40.5 MMHG
BH CV ECHO MEAS - SI(AO): 125.7 ML/M^2
BH CV ECHO MEAS - SI(CUBED): 23.2 ML/M^2
BH CV ECHO MEAS - SI(LVOT): 21.4 ML/M^2
BH CV ECHO MEAS - SI(MOD-SP2): 18.1 ML/M^2
BH CV ECHO MEAS - SI(MOD-SP4): 18.1 ML/M^2
BH CV ECHO MEAS - SI(TEICH): 20.7 ML/M^2
BH CV ECHO MEAS - SV(AO): 236.1 ML
BH CV ECHO MEAS - SV(CUBED): 43.6 ML
BH CV ECHO MEAS - SV(LVOT): 40.2 ML
BH CV ECHO MEAS - SV(MOD-SP2): 34 ML
BH CV ECHO MEAS - SV(MOD-SP4): 34 ML
BH CV ECHO MEAS - SV(TEICH): 38.9 ML
BH CV ECHO MEAS - TAPSE (>1.6): 1.8 CM
BH CV ECHO MEAS - TR MAX VEL: 285 CM/SEC
BH CV ECHO MEASUREMENTS AVERAGE E/E' RATIO: 9.82
BH CV LOWER VASCULAR LEFT COMMON FEMORAL AUGMENT: NORMAL
BH CV LOWER VASCULAR LEFT COMMON FEMORAL AUGMENT: NORMAL
BH CV LOWER VASCULAR LEFT COMMON FEMORAL COMPETENT: NORMAL
BH CV LOWER VASCULAR LEFT COMMON FEMORAL COMPETENT: NORMAL
BH CV LOWER VASCULAR LEFT COMMON FEMORAL COMPRESS: NORMAL
BH CV LOWER VASCULAR LEFT COMMON FEMORAL COMPRESS: NORMAL
BH CV LOWER VASCULAR LEFT COMMON FEMORAL PHASIC: NORMAL
BH CV LOWER VASCULAR LEFT COMMON FEMORAL PHASIC: NORMAL
BH CV LOWER VASCULAR LEFT COMMON FEMORAL SPONT: NORMAL
BH CV LOWER VASCULAR LEFT COMMON FEMORAL SPONT: NORMAL
BH CV LOWER VASCULAR LEFT DISTAL FEMORAL COMPRESS: NORMAL
BH CV LOWER VASCULAR LEFT DISTAL FEMORAL COMPRESS: NORMAL
BH CV LOWER VASCULAR LEFT GASTRONEMIUS COMPRESS: NORMAL
BH CV LOWER VASCULAR LEFT GASTRONEMIUS COMPRESS: NORMAL
BH CV LOWER VASCULAR LEFT GREATER SAPH AK COMPRESS: NORMAL
BH CV LOWER VASCULAR LEFT GREATER SAPH AK COMPRESS: NORMAL
BH CV LOWER VASCULAR LEFT GREATER SAPH BK COMPRESS: NORMAL
BH CV LOWER VASCULAR LEFT GREATER SAPH BK COMPRESS: NORMAL
BH CV LOWER VASCULAR LEFT LESSER SAPH COMPRESS: NORMAL
BH CV LOWER VASCULAR LEFT LESSER SAPH COMPRESS: NORMAL
BH CV LOWER VASCULAR LEFT MID FEMORAL AUGMENT: NORMAL
BH CV LOWER VASCULAR LEFT MID FEMORAL AUGMENT: NORMAL
BH CV LOWER VASCULAR LEFT MID FEMORAL COMPETENT: NORMAL
BH CV LOWER VASCULAR LEFT MID FEMORAL COMPETENT: NORMAL
BH CV LOWER VASCULAR LEFT MID FEMORAL COMPRESS: NORMAL
BH CV LOWER VASCULAR LEFT MID FEMORAL COMPRESS: NORMAL
BH CV LOWER VASCULAR LEFT MID FEMORAL PHASIC: NORMAL
BH CV LOWER VASCULAR LEFT MID FEMORAL PHASIC: NORMAL
BH CV LOWER VASCULAR LEFT MID FEMORAL SPONT: NORMAL
BH CV LOWER VASCULAR LEFT MID FEMORAL SPONT: NORMAL
BH CV LOWER VASCULAR LEFT PERONEAL COMPRESS: NORMAL
BH CV LOWER VASCULAR LEFT PERONEAL COMPRESS: NORMAL
BH CV LOWER VASCULAR LEFT POPLITEAL AUGMENT: NORMAL
BH CV LOWER VASCULAR LEFT POPLITEAL AUGMENT: NORMAL
BH CV LOWER VASCULAR LEFT POPLITEAL COMPETENT: NORMAL
BH CV LOWER VASCULAR LEFT POPLITEAL COMPETENT: NORMAL
BH CV LOWER VASCULAR LEFT POPLITEAL COMPRESS: NORMAL
BH CV LOWER VASCULAR LEFT POPLITEAL COMPRESS: NORMAL
BH CV LOWER VASCULAR LEFT POPLITEAL PHASIC: NORMAL
BH CV LOWER VASCULAR LEFT POPLITEAL PHASIC: NORMAL
BH CV LOWER VASCULAR LEFT POPLITEAL SPONT: NORMAL
BH CV LOWER VASCULAR LEFT POPLITEAL SPONT: NORMAL
BH CV LOWER VASCULAR LEFT POSTERIOR TIBIAL COMPRESS: NORMAL
BH CV LOWER VASCULAR LEFT POSTERIOR TIBIAL COMPRESS: NORMAL
BH CV LOWER VASCULAR LEFT PROFUNDA FEMORAL COMPRESS: NORMAL
BH CV LOWER VASCULAR LEFT PROFUNDA FEMORAL COMPRESS: NORMAL
BH CV LOWER VASCULAR LEFT PROXIMAL FEMORAL COMPRESS: NORMAL
BH CV LOWER VASCULAR LEFT PROXIMAL FEMORAL COMPRESS: NORMAL
BH CV LOWER VASCULAR LEFT SAPHENOFEMORAL JUNCTION COMPRESS: NORMAL
BH CV LOWER VASCULAR LEFT SAPHENOFEMORAL JUNCTION COMPRESS: NORMAL
BH CV LOWER VASCULAR RIGHT COMMON FEMORAL AUGMENT: NORMAL
BH CV LOWER VASCULAR RIGHT COMMON FEMORAL AUGMENT: NORMAL
BH CV LOWER VASCULAR RIGHT COMMON FEMORAL COMPETENT: NORMAL
BH CV LOWER VASCULAR RIGHT COMMON FEMORAL COMPETENT: NORMAL
BH CV LOWER VASCULAR RIGHT COMMON FEMORAL COMPRESS: NORMAL
BH CV LOWER VASCULAR RIGHT COMMON FEMORAL COMPRESS: NORMAL
BH CV LOWER VASCULAR RIGHT COMMON FEMORAL PHASIC: NORMAL
BH CV LOWER VASCULAR RIGHT COMMON FEMORAL PHASIC: NORMAL
BH CV LOWER VASCULAR RIGHT COMMON FEMORAL SPONT: NORMAL
BH CV LOWER VASCULAR RIGHT COMMON FEMORAL SPONT: NORMAL
BH CV LOWER VASCULAR RIGHT DISTAL FEMORAL COMPRESS: NORMAL
BH CV LOWER VASCULAR RIGHT DISTAL FEMORAL COMPRESS: NORMAL
BH CV LOWER VASCULAR RIGHT GASTRONEMIUS COMPRESS: NORMAL
BH CV LOWER VASCULAR RIGHT GASTRONEMIUS COMPRESS: NORMAL
BH CV LOWER VASCULAR RIGHT GREATER SAPH AK COMPRESS: NORMAL
BH CV LOWER VASCULAR RIGHT GREATER SAPH AK COMPRESS: NORMAL
BH CV LOWER VASCULAR RIGHT GREATER SAPH BK COMPRESS: NORMAL
BH CV LOWER VASCULAR RIGHT GREATER SAPH BK COMPRESS: NORMAL
BH CV LOWER VASCULAR RIGHT LESSER SAPH COMPRESS: NORMAL
BH CV LOWER VASCULAR RIGHT LESSER SAPH COMPRESS: NORMAL
BH CV LOWER VASCULAR RIGHT MID FEMORAL AUGMENT: NORMAL
BH CV LOWER VASCULAR RIGHT MID FEMORAL AUGMENT: NORMAL
BH CV LOWER VASCULAR RIGHT MID FEMORAL COMPETENT: NORMAL
BH CV LOWER VASCULAR RIGHT MID FEMORAL COMPETENT: NORMAL
BH CV LOWER VASCULAR RIGHT MID FEMORAL COMPRESS: NORMAL
BH CV LOWER VASCULAR RIGHT MID FEMORAL COMPRESS: NORMAL
BH CV LOWER VASCULAR RIGHT MID FEMORAL PHASIC: NORMAL
BH CV LOWER VASCULAR RIGHT MID FEMORAL PHASIC: NORMAL
BH CV LOWER VASCULAR RIGHT MID FEMORAL SPONT: NORMAL
BH CV LOWER VASCULAR RIGHT MID FEMORAL SPONT: NORMAL
BH CV LOWER VASCULAR RIGHT PERONEAL COMPRESS: NORMAL
BH CV LOWER VASCULAR RIGHT PERONEAL COMPRESS: NORMAL
BH CV LOWER VASCULAR RIGHT POPLITEAL AUGMENT: NORMAL
BH CV LOWER VASCULAR RIGHT POPLITEAL AUGMENT: NORMAL
BH CV LOWER VASCULAR RIGHT POPLITEAL COMPETENT: NORMAL
BH CV LOWER VASCULAR RIGHT POPLITEAL COMPETENT: NORMAL
BH CV LOWER VASCULAR RIGHT POPLITEAL COMPRESS: NORMAL
BH CV LOWER VASCULAR RIGHT POPLITEAL COMPRESS: NORMAL
BH CV LOWER VASCULAR RIGHT POPLITEAL PHASIC: NORMAL
BH CV LOWER VASCULAR RIGHT POPLITEAL PHASIC: NORMAL
BH CV LOWER VASCULAR RIGHT POPLITEAL SPONT: NORMAL
BH CV LOWER VASCULAR RIGHT POPLITEAL SPONT: NORMAL
BH CV LOWER VASCULAR RIGHT POSTERIOR TIBIAL COMPRESS: NORMAL
BH CV LOWER VASCULAR RIGHT POSTERIOR TIBIAL COMPRESS: NORMAL
BH CV LOWER VASCULAR RIGHT PROFUNDA FEMORAL COMPRESS: NORMAL
BH CV LOWER VASCULAR RIGHT PROFUNDA FEMORAL COMPRESS: NORMAL
BH CV LOWER VASCULAR RIGHT PROXIMAL FEMORAL COMPRESS: NORMAL
BH CV LOWER VASCULAR RIGHT PROXIMAL FEMORAL COMPRESS: NORMAL
BH CV LOWER VASCULAR RIGHT SAPHENOFEMORAL JUNCTION COMPRESS: NORMAL
BH CV LOWER VASCULAR RIGHT SAPHENOFEMORAL JUNCTION COMPRESS: NORMAL
BH CV XLRA - RV BASE: 3.5 CM
BH CV XLRA - RV LENGTH: 6.3 CM
BH CV XLRA - RV MID: 1.8 CM
BH CV XLRA - TDI S': 12.3 CM/SEC
BILIRUB SERPL-MCNC: 0.4 MG/DL (ref 0–1.2)
BILIRUB SERPL-MCNC: 0.4 MG/DL (ref 0–1.2)
BILIRUB UR QL STRIP: NEGATIVE
BILIRUB UR QL STRIP: NEGATIVE
BUN SERPL-MCNC: 10 MG/DL (ref 8–23)
BUN SERPL-MCNC: 11 MG/DL (ref 8–23)
BUN SERPL-MCNC: 11 MG/DL (ref 8–23)
BUN SERPL-MCNC: 13 MG/DL (ref 8–23)
BUN SERPL-MCNC: 14 MG/DL (ref 8–23)
BUN SERPL-MCNC: 19 MG/DL (ref 8–23)
BUN SERPL-MCNC: 22 MG/DL (ref 8–23)
BUN SERPL-MCNC: 6 MG/DL (ref 8–23)
BUN SERPL-MCNC: 9 MG/DL (ref 8–23)
BUN/CREAT SERPL: 24 (ref 7–25)
BUN/CREAT SERPL: 24.4 (ref 7–25)
BUN/CREAT SERPL: 28 (ref 7–25)
BUN/CREAT SERPL: 30 (ref 7–25)
BUN/CREAT SERPL: 31.4 (ref 7–25)
BUN/CREAT SERPL: 36.1 (ref 7–25)
BUN/CREAT SERPL: 37 (ref 7–25)
BUN/CREAT SERPL: 44.9 (ref 7–25)
BUN/CREAT SERPL: 46.3 (ref 7–25)
CALCIUM SPEC-SCNC: 7.7 MG/DL (ref 8.6–10.5)
CALCIUM SPEC-SCNC: 7.8 MG/DL (ref 8.6–10.5)
CALCIUM SPEC-SCNC: 7.9 MG/DL (ref 8.6–10.5)
CALCIUM SPEC-SCNC: 7.9 MG/DL (ref 8.6–10.5)
CALCIUM SPEC-SCNC: 8 MG/DL (ref 8.6–10.5)
CALCIUM SPEC-SCNC: 8 MG/DL (ref 8.6–10.5)
CALCIUM SPEC-SCNC: 8.2 MG/DL (ref 8.6–10.5)
CALCIUM SPEC-SCNC: 8.4 MG/DL (ref 8.6–10.5)
CALCIUM SPEC-SCNC: 9 MG/DL (ref 8.6–10.5)
CHLORIDE SERPL-SCNC: 78 MMOL/L (ref 98–107)
CHLORIDE SERPL-SCNC: 79 MMOL/L (ref 98–107)
CHLORIDE SERPL-SCNC: 82 MMOL/L (ref 98–107)
CHLORIDE SERPL-SCNC: 85 MMOL/L (ref 98–107)
CHLORIDE SERPL-SCNC: 86 MMOL/L (ref 98–107)
CHLORIDE SERPL-SCNC: 87 MMOL/L (ref 98–107)
CHLORIDE SERPL-SCNC: 88 MMOL/L (ref 98–107)
CHLORIDE UR-SCNC: <20 MMOL/L
CHOLEST SERPL-MCNC: 137 MG/DL (ref 0–200)
CLARITY UR: CLEAR
CLARITY UR: CLEAR
CO2 SERPL-SCNC: 25 MMOL/L (ref 22–29)
CO2 SERPL-SCNC: 25.8 MMOL/L (ref 22–29)
CO2 SERPL-SCNC: 28.8 MMOL/L (ref 22–29)
CO2 SERPL-SCNC: 29.1 MMOL/L (ref 22–29)
CO2 SERPL-SCNC: 30.8 MMOL/L (ref 22–29)
CO2 SERPL-SCNC: 31.9 MMOL/L (ref 22–29)
CO2 SERPL-SCNC: 34.1 MMOL/L (ref 22–29)
CO2 SERPL-SCNC: 34.6 MMOL/L (ref 22–29)
CO2 SERPL-SCNC: 36.8 MMOL/L (ref 22–29)
COLOR UR: ABNORMAL
COLOR UR: YELLOW
CORTIS SERPL-MCNC: 9.55 MCG/DL
CREAT SERPL-MCNC: 0.25 MG/DL (ref 0.57–1)
CREAT SERPL-MCNC: 0.27 MG/DL (ref 0.57–1)
CREAT SERPL-MCNC: 0.3 MG/DL (ref 0.57–1)
CREAT SERPL-MCNC: 0.35 MG/DL (ref 0.57–1)
CREAT SERPL-MCNC: 0.36 MG/DL (ref 0.57–1)
CREAT SERPL-MCNC: 0.41 MG/DL (ref 0.57–1)
CREAT SERPL-MCNC: 0.45 MG/DL (ref 0.57–1)
CREAT SERPL-MCNC: 0.49 MG/DL (ref 0.57–1)
CREAT SERPL-MCNC: 0.5 MG/DL (ref 0.57–1)
CREAT UR-MCNC: 11.8 MG/DL
CREAT UR-MCNC: 4.8 MG/DL
CRP SERPL-MCNC: 3.14 MG/DL (ref 0–0.5)
D DIMER PPP FEU-MCNC: >20 MCGFEU/ML (ref 0–0.49)
DEPRECATED RDW RBC AUTO: 43.2 FL (ref 37–54)
DEPRECATED RDW RBC AUTO: 44.6 FL (ref 37–54)
DEPRECATED RDW RBC AUTO: 44.6 FL (ref 37–54)
DEPRECATED RDW RBC AUTO: 45 FL (ref 37–54)
DEPRECATED RDW RBC AUTO: 45.1 FL (ref 37–54)
DEPRECATED RDW RBC AUTO: 45.3 FL (ref 37–54)
DEPRECATED RDW RBC AUTO: 47.9 FL (ref 37–54)
EOSINOPHIL # BLD AUTO: 0 10*3/MM3 (ref 0–0.4)
EOSINOPHIL # BLD AUTO: 0.01 10*3/MM3 (ref 0–0.4)
EOSINOPHIL # BLD AUTO: 0.05 10*3/MM3 (ref 0–0.4)
EOSINOPHIL NFR BLD AUTO: 0 % (ref 0.3–6.2)
EOSINOPHIL NFR BLD AUTO: 0.1 % (ref 0.3–6.2)
EOSINOPHIL NFR BLD AUTO: 0.6 % (ref 0.3–6.2)
ERYTHROCYTE [DISTWIDTH] IN BLOOD BY AUTOMATED COUNT: 13.2 % (ref 12.3–15.4)
ERYTHROCYTE [DISTWIDTH] IN BLOOD BY AUTOMATED COUNT: 13.2 % (ref 12.3–15.4)
ERYTHROCYTE [DISTWIDTH] IN BLOOD BY AUTOMATED COUNT: 13.4 % (ref 12.3–15.4)
ERYTHROCYTE [DISTWIDTH] IN BLOOD BY AUTOMATED COUNT: 13.5 % (ref 12.3–15.4)
ERYTHROCYTE [DISTWIDTH] IN BLOOD BY AUTOMATED COUNT: 13.7 % (ref 12.3–15.4)
ERYTHROCYTE [DISTWIDTH] IN BLOOD BY AUTOMATED COUNT: 13.8 % (ref 12.3–15.4)
ERYTHROCYTE [DISTWIDTH] IN BLOOD BY AUTOMATED COUNT: 14 % (ref 12.3–15.4)
ERYTHROCYTE [SEDIMENTATION RATE] IN BLOOD: 34 MM/HR (ref 0–30)
FERRITIN SERPL-MCNC: 868 NG/ML (ref 13–150)
GFR SERPL CREATININE-BSD FRML MDRD: 117 ML/MIN/1.73
GFR SERPL CREATININE-BSD FRML MDRD: 119 ML/MIN/1.73
GFR SERPL CREATININE-BSD FRML MDRD: 132 ML/MIN/1.73
GFR SERPL CREATININE-BSD FRML MDRD: 147 ML/MIN/1.73
GFR SERPL CREATININE-BSD FRML MDRD: >150 ML/MIN/1.73
GLOBULIN UR ELPH-MCNC: 2.7 GM/DL
GLOBULIN UR ELPH-MCNC: 3 GM/DL
GLUCOSE BLDC GLUCOMTR-MCNC: 89 MG/DL (ref 70–130)
GLUCOSE BLDC GLUCOMTR-MCNC: 90 MG/DL (ref 70–130)
GLUCOSE SERPL-MCNC: 63 MG/DL (ref 65–99)
GLUCOSE SERPL-MCNC: 70 MG/DL (ref 65–99)
GLUCOSE SERPL-MCNC: 77 MG/DL (ref 65–99)
GLUCOSE SERPL-MCNC: 84 MG/DL (ref 65–99)
GLUCOSE SERPL-MCNC: 84 MG/DL (ref 65–99)
GLUCOSE SERPL-MCNC: 88 MG/DL (ref 65–99)
GLUCOSE SERPL-MCNC: 95 MG/DL (ref 65–99)
GLUCOSE SERPL-MCNC: 96 MG/DL (ref 65–99)
GLUCOSE SERPL-MCNC: 97 MG/DL (ref 65–99)
GLUCOSE UR STRIP-MCNC: NEGATIVE MG/DL
GLUCOSE UR STRIP-MCNC: NEGATIVE MG/DL
HBA1C MFR BLD: 5.2 % (ref 4.8–5.6)
HCT VFR BLD AUTO: 32.7 % (ref 34–46.6)
HCT VFR BLD AUTO: 34.9 % (ref 34–46.6)
HCT VFR BLD AUTO: 35.2 % (ref 34–46.6)
HCT VFR BLD AUTO: 36.4 % (ref 34–46.6)
HCT VFR BLD AUTO: 38.9 % (ref 34–46.6)
HCT VFR BLD AUTO: 40.1 % (ref 34–46.6)
HCT VFR BLD AUTO: 40.4 % (ref 34–46.6)
HDLC SERPL-MCNC: 67 MG/DL (ref 40–60)
HGB BLD-MCNC: 10.9 G/DL (ref 12–15.9)
HGB BLD-MCNC: 11.3 G/DL (ref 12–15.9)
HGB BLD-MCNC: 11.7 G/DL (ref 12–15.9)
HGB BLD-MCNC: 11.8 G/DL (ref 12–15.9)
HGB BLD-MCNC: 12.2 G/DL (ref 12–15.9)
HGB BLD-MCNC: 13.4 G/DL (ref 12–15.9)
HGB BLD-MCNC: 13.4 G/DL (ref 12–15.9)
HGB UR QL STRIP.AUTO: ABNORMAL
HGB UR QL STRIP.AUTO: NEGATIVE
HYALINE CASTS UR QL AUTO: ABNORMAL /LPF
IMM GRANULOCYTES # BLD AUTO: 0.03 10*3/MM3 (ref 0–0.05)
IMM GRANULOCYTES # BLD AUTO: 0.06 10*3/MM3 (ref 0–0.05)
IMM GRANULOCYTES # BLD AUTO: 0.1 10*3/MM3 (ref 0–0.05)
IMM GRANULOCYTES NFR BLD AUTO: 0.4 % (ref 0–0.5)
IMM GRANULOCYTES NFR BLD AUTO: 0.5 % (ref 0–0.5)
IMM GRANULOCYTES NFR BLD AUTO: 0.7 % (ref 0–0.5)
INR PPP: 1.04 (ref 0.9–1.1)
IRON 24H UR-MRATE: 29 MCG/DL (ref 37–145)
IRON SATN MFR SERPL: 11 % (ref 14–48)
KETONES UR QL STRIP: ABNORMAL
KETONES UR QL STRIP: NEGATIVE
LDLC SERPL CALC-MCNC: 59 MG/DL (ref 0–100)
LDLC/HDLC SERPL: 0.9 {RATIO}
LEFT ATRIUM VOLUME INDEX: 20.2 ML/M2
LEUKOCYTE ESTERASE UR QL STRIP.AUTO: NEGATIVE
LEUKOCYTE ESTERASE UR QL STRIP.AUTO: NEGATIVE
LYMPHOCYTES # BLD AUTO: 0.41 10*3/MM3 (ref 0.7–3.1)
LYMPHOCYTES # BLD AUTO: 0.56 10*3/MM3 (ref 0.7–3.1)
LYMPHOCYTES # BLD AUTO: 0.86 10*3/MM3 (ref 0.7–3.1)
LYMPHOCYTES NFR BLD AUTO: 10.2 % (ref 19.6–45.3)
LYMPHOCYTES NFR BLD AUTO: 2.9 % (ref 19.6–45.3)
LYMPHOCYTES NFR BLD AUTO: 4.4 % (ref 19.6–45.3)
MAGNESIUM SERPL-MCNC: 1.7 MG/DL (ref 1.6–2.4)
MAGNESIUM SERPL-MCNC: 1.8 MG/DL (ref 1.6–2.4)
MAGNESIUM SERPL-MCNC: 1.9 MG/DL (ref 1.6–2.4)
MAGNESIUM SERPL-MCNC: 2.2 MG/DL (ref 1.6–2.4)
MAGNESIUM SERPL-MCNC: 2.3 MG/DL (ref 1.6–2.4)
MAGNESIUM SERPL-MCNC: 2.6 MG/DL (ref 1.6–2.4)
MAXIMAL PREDICTED HEART RATE: 133 BPM
MCH RBC QN AUTO: 29.5 PG (ref 26.6–33)
MCH RBC QN AUTO: 29.6 PG (ref 26.6–33)
MCH RBC QN AUTO: 29.6 PG (ref 26.6–33)
MCH RBC QN AUTO: 29.8 PG (ref 26.6–33)
MCH RBC QN AUTO: 29.8 PG (ref 26.6–33)
MCH RBC QN AUTO: 29.9 PG (ref 26.6–33)
MCH RBC QN AUTO: 30 PG (ref 26.6–33)
MCHC RBC AUTO-ENTMCNC: 31.4 G/DL (ref 31.5–35.7)
MCHC RBC AUTO-ENTMCNC: 32.1 G/DL (ref 31.5–35.7)
MCHC RBC AUTO-ENTMCNC: 32.4 G/DL (ref 31.5–35.7)
MCHC RBC AUTO-ENTMCNC: 33.2 G/DL (ref 31.5–35.7)
MCHC RBC AUTO-ENTMCNC: 33.3 G/DL (ref 31.5–35.7)
MCHC RBC AUTO-ENTMCNC: 33.4 G/DL (ref 31.5–35.7)
MCHC RBC AUTO-ENTMCNC: 33.5 G/DL (ref 31.5–35.7)
MCV RBC AUTO: 88.7 FL (ref 79–97)
MCV RBC AUTO: 89 FL (ref 79–97)
MCV RBC AUTO: 89.6 FL (ref 79–97)
MCV RBC AUTO: 90.4 FL (ref 79–97)
MCV RBC AUTO: 91 FL (ref 79–97)
MCV RBC AUTO: 92.1 FL (ref 79–97)
MCV RBC AUTO: 94.9 FL (ref 79–97)
MONOCYTES # BLD AUTO: 0.56 10*3/MM3 (ref 0.1–0.9)
MONOCYTES # BLD AUTO: 0.63 10*3/MM3 (ref 0.1–0.9)
MONOCYTES # BLD AUTO: 0.74 10*3/MM3 (ref 0.1–0.9)
MONOCYTES NFR BLD AUTO: 4.5 % (ref 5–12)
MONOCYTES NFR BLD AUTO: 5.8 % (ref 5–12)
MONOCYTES NFR BLD AUTO: 6.7 % (ref 5–12)
NEUTROPHILS NFR BLD AUTO: 11.47 10*3/MM3 (ref 1.7–7)
NEUTROPHILS NFR BLD AUTO: 12.73 10*3/MM3 (ref 1.7–7)
NEUTROPHILS NFR BLD AUTO: 6.85 10*3/MM3 (ref 1.7–7)
NEUTROPHILS NFR BLD AUTO: 81.5 % (ref 42.7–76)
NEUTROPHILS NFR BLD AUTO: 89.1 % (ref 42.7–76)
NEUTROPHILS NFR BLD AUTO: 91.6 % (ref 42.7–76)
NITRITE UR QL STRIP: NEGATIVE
NITRITE UR QL STRIP: NEGATIVE
NRBC BLD AUTO-RTO: 0 /100 WBC (ref 0–0.2)
NT-PROBNP SERPL-MCNC: 904.2 PG/ML (ref 0–1800)
NT-PROBNP SERPL-MCNC: 9124 PG/ML (ref 0–1800)
OSMOLALITY SERPL: 248 MOSM/KG (ref 280–301)
OSMOLALITY UR: 206 MOSM/KG
OSMOLALITY UR: 240 MOSM/KG
PH UR STRIP.AUTO: 8 [PH] (ref 5–8)
PH UR STRIP.AUTO: 8 [PH] (ref 5–8)
PLATELET # BLD AUTO: 163 10*3/MM3 (ref 140–450)
PLATELET # BLD AUTO: 189 10*3/MM3 (ref 140–450)
PLATELET # BLD AUTO: 233 10*3/MM3 (ref 140–450)
PLATELET # BLD AUTO: 234 10*3/MM3 (ref 140–450)
PLATELET # BLD AUTO: 259 10*3/MM3 (ref 140–450)
PLATELET # BLD AUTO: 300 10*3/MM3 (ref 140–450)
PLATELET # BLD AUTO: 350 10*3/MM3 (ref 140–450)
PMV BLD AUTO: 10.2 FL (ref 6–12)
PMV BLD AUTO: 10.2 FL (ref 6–12)
PMV BLD AUTO: 10.4 FL (ref 6–12)
PMV BLD AUTO: 10.7 FL (ref 6–12)
PMV BLD AUTO: 11.1 FL (ref 6–12)
PMV BLD AUTO: 9.2 FL (ref 6–12)
PMV BLD AUTO: 9.9 FL (ref 6–12)
POTASSIUM SERPL-SCNC: 3.2 MMOL/L (ref 3.5–5.2)
POTASSIUM SERPL-SCNC: 3.3 MMOL/L (ref 3.5–5.2)
POTASSIUM SERPL-SCNC: 3.6 MMOL/L (ref 3.5–5.2)
POTASSIUM SERPL-SCNC: 3.6 MMOL/L (ref 3.5–5.2)
POTASSIUM SERPL-SCNC: 3.7 MMOL/L (ref 3.5–5.2)
POTASSIUM SERPL-SCNC: 3.7 MMOL/L (ref 3.5–5.2)
POTASSIUM SERPL-SCNC: 4.2 MMOL/L (ref 3.5–5.2)
POTASSIUM SERPL-SCNC: 4.3 MMOL/L (ref 3.5–5.2)
POTASSIUM SERPL-SCNC: 4.6 MMOL/L (ref 3.5–5.2)
PROCALCITONIN SERPL-MCNC: 0.07 NG/ML (ref 0–0.25)
PROCALCITONIN SERPL-MCNC: 0.17 NG/ML (ref 0–0.25)
PROT SERPL-MCNC: 5.6 G/DL (ref 6–8.5)
PROT SERPL-MCNC: 6.4 G/DL (ref 6–8.5)
PROT UR QL STRIP: ABNORMAL
PROT UR QL STRIP: NEGATIVE
PROTHROMBIN TIME: 13.4 SECONDS (ref 11.7–14.2)
QT INTERVAL: 377 MS
RBC # BLD AUTO: 3.65 10*6/MM3 (ref 3.77–5.28)
RBC # BLD AUTO: 3.82 10*6/MM3 (ref 3.77–5.28)
RBC # BLD AUTO: 3.92 10*6/MM3 (ref 3.77–5.28)
RBC # BLD AUTO: 4 10*6/MM3 (ref 3.77–5.28)
RBC # BLD AUTO: 4.1 10*6/MM3 (ref 3.77–5.28)
RBC # BLD AUTO: 4.47 10*6/MM3 (ref 3.77–5.28)
RBC # BLD AUTO: 4.52 10*6/MM3 (ref 3.77–5.28)
RBC # UR: ABNORMAL /HPF
REF LAB TEST METHOD: ABNORMAL
SARS-COV-2 RNA RESP QL NAA+PROBE: NOT DETECTED
SARS-COV-2 RNA RESP QL NAA+PROBE: NOT DETECTED
SODIUM SERPL-SCNC: 119 MMOL/L (ref 136–145)
SODIUM SERPL-SCNC: 120 MMOL/L (ref 136–145)
SODIUM SERPL-SCNC: 120 MMOL/L (ref 136–145)
SODIUM SERPL-SCNC: 121 MMOL/L (ref 136–145)
SODIUM SERPL-SCNC: 121 MMOL/L (ref 136–145)
SODIUM SERPL-SCNC: 122 MMOL/L (ref 136–145)
SODIUM SERPL-SCNC: 123 MMOL/L (ref 136–145)
SODIUM SERPL-SCNC: 125 MMOL/L (ref 136–145)
SODIUM SERPL-SCNC: 127 MMOL/L (ref 136–145)
SODIUM SERPL-SCNC: 128 MMOL/L (ref 136–145)
SODIUM SERPL-SCNC: 132 MMOL/L (ref 136–145)
SODIUM UR-SCNC: 85 MMOL/L
SODIUM UR-SCNC: <20 MMOL/L
SP GR UR STRIP: 1.01 (ref 1–1.03)
SP GR UR STRIP: <=1.005 (ref 1–1.03)
SQUAMOUS #/AREA URNS HPF: ABNORMAL /HPF
STRESS TARGET HR: 113 BPM
TIBC SERPL-MCNC: 255 MCG/DL (ref 249–505)
TRANSFERRIN SERPL-MCNC: 182 MG/DL (ref 200–360)
TRIGL SERPL-MCNC: 47 MG/DL (ref 0–150)
TROPONIN T SERPL-MCNC: 0.14 NG/ML (ref 0–0.03)
TROPONIN T SERPL-MCNC: 0.17 NG/ML (ref 0–0.03)
TSH SERPL DL<=0.05 MIU/L-ACNC: 0.97 UIU/ML (ref 0.27–4.2)
URATE SERPL-MCNC: 4.4 MG/DL (ref 2.4–5.7)
URATE SERPL-MCNC: 4.4 MG/DL (ref 2.4–5.7)
URATE SERPL-MCNC: 5.2 MG/DL (ref 2.4–5.7)
URATE SERPL-MCNC: 5.4 MG/DL (ref 2.4–5.7)
URATE SERPL-MCNC: 5.4 MG/DL (ref 2.4–5.7)
URATE SERPL-MCNC: 5.6 MG/DL (ref 2.4–5.7)
UROBILINOGEN UR QL STRIP: ABNORMAL
UROBILINOGEN UR QL STRIP: NORMAL
VLDLC SERPL-MCNC: 11 MG/DL (ref 5–40)
WBC # BLD AUTO: 10.09 10*3/MM3 (ref 3.4–10.8)
WBC # BLD AUTO: 12.86 10*3/MM3 (ref 3.4–10.8)
WBC # BLD AUTO: 13.91 10*3/MM3 (ref 3.4–10.8)
WBC # BLD AUTO: 8.11 10*3/MM3 (ref 3.4–10.8)
WBC # BLD AUTO: 8.4 10*3/MM3 (ref 3.4–10.8)
WBC # BLD AUTO: 8.61 10*3/MM3 (ref 3.4–10.8)
WBC # BLD AUTO: 9.07 10*3/MM3 (ref 3.4–10.8)
WBC UR QL AUTO: ABNORMAL /HPF

## 2021-01-01 PROCEDURE — 74018 RADEX ABDOMEN 1 VIEW: CPT

## 2021-01-01 PROCEDURE — 80048 BASIC METABOLIC PNL TOTAL CA: CPT | Performed by: INTERNAL MEDICINE

## 2021-01-01 PROCEDURE — 83735 ASSAY OF MAGNESIUM: CPT | Performed by: INTERNAL MEDICINE

## 2021-01-01 PROCEDURE — 99232 SBSQ HOSP IP/OBS MODERATE 35: CPT | Performed by: NURSE PRACTITIONER

## 2021-01-01 PROCEDURE — 25010000002 CEFAZOLIN 1-4 GM/50ML-% SOLUTION: Performed by: INTERNAL MEDICINE

## 2021-01-01 PROCEDURE — 25010000002 LORAZEPAM PER 2 MG: Performed by: INTERNAL MEDICINE

## 2021-01-01 PROCEDURE — 71045 X-RAY EXAM CHEST 1 VIEW: CPT

## 2021-01-01 PROCEDURE — 84300 ASSAY OF URINE SODIUM: CPT | Performed by: INTERNAL MEDICINE

## 2021-01-01 PROCEDURE — 83036 HEMOGLOBIN GLYCOSYLATED A1C: CPT | Performed by: INTERNAL MEDICINE

## 2021-01-01 PROCEDURE — 93306 TTE W/DOPPLER COMPLETE: CPT

## 2021-01-01 PROCEDURE — 81001 URINALYSIS AUTO W/SCOPE: CPT | Performed by: INTERNAL MEDICINE

## 2021-01-01 PROCEDURE — 71046 X-RAY EXAM CHEST 2 VIEWS: CPT

## 2021-01-01 PROCEDURE — 99221 1ST HOSP IP/OBS SF/LOW 40: CPT | Performed by: INTERNAL MEDICINE

## 2021-01-01 PROCEDURE — 99223 1ST HOSP IP/OBS HIGH 75: CPT | Performed by: INTERNAL MEDICINE

## 2021-01-01 PROCEDURE — 80053 COMPREHEN METABOLIC PANEL: CPT | Performed by: INTERNAL MEDICINE

## 2021-01-01 PROCEDURE — 97161 PT EVAL LOW COMPLEX 20 MIN: CPT

## 2021-01-01 PROCEDURE — 85027 COMPLETE CBC AUTOMATED: CPT | Performed by: INTERNAL MEDICINE

## 2021-01-01 PROCEDURE — 84484 ASSAY OF TROPONIN QUANT: CPT | Performed by: INTERNAL MEDICINE

## 2021-01-01 PROCEDURE — 99231 SBSQ HOSP IP/OBS SF/LOW 25: CPT | Performed by: SURGERY

## 2021-01-01 PROCEDURE — 97530 THERAPEUTIC ACTIVITIES: CPT

## 2021-01-01 PROCEDURE — 80061 LIPID PANEL: CPT | Performed by: INTERNAL MEDICINE

## 2021-01-01 PROCEDURE — 25010000002 ENOXAPARIN PER 10 MG: Performed by: INTERNAL MEDICINE

## 2021-01-01 PROCEDURE — 83880 ASSAY OF NATRIURETIC PEPTIDE: CPT | Performed by: INTERNAL MEDICINE

## 2021-01-01 PROCEDURE — 99284 EMERGENCY DEPT VISIT MOD MDM: CPT

## 2021-01-01 PROCEDURE — A9577 INJ MULTIHANCE: HCPCS | Performed by: INTERNAL MEDICINE

## 2021-01-01 PROCEDURE — 85652 RBC SED RATE AUTOMATED: CPT | Performed by: INTERNAL MEDICINE

## 2021-01-01 PROCEDURE — 93010 ELECTROCARDIOGRAM REPORT: CPT | Performed by: INTERNAL MEDICINE

## 2021-01-01 PROCEDURE — U0005 INFEC AGEN DETEC AMPLI PROBE: HCPCS | Performed by: SURGERY

## 2021-01-01 PROCEDURE — 82570 ASSAY OF URINE CREATININE: CPT | Performed by: INTERNAL MEDICINE

## 2021-01-01 PROCEDURE — 72114 X-RAY EXAM L-S SPINE BENDING: CPT

## 2021-01-01 PROCEDURE — U0003 INFECTIOUS AGENT DETECTION BY NUCLEIC ACID (DNA OR RNA); SEVERE ACUTE RESPIRATORY SYNDROME CORONAVIRUS 2 (SARS-COV-2) (CORONAVIRUS DISEASE [COVID-19]), AMPLIFIED PROBE TECHNIQUE, MAKING USE OF HIGH THROUGHPUT TECHNOLOGIES AS DESCRIBED BY CMS-2020-01-R: HCPCS | Performed by: EMERGENCY MEDICINE

## 2021-01-01 PROCEDURE — 85610 PROTHROMBIN TIME: CPT | Performed by: EMERGENCY MEDICINE

## 2021-01-01 PROCEDURE — 97110 THERAPEUTIC EXERCISES: CPT

## 2021-01-01 PROCEDURE — 84550 ASSAY OF BLOOD/URIC ACID: CPT | Performed by: INTERNAL MEDICINE

## 2021-01-01 PROCEDURE — 82533 TOTAL CORTISOL: CPT | Performed by: INTERNAL MEDICINE

## 2021-01-01 PROCEDURE — 25010000002 MORPHINE PER 10 MG: Performed by: INTERNAL MEDICINE

## 2021-01-01 PROCEDURE — 25010000002 FUROSEMIDE PER 20 MG: Performed by: EMERGENCY MEDICINE

## 2021-01-01 PROCEDURE — U0005 INFEC AGEN DETEC AMPLI PROBE: HCPCS | Performed by: EMERGENCY MEDICINE

## 2021-01-01 PROCEDURE — 83935 ASSAY OF URINE OSMOLALITY: CPT | Performed by: INTERNAL MEDICINE

## 2021-01-01 PROCEDURE — 91300 HC SARSCOV02 VAC 30MCG/0.3ML IM: CPT | Performed by: INTERNAL MEDICINE

## 2021-01-01 PROCEDURE — 80053 COMPREHEN METABOLIC PANEL: CPT | Performed by: EMERGENCY MEDICINE

## 2021-01-01 PROCEDURE — 82436 ASSAY OF URINE CHLORIDE: CPT | Performed by: INTERNAL MEDICINE

## 2021-01-01 PROCEDURE — U0003 INFECTIOUS AGENT DETECTION BY NUCLEIC ACID (DNA OR RNA); SEVERE ACUTE RESPIRATORY SYNDROME CORONAVIRUS 2 (SARS-COV-2) (CORONAVIRUS DISEASE [COVID-19]), AMPLIFIED PROBE TECHNIQUE, MAKING USE OF HIGH THROUGHPUT TECHNOLOGIES AS DESCRIBED BY CMS-2020-01-R: HCPCS | Performed by: SURGERY

## 2021-01-01 PROCEDURE — 25010000002 FUROSEMIDE PER 20 MG: Performed by: INTERNAL MEDICINE

## 2021-01-01 PROCEDURE — 99221 1ST HOSP IP/OBS SF/LOW 40: CPT | Performed by: SURGERY

## 2021-01-01 PROCEDURE — 99233 SBSQ HOSP IP/OBS HIGH 50: CPT | Performed by: NURSE PRACTITIONER

## 2021-01-01 PROCEDURE — 71275 CT ANGIOGRAPHY CHEST: CPT

## 2021-01-01 PROCEDURE — 83880 ASSAY OF NATRIURETIC PEPTIDE: CPT | Performed by: EMERGENCY MEDICINE

## 2021-01-01 PROCEDURE — 85025 COMPLETE CBC W/AUTO DIFF WBC: CPT

## 2021-01-01 PROCEDURE — 81003 URINALYSIS AUTO W/O SCOPE: CPT | Performed by: INTERNAL MEDICINE

## 2021-01-01 PROCEDURE — 85025 COMPLETE CBC W/AUTO DIFF WBC: CPT | Performed by: INTERNAL MEDICINE

## 2021-01-01 PROCEDURE — 84145 PROCALCITONIN (PCT): CPT | Performed by: INTERNAL MEDICINE

## 2021-01-01 PROCEDURE — 82962 GLUCOSE BLOOD TEST: CPT

## 2021-01-01 PROCEDURE — 0 IOPAMIDOL PER 1 ML: Performed by: INTERNAL MEDICINE

## 2021-01-01 PROCEDURE — 25010000003 MAGNESIUM SULFATE 4 GM/100ML SOLUTION: Performed by: INTERNAL MEDICINE

## 2021-01-01 PROCEDURE — 25010000002 HYDROMORPHONE PER 4 MG: Performed by: INTERNAL MEDICINE

## 2021-01-01 PROCEDURE — 85379 FIBRIN DEGRADATION QUANT: CPT | Performed by: EMERGENCY MEDICINE

## 2021-01-01 PROCEDURE — 84466 ASSAY OF TRANSFERRIN: CPT

## 2021-01-01 PROCEDURE — 83540 ASSAY OF IRON: CPT

## 2021-01-01 PROCEDURE — 84145 PROCALCITONIN (PCT): CPT | Performed by: EMERGENCY MEDICINE

## 2021-01-01 PROCEDURE — 93005 ELECTROCARDIOGRAM TRACING: CPT | Performed by: EMERGENCY MEDICINE

## 2021-01-01 PROCEDURE — 0 GADOBENATE DIMEGLUMINE 529 MG/ML SOLUTION: Performed by: INTERNAL MEDICINE

## 2021-01-01 PROCEDURE — 99232 SBSQ HOSP IP/OBS MODERATE 35: CPT | Performed by: INTERNAL MEDICINE

## 2021-01-01 PROCEDURE — 82728 ASSAY OF FERRITIN: CPT

## 2021-01-01 PROCEDURE — 86140 C-REACTIVE PROTEIN: CPT | Performed by: INTERNAL MEDICINE

## 2021-01-01 PROCEDURE — 36415 COLL VENOUS BLD VENIPUNCTURE: CPT | Performed by: INTERNAL MEDICINE

## 2021-01-01 PROCEDURE — 83930 ASSAY OF BLOOD OSMOLALITY: CPT | Performed by: INTERNAL MEDICINE

## 2021-01-01 PROCEDURE — 93306 TTE W/DOPPLER COMPLETE: CPT | Performed by: INTERNAL MEDICINE

## 2021-01-01 PROCEDURE — 92610 EVALUATE SWALLOWING FUNCTION: CPT

## 2021-01-01 PROCEDURE — 25010000002 FUROSEMIDE PER 20 MG: Performed by: NURSE PRACTITIONER

## 2021-01-01 PROCEDURE — 36415 COLL VENOUS BLD VENIPUNCTURE: CPT

## 2021-01-01 PROCEDURE — 84295 ASSAY OF SERUM SODIUM: CPT | Performed by: INTERNAL MEDICINE

## 2021-01-01 PROCEDURE — 84484 ASSAY OF TROPONIN QUANT: CPT | Performed by: EMERGENCY MEDICINE

## 2021-01-01 PROCEDURE — 99214 OFFICE O/P EST MOD 30 MIN: CPT | Performed by: INTERNAL MEDICINE

## 2021-01-01 PROCEDURE — 72158 MRI LUMBAR SPINE W/O & W/DYE: CPT

## 2021-01-01 PROCEDURE — 84443 ASSAY THYROID STIM HORMONE: CPT | Performed by: INTERNAL MEDICINE

## 2021-01-01 PROCEDURE — 93970 EXTREMITY STUDY: CPT

## 2021-01-01 PROCEDURE — 99222 1ST HOSP IP/OBS MODERATE 55: CPT | Performed by: ORTHOPAEDIC SURGERY

## 2021-01-01 PROCEDURE — 84295 ASSAY OF SERUM SODIUM: CPT | Performed by: HOSPITALIST

## 2021-01-01 PROCEDURE — 0002A: CPT | Performed by: INTERNAL MEDICINE

## 2021-01-01 PROCEDURE — 85025 COMPLETE CBC W/AUTO DIFF WBC: CPT | Performed by: EMERGENCY MEDICINE

## 2021-01-01 PROCEDURE — 0001A: CPT | Performed by: INTERNAL MEDICINE

## 2021-01-01 RX ORDER — GLYCOPYRROLATE 0.2 MG/ML
0.4 INJECTION INTRAMUSCULAR; INTRAVENOUS
Status: DISCONTINUED | OUTPATIENT
Start: 2021-01-01 | End: 2021-01-01 | Stop reason: HOSPADM

## 2021-01-01 RX ORDER — DILTIAZEM HCL IN NACL,ISO-OSM 125 MG/125
5-15 PLASTIC BAG, INJECTION (ML) INTRAVENOUS
Status: DISCONTINUED | OUTPATIENT
Start: 2021-01-01 | End: 2021-01-01

## 2021-01-01 RX ORDER — LORAZEPAM 2 MG/ML
1 INJECTION INTRAMUSCULAR
Status: DISCONTINUED | OUTPATIENT
Start: 2021-01-01 | End: 2021-01-01 | Stop reason: HOSPADM

## 2021-01-01 RX ORDER — ACETAMINOPHEN 325 MG/1
650 TABLET ORAL EVERY 4 HOURS PRN
Status: DISCONTINUED | OUTPATIENT
Start: 2021-01-01 | End: 2021-01-01 | Stop reason: HOSPADM

## 2021-01-01 RX ORDER — NITROGLYCERIN 0.4 MG/1
0.4 TABLET SUBLINGUAL
Status: DISCONTINUED | OUTPATIENT
Start: 2021-01-01 | End: 2021-01-01

## 2021-01-01 RX ORDER — FUROSEMIDE 40 MG/1
40 TABLET ORAL DAILY
Status: DISCONTINUED | OUTPATIENT
Start: 2021-01-01 | End: 2021-01-01

## 2021-01-01 RX ORDER — HYDROMORPHONE HYDROCHLORIDE 1 MG/ML
0.5 INJECTION, SOLUTION INTRAMUSCULAR; INTRAVENOUS; SUBCUTANEOUS
Status: DISCONTINUED | OUTPATIENT
Start: 2021-01-01 | End: 2021-01-01

## 2021-01-01 RX ORDER — MAGNESIUM SULFATE HEPTAHYDRATE 40 MG/ML
2 INJECTION, SOLUTION INTRAVENOUS AS NEEDED
Status: DISCONTINUED | OUTPATIENT
Start: 2021-01-01 | End: 2021-01-01

## 2021-01-01 RX ORDER — ACETAMINOPHEN 325 MG/1
650 TABLET ORAL EVERY 6 HOURS PRN
Status: DISCONTINUED | OUTPATIENT
Start: 2021-01-01 | End: 2021-01-01 | Stop reason: HOSPADM

## 2021-01-01 RX ORDER — GLYCOPYRROLATE 0.2 MG/ML
0.2 INJECTION INTRAMUSCULAR; INTRAVENOUS
Status: DISCONTINUED | OUTPATIENT
Start: 2021-01-01 | End: 2021-01-01 | Stop reason: HOSPADM

## 2021-01-01 RX ORDER — FUROSEMIDE 10 MG/ML
40 INJECTION INTRAMUSCULAR; INTRAVENOUS ONCE
Status: COMPLETED | OUTPATIENT
Start: 2021-01-01 | End: 2021-01-01

## 2021-01-01 RX ORDER — HYDROMORPHONE HCL 110MG/55ML
1.5 PATIENT CONTROLLED ANALGESIA SYRINGE INTRAVENOUS
Status: DISCONTINUED | OUTPATIENT
Start: 2021-01-01 | End: 2021-01-01 | Stop reason: HOSPADM

## 2021-01-01 RX ORDER — SCOLOPAMINE TRANSDERMAL SYSTEM 1 MG/1
1 PATCH, EXTENDED RELEASE TRANSDERMAL
Status: DISCONTINUED | OUTPATIENT
Start: 2021-01-01 | End: 2021-01-01 | Stop reason: HOSPADM

## 2021-01-01 RX ORDER — FUROSEMIDE 10 MG/ML
20 INJECTION INTRAMUSCULAR; INTRAVENOUS EVERY 6 HOURS PRN
Status: DISCONTINUED | OUTPATIENT
Start: 2021-01-01 | End: 2021-01-01 | Stop reason: HOSPADM

## 2021-01-01 RX ORDER — LORAZEPAM 2 MG/ML
0.5 INJECTION INTRAMUSCULAR
Status: DISCONTINUED | OUTPATIENT
Start: 2021-01-01 | End: 2021-01-01 | Stop reason: HOSPADM

## 2021-01-01 RX ORDER — ASPIRIN 81 MG/1
81 TABLET ORAL DAILY
Status: DISCONTINUED | OUTPATIENT
Start: 2021-01-01 | End: 2021-01-01

## 2021-01-01 RX ORDER — POLYETHYLENE GLYCOL 3350 17 G/17G
17 POWDER, FOR SOLUTION ORAL DAILY
Status: DISCONTINUED | OUTPATIENT
Start: 2021-01-01 | End: 2021-01-01

## 2021-01-01 RX ORDER — CEFAZOLIN SODIUM 1 G/50ML
1 INJECTION, SOLUTION INTRAVENOUS EVERY 8 HOURS
Status: DISCONTINUED | OUTPATIENT
Start: 2021-01-01 | End: 2021-01-01 | Stop reason: HOSPADM

## 2021-01-01 RX ORDER — DIPHENOXYLATE HYDROCHLORIDE AND ATROPINE SULFATE 2.5; .025 MG/1; MG/1
1 TABLET ORAL
Status: DISCONTINUED | OUTPATIENT
Start: 2021-01-01 | End: 2021-01-01 | Stop reason: HOSPADM

## 2021-01-01 RX ORDER — ACETAMINOPHEN 650 MG/1
650 SUPPOSITORY RECTAL EVERY 4 HOURS PRN
Status: DISCONTINUED | OUTPATIENT
Start: 2021-01-01 | End: 2021-01-01 | Stop reason: HOSPADM

## 2021-01-01 RX ORDER — SODIUM CHLORIDE 0.9 % (FLUSH) 0.9 %
10 SYRINGE (ML) INJECTION EVERY 12 HOURS SCHEDULED
Status: DISCONTINUED | OUTPATIENT
Start: 2021-01-01 | End: 2021-01-01 | Stop reason: HOSPADM

## 2021-01-01 RX ORDER — SULFAMETHOXAZOLE AND TRIMETHOPRIM 800; 160 MG/1; MG/1
1 TABLET ORAL 2 TIMES DAILY
COMMUNITY
Start: 2021-01-01 | End: 2021-01-01

## 2021-01-01 RX ORDER — LORAZEPAM 2 MG/ML
0.5 CONCENTRATE ORAL
Status: DISCONTINUED | OUTPATIENT
Start: 2021-01-01 | End: 2021-01-01 | Stop reason: HOSPADM

## 2021-01-01 RX ORDER — LORAZEPAM 2 MG/ML
2 INJECTION INTRAMUSCULAR
Status: DISCONTINUED | OUTPATIENT
Start: 2021-01-01 | End: 2021-01-01 | Stop reason: HOSPADM

## 2021-01-01 RX ORDER — MORPHINE SULFATE 10 MG/ML
6 INJECTION INTRAMUSCULAR; INTRAVENOUS; SUBCUTANEOUS
Status: DISCONTINUED | OUTPATIENT
Start: 2021-01-01 | End: 2021-01-01 | Stop reason: HOSPADM

## 2021-01-01 RX ORDER — DOCUSATE SODIUM 100 MG/1
100 CAPSULE, LIQUID FILLED ORAL 2 TIMES DAILY PRN
Status: DISCONTINUED | OUTPATIENT
Start: 2021-01-01 | End: 2021-01-01

## 2021-01-01 RX ORDER — POTASSIUM CHLORIDE 750 MG/1
40 TABLET, FILM COATED, EXTENDED RELEASE ORAL AS NEEDED
Status: DISCONTINUED | OUTPATIENT
Start: 2021-01-01 | End: 2021-01-01

## 2021-01-01 RX ORDER — HYDROCODONE BITARTRATE AND ACETAMINOPHEN 5; 325 MG/1; MG/1
1 TABLET ORAL EVERY 4 HOURS PRN
Status: DISCONTINUED | OUTPATIENT
Start: 2021-01-01 | End: 2021-01-01

## 2021-01-01 RX ORDER — AMOXICILLIN 250 MG
2 CAPSULE ORAL 2 TIMES DAILY
Status: DISCONTINUED | OUTPATIENT
Start: 2021-01-01 | End: 2021-01-01

## 2021-01-01 RX ORDER — POLYETHYLENE GLYCOL 3350 17 G/17G
17 POWDER, FOR SOLUTION ORAL DAILY PRN
Status: DISCONTINUED | OUTPATIENT
Start: 2021-01-01 | End: 2021-01-01 | Stop reason: HOSPADM

## 2021-01-01 RX ORDER — HYDROCODONE BITARTRATE AND ACETAMINOPHEN 7.5; 325 MG/1; MG/1
1 TABLET ORAL EVERY 4 HOURS PRN
Status: DISCONTINUED | OUTPATIENT
Start: 2021-01-01 | End: 2021-01-01 | Stop reason: HOSPADM

## 2021-01-01 RX ORDER — MAGNESIUM SULFATE HEPTAHYDRATE 40 MG/ML
4 INJECTION, SOLUTION INTRAVENOUS AS NEEDED
Status: DISCONTINUED | OUTPATIENT
Start: 2021-01-01 | End: 2021-01-01

## 2021-01-01 RX ORDER — MORPHINE SULFATE 20 MG/ML
20 SOLUTION ORAL
Status: DISCONTINUED | OUTPATIENT
Start: 2021-01-01 | End: 2021-01-01 | Stop reason: HOSPADM

## 2021-01-01 RX ORDER — FUROSEMIDE 10 MG/ML
40 INJECTION INTRAMUSCULAR; INTRAVENOUS
Status: DISCONTINUED | OUTPATIENT
Start: 2021-01-01 | End: 2021-01-01

## 2021-01-01 RX ORDER — MORPHINE SULFATE 2 MG/ML
2 INJECTION, SOLUTION INTRAMUSCULAR; INTRAVENOUS
Status: DISCONTINUED | OUTPATIENT
Start: 2021-01-01 | End: 2021-01-01 | Stop reason: HOSPADM

## 2021-01-01 RX ORDER — POTASSIUM CHLORIDE 7.45 MG/ML
10 INJECTION INTRAVENOUS
Status: DISCONTINUED | OUTPATIENT
Start: 2021-01-01 | End: 2021-01-01

## 2021-01-01 RX ORDER — SODIUM CHLORIDE 0.9 % (FLUSH) 0.9 %
10 SYRINGE (ML) INJECTION AS NEEDED
Status: DISCONTINUED | OUTPATIENT
Start: 2021-01-01 | End: 2021-01-01

## 2021-01-01 RX ORDER — ACETAMINOPHEN 160 MG/5ML
650 SOLUTION ORAL EVERY 4 HOURS PRN
Status: DISCONTINUED | OUTPATIENT
Start: 2021-01-01 | End: 2021-01-01 | Stop reason: HOSPADM

## 2021-01-01 RX ORDER — NYSTATIN 100000 [USP'U]/G
POWDER TOPICAL EVERY 12 HOURS SCHEDULED
Status: DISCONTINUED | OUTPATIENT
Start: 2021-01-01 | End: 2021-01-01 | Stop reason: HOSPADM

## 2021-01-01 RX ORDER — ONDANSETRON 2 MG/ML
4 INJECTION INTRAMUSCULAR; INTRAVENOUS EVERY 6 HOURS PRN
Status: DISCONTINUED | OUTPATIENT
Start: 2021-01-01 | End: 2021-01-01

## 2021-01-01 RX ORDER — POLYETHYLENE GLYCOL 3350 17 G/17G
17 POWDER, FOR SOLUTION ORAL DAILY PRN
Status: DISCONTINUED | OUTPATIENT
Start: 2021-01-01 | End: 2021-01-01

## 2021-01-01 RX ORDER — MORPHINE SULFATE 4 MG/ML
4 INJECTION, SOLUTION INTRAMUSCULAR; INTRAVENOUS
Status: DISCONTINUED | OUTPATIENT
Start: 2021-01-01 | End: 2021-01-01 | Stop reason: HOSPADM

## 2021-01-01 RX ORDER — LORAZEPAM 0.5 MG/1
0.5 TABLET ORAL EVERY 6 HOURS PRN
Status: DISCONTINUED | OUTPATIENT
Start: 2021-01-01 | End: 2021-01-01

## 2021-01-01 RX ORDER — POTASSIUM CHLORIDE 1.5 G/1.77G
40 POWDER, FOR SOLUTION ORAL AS NEEDED
Status: DISCONTINUED | OUTPATIENT
Start: 2021-01-01 | End: 2021-01-01

## 2021-01-01 RX ORDER — HYDROMORPHONE HCL 110MG/55ML
0.5 PATIENT CONTROLLED ANALGESIA SYRINGE INTRAVENOUS
Status: DISCONTINUED | OUTPATIENT
Start: 2021-01-01 | End: 2021-01-01 | Stop reason: HOSPADM

## 2021-01-01 RX ORDER — LORAZEPAM 2 MG/ML
0.5 INJECTION INTRAMUSCULAR EVERY 6 HOURS PRN
Status: DISCONTINUED | OUTPATIENT
Start: 2021-01-01 | End: 2021-01-01

## 2021-01-01 RX ORDER — ATORVASTATIN CALCIUM 20 MG/1
80 TABLET, FILM COATED ORAL NIGHTLY
Status: DISCONTINUED | OUTPATIENT
Start: 2021-01-01 | End: 2021-01-01

## 2021-01-01 RX ORDER — CASTOR OIL AND BALSAM, PERU 788; 87 MG/G; MG/G
OINTMENT TOPICAL EVERY 12 HOURS SCHEDULED
Status: DISCONTINUED | OUTPATIENT
Start: 2021-01-01 | End: 2021-01-01 | Stop reason: HOSPADM

## 2021-01-01 RX ORDER — LORAZEPAM 2 MG/ML
1 CONCENTRATE ORAL
Status: DISCONTINUED | OUTPATIENT
Start: 2021-01-01 | End: 2021-01-01 | Stop reason: HOSPADM

## 2021-01-01 RX ORDER — BISACODYL 10 MG
10 SUPPOSITORY, RECTAL RECTAL DAILY PRN
Status: DISCONTINUED | OUTPATIENT
Start: 2021-01-01 | End: 2021-01-01 | Stop reason: HOSPADM

## 2021-01-01 RX ORDER — LORAZEPAM 2 MG/ML
2 CONCENTRATE ORAL
Status: DISCONTINUED | OUTPATIENT
Start: 2021-01-01 | End: 2021-01-01 | Stop reason: HOSPADM

## 2021-01-01 RX ORDER — MORPHINE SULFATE 20 MG/ML
5 SOLUTION ORAL
Status: DISCONTINUED | OUTPATIENT
Start: 2021-01-01 | End: 2021-01-01 | Stop reason: HOSPADM

## 2021-01-01 RX ORDER — AMOXICILLIN 250 MG
2 CAPSULE ORAL 2 TIMES DAILY PRN
Status: DISCONTINUED | OUTPATIENT
Start: 2021-01-01 | End: 2021-01-01 | Stop reason: HOSPADM

## 2021-01-01 RX ORDER — IPRATROPIUM BROMIDE AND ALBUTEROL SULFATE 2.5; .5 MG/3ML; MG/3ML
3 SOLUTION RESPIRATORY (INHALATION) EVERY 4 HOURS PRN
Status: DISCONTINUED | OUTPATIENT
Start: 2021-01-01 | End: 2021-01-01 | Stop reason: HOSPADM

## 2021-01-01 RX ORDER — MORPHINE SULFATE 20 MG/ML
10 SOLUTION ORAL
Status: DISCONTINUED | OUTPATIENT
Start: 2021-01-01 | End: 2021-01-01 | Stop reason: HOSPADM

## 2021-01-01 RX ORDER — DIPHENHYDRAMINE HYDROCHLORIDE AND LIDOCAINE HYDROCHLORIDE AND ALUMINUM HYDROXIDE AND MAGNESIUM HYDRO
5 KIT EVERY 4 HOURS PRN
Status: DISCONTINUED | OUTPATIENT
Start: 2021-01-01 | End: 2021-01-01 | Stop reason: HOSPADM

## 2021-01-01 RX ADMIN — CASTOR OIL AND BALSAM, PERU 5 G: 788; 87 OINTMENT TOPICAL at 20:50

## 2021-01-01 RX ADMIN — MORPHINE SULFATE 2 MG: 2 INJECTION, SOLUTION INTRAMUSCULAR; INTRAVENOUS at 11:56

## 2021-01-01 RX ADMIN — NYSTATIN: 100000 POWDER TOPICAL at 21:19

## 2021-01-01 RX ADMIN — ATORVASTATIN CALCIUM 80 MG: 80 TABLET, FILM COATED ORAL at 20:44

## 2021-01-01 RX ADMIN — CEFAZOLIN SODIUM 1 G: 1 INJECTION, SOLUTION INTRAVENOUS at 11:56

## 2021-01-01 RX ADMIN — CEFAZOLIN SODIUM 1 G: 1 INJECTION, SOLUTION INTRAVENOUS at 18:41

## 2021-01-01 RX ADMIN — CASTOR OIL AND BALSAM, PERU 5 G: 788; 87 OINTMENT TOPICAL at 20:49

## 2021-01-01 RX ADMIN — FUROSEMIDE 40 MG: 10 INJECTION, SOLUTION INTRAMUSCULAR; INTRAVENOUS at 17:37

## 2021-01-01 RX ADMIN — HYDROMORPHONE HYDROCHLORIDE 0.5 MG: 1 INJECTION, SOLUTION INTRAMUSCULAR; INTRAVENOUS; SUBCUTANEOUS at 19:27

## 2021-01-01 RX ADMIN — ACETAMINOPHEN 650 MG: 325 TABLET, FILM COATED ORAL at 21:25

## 2021-01-01 RX ADMIN — NYSTATIN: 100000 POWDER TOPICAL at 20:49

## 2021-01-01 RX ADMIN — ZINC OXIDE 1 APPLICATION: 200 OINTMENT TOPICAL at 19:53

## 2021-01-01 RX ADMIN — SODIUM CHLORIDE, PRESERVATIVE FREE 10 ML: 5 INJECTION INTRAVENOUS at 20:55

## 2021-01-01 RX ADMIN — CEFAZOLIN SODIUM 1 G: 1 INJECTION, SOLUTION INTRAVENOUS at 11:31

## 2021-01-01 RX ADMIN — FUROSEMIDE 40 MG: 10 INJECTION, SOLUTION INTRAMUSCULAR; INTRAVENOUS at 08:16

## 2021-01-01 RX ADMIN — SODIUM CHLORIDE, PRESERVATIVE FREE 10 ML: 5 INJECTION INTRAVENOUS at 21:30

## 2021-01-01 RX ADMIN — HYDROCODONE BITARTRATE AND ACETAMINOPHEN 1 TABLET: 7.5; 325 TABLET ORAL at 10:42

## 2021-01-01 RX ADMIN — METOPROLOL TARTRATE 25 MG: 25 TABLET, FILM COATED ORAL at 09:16

## 2021-01-01 RX ADMIN — CEFAZOLIN SODIUM 1 G: 1 INJECTION, SOLUTION INTRAVENOUS at 18:06

## 2021-01-01 RX ADMIN — ZINC OXIDE 1 APPLICATION: 200 OINTMENT TOPICAL at 11:53

## 2021-01-01 RX ADMIN — LORAZEPAM 0.5 MG: 2 INJECTION INTRAMUSCULAR; INTRAVENOUS at 19:51

## 2021-01-01 RX ADMIN — DOCUSATE SODIUM 50MG AND SENNOSIDES 8.6MG 2 TABLET: 8.6; 5 TABLET, FILM COATED ORAL at 09:16

## 2021-01-01 RX ADMIN — ATORVASTATIN CALCIUM 80 MG: 80 TABLET, FILM COATED ORAL at 20:50

## 2021-01-01 RX ADMIN — LORAZEPAM 0.5 MG: 2 INJECTION INTRAMUSCULAR; INTRAVENOUS at 01:32

## 2021-01-01 RX ADMIN — NYSTATIN: 100000 POWDER TOPICAL at 11:52

## 2021-01-01 RX ADMIN — ENOXAPARIN SODIUM 40 MG: 40 INJECTION SUBCUTANEOUS at 20:06

## 2021-01-01 RX ADMIN — POLYETHYLENE GLYCOL 3350 17 G: 17 POWDER, FOR SOLUTION ORAL at 09:16

## 2021-01-01 RX ADMIN — ASPIRIN 81 MG: 81 TABLET, COATED ORAL at 08:16

## 2021-01-01 RX ADMIN — NYSTATIN: 100000 POWDER TOPICAL at 21:25

## 2021-01-01 RX ADMIN — NYSTATIN: 100000 POWDER TOPICAL at 08:54

## 2021-01-01 RX ADMIN — FUROSEMIDE 40 MG: 10 INJECTION, SOLUTION INTRAMUSCULAR; INTRAVENOUS at 08:54

## 2021-01-01 RX ADMIN — SODIUM CHLORIDE, PRESERVATIVE FREE 10 ML: 5 INJECTION INTRAVENOUS at 11:56

## 2021-01-01 RX ADMIN — SODIUM CHLORIDE, PRESERVATIVE FREE 10 ML: 5 INJECTION INTRAVENOUS at 08:34

## 2021-01-01 RX ADMIN — ASPIRIN 81 MG: 81 TABLET, COATED ORAL at 09:15

## 2021-01-01 RX ADMIN — SODIUM CHLORIDE, PRESERVATIVE FREE 10 ML: 5 INJECTION INTRAVENOUS at 21:23

## 2021-01-01 RX ADMIN — METOPROLOL TARTRATE 25 MG: 25 TABLET, FILM COATED ORAL at 08:16

## 2021-01-01 RX ADMIN — ENOXAPARIN SODIUM 40 MG: 40 INJECTION SUBCUTANEOUS at 21:00

## 2021-01-01 RX ADMIN — DOCUSATE SODIUM 100 MG: 100 CAPSULE, LIQUID FILLED ORAL at 13:37

## 2021-01-01 RX ADMIN — SODIUM CHLORIDE, PRESERVATIVE FREE 10 ML: 5 INJECTION INTRAVENOUS at 21:59

## 2021-01-01 RX ADMIN — SODIUM CHLORIDE, PRESERVATIVE FREE 10 ML: 5 INJECTION INTRAVENOUS at 20:50

## 2021-01-01 RX ADMIN — SODIUM CHLORIDE, PRESERVATIVE FREE 10 ML: 5 INJECTION INTRAVENOUS at 08:16

## 2021-01-01 RX ADMIN — CEFAZOLIN SODIUM 1 G: 1 INJECTION, SOLUTION INTRAVENOUS at 18:31

## 2021-01-01 RX ADMIN — Medication 5 MG/HR: at 23:41

## 2021-01-01 RX ADMIN — ZINC OXIDE 1 APPLICATION: 200 OINTMENT TOPICAL at 21:59

## 2021-01-01 RX ADMIN — CASTOR OIL AND BALSAM, PERU 5 G: 788; 87 OINTMENT TOPICAL at 09:15

## 2021-01-01 RX ADMIN — ATORVASTATIN CALCIUM 80 MG: 80 TABLET, FILM COATED ORAL at 20:54

## 2021-01-01 RX ADMIN — POTASSIUM CHLORIDE 40 MEQ: 750 TABLET, EXTENDED RELEASE ORAL at 16:22

## 2021-01-01 RX ADMIN — LORAZEPAM 0.5 MG: 2 INJECTION INTRAMUSCULAR; INTRAVENOUS at 11:56

## 2021-01-01 RX ADMIN — SODIUM CHLORIDE, PRESERVATIVE FREE 10 ML: 5 INJECTION INTRAVENOUS at 11:15

## 2021-01-01 RX ADMIN — METOPROLOL TARTRATE 25 MG: 25 TABLET, FILM COATED ORAL at 20:54

## 2021-01-01 RX ADMIN — ASPIRIN 81 MG: 81 TABLET, COATED ORAL at 11:15

## 2021-01-01 RX ADMIN — POTASSIUM CHLORIDE 40 MEQ: 750 TABLET, EXTENDED RELEASE ORAL at 20:45

## 2021-01-01 RX ADMIN — HYDROMORPHONE HYDROCHLORIDE 0.5 MG: 1 INJECTION, SOLUTION INTRAMUSCULAR; INTRAVENOUS; SUBCUTANEOUS at 11:26

## 2021-01-01 RX ADMIN — ASPIRIN 81 MG: 81 TABLET, COATED ORAL at 14:37

## 2021-01-01 RX ADMIN — CEFAZOLIN SODIUM 1 G: 1 INJECTION, SOLUTION INTRAVENOUS at 03:06

## 2021-01-01 RX ADMIN — ATORVASTATIN CALCIUM 80 MG: 80 TABLET, FILM COATED ORAL at 20:49

## 2021-01-01 RX ADMIN — ZINC OXIDE 1 APPLICATION: 200 OINTMENT TOPICAL at 18:41

## 2021-01-01 RX ADMIN — DOCUSATE SODIUM 50MG AND SENNOSIDES 8.6MG 2 TABLET: 8.6; 5 TABLET, FILM COATED ORAL at 21:18

## 2021-01-01 RX ADMIN — CASTOR OIL AND BALSAM, PERU 5 G: 788; 87 OINTMENT TOPICAL at 20:54

## 2021-01-01 RX ADMIN — FUROSEMIDE 40 MG: 10 INJECTION, SOLUTION INTRAMUSCULAR; INTRAVENOUS at 19:18

## 2021-01-01 RX ADMIN — FUROSEMIDE 40 MG: 40 TABLET ORAL at 09:36

## 2021-01-01 RX ADMIN — POLYETHYLENE GLYCOL 3350 17 G: 17 POWDER, FOR SOLUTION ORAL at 20:50

## 2021-01-01 RX ADMIN — CEFAZOLIN SODIUM 1 G: 1 INJECTION, SOLUTION INTRAVENOUS at 19:52

## 2021-01-01 RX ADMIN — HYDROCODONE BITARTRATE AND ACETAMINOPHEN 1 TABLET: 5; 325 TABLET ORAL at 20:45

## 2021-01-01 RX ADMIN — NYSTATIN: 100000 POWDER TOPICAL at 09:15

## 2021-01-01 RX ADMIN — POTASSIUM CHLORIDE 40 MEQ: 750 TABLET, EXTENDED RELEASE ORAL at 13:19

## 2021-01-01 RX ADMIN — METOPROLOL TARTRATE 25 MG: 25 TABLET, FILM COATED ORAL at 21:18

## 2021-01-01 RX ADMIN — ZINC OXIDE 1 APPLICATION: 200 OINTMENT TOPICAL at 21:19

## 2021-01-01 RX ADMIN — MORPHINE SULFATE 4 MG: 4 INJECTION, SOLUTION INTRAMUSCULAR; INTRAVENOUS at 01:33

## 2021-01-01 RX ADMIN — METOPROLOL TARTRATE 25 MG: 25 TABLET, FILM COATED ORAL at 20:50

## 2021-01-01 RX ADMIN — CASTOR OIL AND BALSAM, PERU 5 G: 788; 87 OINTMENT TOPICAL at 21:19

## 2021-01-01 RX ADMIN — FUROSEMIDE 40 MG: 10 INJECTION, SOLUTION INTRAMUSCULAR; INTRAVENOUS at 08:34

## 2021-01-01 RX ADMIN — ZINC OXIDE 1 APPLICATION: 200 OINTMENT TOPICAL at 17:43

## 2021-01-01 RX ADMIN — SODIUM CHLORIDE 500 ML: 9 INJECTION, SOLUTION INTRAVENOUS at 16:25

## 2021-01-01 RX ADMIN — CASTOR OIL AND BALSAM, PERU 5 G: 788; 87 OINTMENT TOPICAL at 20:45

## 2021-01-01 RX ADMIN — LORAZEPAM 0.5 MG: 2 INJECTION INTRAMUSCULAR; INTRAVENOUS at 22:17

## 2021-01-01 RX ADMIN — METOPROLOL TARTRATE 25 MG: 25 TABLET, FILM COATED ORAL at 20:45

## 2021-01-01 RX ADMIN — ENOXAPARIN SODIUM 80 MG: 80 INJECTION, SOLUTION INTRAVENOUS; SUBCUTANEOUS at 08:32

## 2021-01-01 RX ADMIN — METOPROLOL TARTRATE 25 MG: 25 TABLET, FILM COATED ORAL at 20:55

## 2021-01-01 RX ADMIN — NYSTATIN: 100000 POWDER TOPICAL at 21:59

## 2021-01-01 RX ADMIN — CEFAZOLIN SODIUM 1 G: 1 INJECTION, SOLUTION INTRAVENOUS at 09:41

## 2021-01-01 RX ADMIN — CASTOR OIL AND BALSAM, PERU 5 G: 788; 87 OINTMENT TOPICAL at 11:16

## 2021-01-01 RX ADMIN — BISACODYL 10 MG: 10 SUPPOSITORY RECTAL at 11:15

## 2021-01-01 RX ADMIN — MAGNESIUM SULFATE HEPTAHYDRATE 4 G: 4 INJECTION, SOLUTION INTRAVENOUS at 14:58

## 2021-01-01 RX ADMIN — ZINC OXIDE 1 APPLICATION: 200 OINTMENT TOPICAL at 18:08

## 2021-01-01 RX ADMIN — SODIUM CHLORIDE, PRESERVATIVE FREE 10 ML: 5 INJECTION INTRAVENOUS at 11:17

## 2021-01-01 RX ADMIN — ZINC OXIDE 1 APPLICATION: 200 OINTMENT TOPICAL at 20:49

## 2021-01-01 RX ADMIN — FUROSEMIDE 40 MG: 10 INJECTION, SOLUTION INTRAMUSCULAR; INTRAVENOUS at 11:15

## 2021-01-01 RX ADMIN — IOPAMIDOL 95 ML: 755 INJECTION, SOLUTION INTRAVENOUS at 21:27

## 2021-01-01 RX ADMIN — CASTOR OIL AND BALSAM, PERU 5 G: 788; 87 OINTMENT TOPICAL at 22:00

## 2021-01-01 RX ADMIN — POTASSIUM CHLORIDE 40 MEQ: 750 TABLET, EXTENDED RELEASE ORAL at 14:58

## 2021-01-01 RX ADMIN — CASTOR OIL AND BALSAM, PERU 5 G: 788; 87 OINTMENT TOPICAL at 08:54

## 2021-01-01 RX ADMIN — ACETAMINOPHEN 650 MG: 325 TABLET, FILM COATED ORAL at 00:02

## 2021-01-01 RX ADMIN — METOPROLOL TARTRATE 25 MG: 25 TABLET, FILM COATED ORAL at 09:15

## 2021-01-01 RX ADMIN — CEFAZOLIN SODIUM 1 G: 1 INJECTION, SOLUTION INTRAVENOUS at 02:19

## 2021-01-01 RX ADMIN — ATORVASTATIN CALCIUM 80 MG: 80 TABLET, FILM COATED ORAL at 21:18

## 2021-01-01 RX ADMIN — ASPIRIN 81 MG: 81 TABLET, COATED ORAL at 09:16

## 2021-01-01 RX ADMIN — DOCUSATE SODIUM 50MG AND SENNOSIDES 8.6MG 2 TABLET: 8.6; 5 TABLET, FILM COATED ORAL at 08:54

## 2021-01-01 RX ADMIN — CEFAZOLIN SODIUM 1 G: 1 INJECTION, SOLUTION INTRAVENOUS at 11:13

## 2021-01-01 RX ADMIN — ZINC OXIDE 1 APPLICATION: 200 OINTMENT TOPICAL at 09:35

## 2021-01-01 RX ADMIN — CEFAZOLIN SODIUM 1 G: 1 INJECTION, SOLUTION INTRAVENOUS at 02:02

## 2021-01-01 RX ADMIN — METOPROLOL TARTRATE 25 MG: 25 TABLET, FILM COATED ORAL at 07:32

## 2021-01-01 RX ADMIN — DOCUSATE SODIUM 50MG AND SENNOSIDES 8.6MG 2 TABLET: 8.6; 5 TABLET, FILM COATED ORAL at 09:15

## 2021-01-01 RX ADMIN — FUROSEMIDE 40 MG: 10 INJECTION, SOLUTION INTRAMUSCULAR; INTRAVENOUS at 17:02

## 2021-01-01 RX ADMIN — CEFAZOLIN SODIUM 1 G: 1 INJECTION, SOLUTION INTRAVENOUS at 11:16

## 2021-01-01 RX ADMIN — SODIUM CHLORIDE, PRESERVATIVE FREE 10 ML: 5 INJECTION INTRAVENOUS at 09:15

## 2021-01-01 RX ADMIN — FUROSEMIDE 40 MG: 10 INJECTION, SOLUTION INTRAMUSCULAR; INTRAVENOUS at 17:18

## 2021-01-01 RX ADMIN — ENOXAPARIN SODIUM 80 MG: 80 INJECTION, SOLUTION INTRAVENOUS; SUBCUTANEOUS at 21:25

## 2021-01-01 RX ADMIN — ATORVASTATIN CALCIUM 80 MG: 80 TABLET, FILM COATED ORAL at 21:25

## 2021-01-01 RX ADMIN — NYSTATIN: 100000 POWDER TOPICAL at 11:15

## 2021-01-01 RX ADMIN — METOPROLOL TARTRATE 25 MG: 25 TABLET, FILM COATED ORAL at 22:39

## 2021-01-01 RX ADMIN — ZINC OXIDE 1 APPLICATION: 200 OINTMENT TOPICAL at 13:24

## 2021-01-01 RX ADMIN — NYSTATIN: 100000 POWDER TOPICAL at 20:54

## 2021-01-01 RX ADMIN — MORPHINE SULFATE 4 MG: 4 INJECTION, SOLUTION INTRAMUSCULAR; INTRAVENOUS at 22:17

## 2021-01-01 RX ADMIN — FUROSEMIDE 40 MG: 10 INJECTION, SOLUTION INTRAMUSCULAR; INTRAVENOUS at 09:14

## 2021-01-01 RX ADMIN — NYSTATIN: 100000 POWDER TOPICAL at 09:35

## 2021-01-01 RX ADMIN — METOPROLOL TARTRATE 25 MG: 25 TABLET, FILM COATED ORAL at 08:32

## 2021-01-01 RX ADMIN — CASTOR OIL AND BALSAM, PERU 5 G: 788; 87 OINTMENT TOPICAL at 09:17

## 2021-01-01 RX ADMIN — ZINC OXIDE 1 APPLICATION: 200 OINTMENT TOPICAL at 17:37

## 2021-01-01 RX ADMIN — METOPROLOL TARTRATE 25 MG: 25 TABLET, FILM COATED ORAL at 20:49

## 2021-01-01 RX ADMIN — SODIUM CHLORIDE, PRESERVATIVE FREE 10 ML: 5 INJECTION INTRAVENOUS at 09:35

## 2021-01-01 RX ADMIN — METOPROLOL TARTRATE 25 MG: 25 TABLET, FILM COATED ORAL at 08:54

## 2021-01-01 RX ADMIN — CEFAZOLIN SODIUM 1 G: 1 INJECTION, SOLUTION INTRAVENOUS at 02:36

## 2021-01-01 RX ADMIN — ASPIRIN 81 MG: 81 TABLET, COATED ORAL at 09:36

## 2021-01-01 RX ADMIN — MORPHINE SULFATE 2 MG: 2 INJECTION, SOLUTION INTRAMUSCULAR; INTRAVENOUS at 19:51

## 2021-01-01 RX ADMIN — LORAZEPAM 0.5 MG: 0.5 TABLET ORAL at 16:21

## 2021-01-01 RX ADMIN — ZINC OXIDE 1 APPLICATION: 200 OINTMENT TOPICAL at 11:17

## 2021-01-01 RX ADMIN — ASPIRIN 81 MG: 81 TABLET, COATED ORAL at 08:54

## 2021-01-01 RX ADMIN — ZINC OXIDE 1 APPLICATION: 200 OINTMENT TOPICAL at 11:56

## 2021-01-01 RX ADMIN — HYDROCODONE BITARTRATE AND ACETAMINOPHEN 1 TABLET: 5; 325 TABLET ORAL at 20:49

## 2021-01-01 RX ADMIN — ZINC OXIDE 1 APPLICATION: 200 OINTMENT TOPICAL at 09:15

## 2021-01-01 RX ADMIN — HYDROCODONE BITARTRATE AND ACETAMINOPHEN 1 TABLET: 7.5; 325 TABLET ORAL at 09:16

## 2021-01-01 RX ADMIN — POLYETHYLENE GLYCOL 3350 17 G: 17 POWDER, FOR SOLUTION ORAL at 09:15

## 2021-01-01 RX ADMIN — ZINC OXIDE 1 APPLICATION: 200 OINTMENT TOPICAL at 08:55

## 2021-01-01 RX ADMIN — DOCUSATE SODIUM 50MG AND SENNOSIDES 8.6MG 2 TABLET: 8.6; 5 TABLET, FILM COATED ORAL at 20:45

## 2021-01-01 RX ADMIN — SODIUM CHLORIDE, PRESERVATIVE FREE 10 ML: 5 INJECTION INTRAVENOUS at 20:53

## 2021-01-01 RX ADMIN — ZINC OXIDE 1 APPLICATION: 200 OINTMENT TOPICAL at 11:34

## 2021-01-01 RX ADMIN — MORPHINE SULFATE 2 MG: 2 INJECTION, SOLUTION INTRAMUSCULAR; INTRAVENOUS at 01:15

## 2021-01-01 RX ADMIN — SODIUM CHLORIDE, PRESERVATIVE FREE 10 ML: 5 INJECTION INTRAVENOUS at 21:32

## 2021-01-01 RX ADMIN — LORAZEPAM 0.5 MG: 2 INJECTION INTRAMUSCULAR; INTRAVENOUS at 14:49

## 2021-01-01 RX ADMIN — SODIUM CHLORIDE, PRESERVATIVE FREE 10 ML: 5 INJECTION INTRAVENOUS at 08:55

## 2021-01-01 RX ADMIN — CEFAZOLIN SODIUM 1 G: 1 INJECTION, SOLUTION INTRAVENOUS at 17:37

## 2021-01-01 RX ADMIN — ZINC OXIDE 1 APPLICATION: 200 OINTMENT TOPICAL at 20:54

## 2021-01-01 RX ADMIN — ZINC OXIDE 1 APPLICATION: 200 OINTMENT TOPICAL at 08:59

## 2021-01-01 RX ADMIN — GADOBENATE DIMEGLUMINE 14 ML: 529 INJECTION, SOLUTION INTRAVENOUS at 14:09

## 2021-01-01 RX ADMIN — CASTOR OIL AND BALSAM, PERU 5 G: 788; 87 OINTMENT TOPICAL at 09:36

## 2021-01-01 RX ADMIN — CEFAZOLIN SODIUM 1 G: 1 INJECTION, SOLUTION INTRAVENOUS at 02:27

## 2021-01-01 RX ADMIN — NYSTATIN: 100000 POWDER TOPICAL at 17:02

## 2021-01-01 RX ADMIN — MAGNESIUM SULFATE HEPTAHYDRATE 4 G: 4 INJECTION, SOLUTION INTRAVENOUS at 10:42

## 2021-01-01 RX ADMIN — METOPROLOL TARTRATE 25 MG: 25 TABLET, FILM COATED ORAL at 09:36

## 2021-01-01 RX ADMIN — SODIUM CHLORIDE, PRESERVATIVE FREE 10 ML: 5 INJECTION INTRAVENOUS at 11:16

## 2021-01-01 RX ADMIN — LORAZEPAM 1 MG: 2 INJECTION INTRAMUSCULAR; INTRAVENOUS at 01:15

## 2021-01-01 RX ADMIN — METOPROLOL TARTRATE 25 MG: 25 TABLET, FILM COATED ORAL at 21:27

## 2021-01-01 RX ADMIN — MORPHINE SULFATE 2 MG: 2 INJECTION, SOLUTION INTRAMUSCULAR; INTRAVENOUS at 14:49

## 2021-01-01 RX ADMIN — NYSTATIN 1 APPLICATION: 100000 POWDER TOPICAL at 11:17

## 2021-01-01 RX ADMIN — DOCUSATE SODIUM 50MG AND SENNOSIDES 8.6MG 2 TABLET: 8.6; 5 TABLET, FILM COATED ORAL at 20:54

## 2021-01-01 RX ADMIN — HYDROCODONE BITARTRATE AND ACETAMINOPHEN 1 TABLET: 5; 325 TABLET ORAL at 21:18

## 2021-01-01 RX ADMIN — CASTOR OIL AND BALSAM, PERU 5 G: 788; 87 OINTMENT TOPICAL at 08:16
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
## 2021-07-10 PROBLEM — R53.1 WEAKNESS: Status: ACTIVE | Noted: 2021-01-01

## 2021-07-10 PROBLEM — R77.8 ELEVATED TROPONIN: Status: ACTIVE | Noted: 2021-01-01

## 2021-07-10 PROBLEM — E87.1 HYPONATREMIA: Status: ACTIVE | Noted: 2021-01-01

## 2021-07-10 PROBLEM — J18.9 PNEUMONIA: Status: ACTIVE | Noted: 2021-01-01

## 2021-07-10 PROBLEM — I50.9 CHF (CONGESTIVE HEART FAILURE) (HCC): Status: ACTIVE | Noted: 2021-01-01
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
## 2021-07-10 PROBLEM — R60.1 ANASARCA: Status: ACTIVE | Noted: 2021-01-01
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
## 2021-07-10 NOTE — ED TRIAGE NOTES
[facility] a mechanical [facility] - she tripped.  Hit her head.  No loc.  No blood thinners.  She has pitting edema and decreased uop x 2 months.  Pt was 88% on RA but is now on 4L o2 for sats 96%.  She had a tele appt and was dx c pneumonia and has been on sulfamethazole x 5 days    Patient was placed in face mask during first look triage.  Patient was wearing a face mask throughout encounter.  I wore personal protective equipment throughout the encounter.  Hand hygiene was performed before and after patient encounter.
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
## 2021-07-10 NOTE — ED PROVIDER NOTES
EMERGENCY DEPARTMENT ENCOUNTER    Room Number:  28/28  Date of encounter:  7/10/2021  PCP: Hank Saunders MD  Historian: Patient     I used full protective equipment while examining this patient.  This includes face mask, gloves and protective eyewear.  I washed my hands before entering the room and immediately upon leaving the room      HPI:  Chief Complaint: Leg swelling, shortness of breath  A complete HPI/ROS/PMH/PSH/SH/FH are unobtainable due to: None    Context: Erum Yang is a 87 y.o. female who presents to the ED c/o leg swelling, shortness of breath.  87-year-old female who lives at home with sister had a fall earlier today landing on her back.  She had no loss of consciousness and denies any increase of chronic back pain.  She is more concerned because she has become gradually more weak and has had increased lower extremity swelling and possible weight gain.  Patient denies any chest pain.  Patient thinks she thinks she might have congestive heart failure but has not had a diagnosis of same in the past.  She has had a history of chronic lower extremity edema which is worsened here over the last several weeks.      MEDICAL RECORD REVIEW  I reviewed prior medical records and note the patient is followed by Dr. Parrish for iron deficiency anemia.  Patient also has a history of edema and osteoarthritis.    PAST MEDICAL HISTORY  Active Ambulatory Problems     Diagnosis Date Noted   • Osteoarthritis of multiple joints 02/19/2019   • Hx of hepatitis 02/19/2019   • Venous insufficiency of right leg 05/23/2019   • Primary osteoarthritis of right hip 06/25/2019   • Status post right hip replacement 09/06/2019   • Iron deficiency anemia due to chronic blood loss 06/01/2020   • Iron malabsorption 07/15/2020     Resolved Ambulatory Problems     Diagnosis Date Noted   • No Resolved Ambulatory Problems     Past Medical History:   Diagnosis Date   • Anemia    • Arthritis    • Edema    • Heart murmur    • Hepatitis B  antibody positive    • Infectious viral hepatitis    • Pneumohemothorax    • Right hip pain    • Venous insufficiency          PAST SURGICAL HISTORY  Past Surgical History:   Procedure Laterality Date   • APPENDECTOMY     • CHEST TUBE INSERTION      RIGHT   • TOE SURGERY Right    • TONSILLECTOMY     • TOTAL HIP ARTHROPLASTY Right 2019    Procedure: TOTAL HIP ARTHROPLASTY;  Surgeon: David Archuleta MD;  Location: [facility] OR;  Service: Orthopedics         FAMILY HISTORY  Family History   Problem Relation Age of Onset   • Breast cancer Mother    • Heart attack Father    • Heart disease Father    • Heart disease Other    • Hypertension Other    • Uterine cancer Other    • Lung cancer Sister    • Stroke Maternal Aunt    • Breast cancer Maternal Aunt    • Cancer Sister         abdomen   • Malig Hyperthermia Neg Hx          SOCIAL HISTORY  Social History     Socioeconomic History   • Marital status: Single     Spouse name: Not on file   • Number of children: 0   • Years of education: 18   • Highest education level: Master's degree (e.g., MA, MS, [employer], MEd, MSW, MIHA[employer])   Tobacco Use   • Smoking status: Former Smoker     Packs/day: 1.00     Years: 10.00     Pack years: 10.00     Types: Cigarettes     Quit date: 1970     Years since quittin.8   • Smokeless tobacco: Never Used   • Tobacco comment: QUIT    Substance and Sexual Activity   • Alcohol use: No   • Drug use: No   • Sexual activity: Defer         ALLERGIES  Advil [ibuprofen] and Tylenol [acetaminophen]       REVIEW OF SYSTEMS  Review of Systems   Constitutional: Positive for fatigue and unexpected weight change (Unknown weight gain). Negative for fever.   HENT: Negative.  Negative for sore throat.    Eyes: Negative.    Respiratory: Positive for shortness of breath. Negative for cough.    Cardiovascular: Positive for leg swelling. Negative for chest pain.   Gastrointestinal: Negative.    Genitourinary: Negative.  Negative for dysuria.    Musculoskeletal: Positive for arthralgias (Chronic) and back pain (Chronic).   Skin: Negative.  Negative for rash.   Neurological: Positive for weakness. Negative for headaches.   All other systems reviewed and are negative.          PHYSICAL EXAM    I have reviewed the triage vital signs and nursing notes.    ED Triage Vitals   Temp Heart Rate Resp BP SpO2   07/10/21 1534 07/10/21 1533 07/10/21 1533 07/10/21 1533 07/10/21 1533   97.6 °F (36.4 °C) 96 16 137/80 97 %      Temp src Heart Rate Source Patient Position BP Location FiO2 (%)   07/10/21 1534 07/10/21 1533 -- -- --   Tympanic Monitor          Physical Exam  GENERAL: Alert female in no obvious distress.  Triage vitals are reviewed and are fairly unremarkable.  HENT: nares patent  EYES: no scleral icterus  CV: regular rhythm, regular rate-no murmur  RESPIRATORY: normal effort, mild bilateral basilar Rales  ABDOMEN: soft, morbidly obese without tenderness  MUSCULOSKELETAL: 3+ pitting edema in the bilateral lower extremities  NEURO: Strength reveals mild generalized weakness. sensation and coordination are grossly intact.  Speech and mentation are unremarkable  SKIN: warm, dry      LAB RESULTS  Recent Results (from the past 24 hour(s))   ECG 12 Lead    Collection Time: 07/10/21  4:08 PM   Result Value Ref Range    QT Interval 377 ms   Comprehensive Metabolic Panel    Collection Time: 07/10/21  4:18 PM    Specimen: Blood   Result Value Ref Range    Glucose 97 65 - 99 mg/dL    BUN 11 8 - 23 mg/dL    Creatinine 0.45 (L) 0.57 - 1.00 mg/dL    Sodium 123 (L) 136 - 145 mmol/L    Potassium 4.6 3.5 - 5.2 mmol/L    Chloride 86 (L) 98 - 107 mmol/L    CO2 25.8 22.0 - 29.0 mmol/L    Calcium 9.0 8.6 - 10.5 mg/dL    Total Protein 6.4 6.0 - 8.5 g/dL    Albumin 3.40 (L) 3.50 - 5.20 g/dL    ALT (SGPT) 31 1 - 33 U/L    AST (SGOT) 33 (H) 1 - 32 U/L    Alkaline Phosphatase 115 39 - 117 U/L    Total Bilirubin 0.4 0.0 - 1.2 mg/dL    eGFR Non African Amer 132 >60 mL/min/1.73     Globulin 3.0 gm/dL    A/G Ratio 1.1 g/dL    BUN/Creatinine Ratio 24.4 7.0 - 25.0    Anion Gap 11.2 5.0 - 15.0 mmol/L   Protime-INR    Collection Time: 07/10/21  4:18 PM    Specimen: Blood   Result Value Ref Range    Protime 13.4 11.7 - 14.2 Seconds    INR 1.04 0.90 - 1.10   BNP    Collection Time: 07/10/21  4:18 PM    Specimen: Blood   Result Value Ref Range    proBNP 9,124.0 (H) 0.0-1,800.0 pg/mL   Troponin    Collection Time: 07/10/21  4:18 PM    Specimen: Blood   Result Value Ref Range    Troponin T 0.171 (C) 0.000 - 0.030 ng/mL   CBC Auto Differential    Collection Time: 07/10/21  4:18 PM    Specimen: Blood   Result Value Ref Range    WBC 13.91 (H) 3.40 - 10.80 10*3/mm3    RBC 4.52 3.77 - 5.28 10*6/mm3    Hemoglobin 13.4 12.0 - 15.9 g/dL    Hematocrit 40.1 34.0 - 46.6 %    MCV 88.7 79.0 - 97.0 fL    MCH 29.6 26.6 - 33.0 pg    MCHC 33.4 31.5 - 35.7 g/dL    RDW 14.0 12.3 - 15.4 %    RDW-SD 45.0 37.0 - 54.0 fl    MPV 9.9 6.0 - 12.0 fL    Platelets 300 140 - 450 10*3/mm3    Neutrophil % 91.6 (H) 42.7 - 76.0 %    Lymphocyte % 2.9 (L) 19.6 - 45.3 %    Monocyte % 4.5 (L) 5.0 - 12.0 %    Eosinophil % 0.1 (L) 0.3 - 6.2 %    Basophil % 0.2 0.0 - 1.5 %    Immature Grans % 0.7 (H) 0.0 - 0.5 %    Neutrophils, Absolute 12.73 (H) 1.70 - 7.00 10*3/mm3    Lymphocytes, Absolute 0.41 (L) 0.70 - 3.10 10*3/mm3    Monocytes, Absolute 0.63 0.10 - 0.90 10*3/mm3    Eosinophils, Absolute 0.01 0.00 - 0.40 10*3/mm3    Basophils, Absolute 0.03 0.00 - 0.20 10*3/mm3    Immature Grans, Absolute 0.10 (H) 0.00 - 0.05 10*3/mm3    nRBC 0.0 0.0 - 0.2 /100 WBC       Ordered the above labs and independently reviewed the results.      RADIOLOGY  XR Chest 1 View    Result Date: 7/10/2021  SINGLE VIEW CHEST RADIOGRAPH  HISTORY: 87-year-old female with a history shortness of air.  FINDINGS: An upright AP portable chest radiograph was obtained. Comparison is made to a chest radiograph dated 08/15/2019. The lungs are reduced in volume and there is partial  silhouetting of the right and left hemidiaphragms with blunting of the left costophrenic angle. The cardiac silhouette is partially obscured. No evidence for a pneumothorax is appreciated. Osteopenia is noted.      There are low lung volumes with bibasilar atelectasis and/or pneumonia. A small left pleural effusion cannot be excluded.        I ordered the above noted radiological studies. Reviewed by me and discussed with radiologist.  See dictation for official radiology interpretation.      PROCEDURES  Procedures      MEDICATIONS GIVEN IN ER    Medications   furosemide (LASIX) injection 40 mg (40 mg Intravenous Given 7/10/21 1718)         PROGRESS, DATA ANALYSIS, CONSULTS, AND MEDICAL DECISION MAKING    All labs have been independently reviewed by me.  All radiology studies have been reviewed by me and discussed with radiologist dictating the report.   EKG's independently viewed and interpreted by me.  Discussion below represents my analysis of pertinent findings related to patient's condition, differential diagnosis, treatment plan and final disposition.      ED Course as of Jul 10 1725   Sat Jul 10, 2021   1629 EKG          EKG time: 1608  Rhythm/Rate: Sinus tachycardia 101  P waves and AL: Enlarged P waves, enlarged  QRS, axis: Unremarkable QRS, normal axis  ST and T waves: Lateral ST depression    Interpreted Contemporaneously by me, independently viewed  Lateral ST depression is new when compared to 2019     [DB]   1657 Labs are reviewed notable for signs of congestive heart failure with elevated BNP of 9000.  There is an elevated troponin of 0.171 which I do not feel represents acute myocardial infarction but rather troponin bump related to congestive heart failure.  Patient is not reporting any chest pain.  Patient also noted to have hyponatremia with sodium of 123.  I suspect that this is related to fluid overload and should improve with diuresis.    [DB]   1659 XR Chest 1 View [DB]   1700 Chest x-ray  reviewed with reading radiologist shows bilateral atelectasis or possible pneumonia.  I suspect given patient's clinical scenario that this is not pneumonia but more likely atelectasis with congestive heart failure.  At this point we will go ahead and give IV diuretics for volume overload and hyponatremia.  Will consult medicine about admission for further evaluation treatment.    [DB]   1723 I discussed evaluation of this patient with Dr. Noble who will admit to [facility].    [DB]   1724 I did discuss evaluation this patient with Dr. Noble.  He did asked that I add a procalcitonin.  With elevated white count and x-ray which shows possible pneumonia he may consider use of antibiotics if procalcitonin is elevated.  If procalcitonin is within normal limits x-ray findings are most likely related to patient's volume overload.    [DB]      ED Course User Index  [DB] Fidel Azar MD       AS OF 17:25 EDT VITALS:    BP - 152/89  HR - 101  TEMP - 97.6 °F (36.4 °C) (Tympanic)  O2 SATS - 97%      DIAGNOSIS  Final diagnoses:   Hyponatremia   Hypervolemia, unspecified hypervolemia type   Acute congestive heart failure, unspecified heart failure type (CMS/[facility])   Elevated troponin         DISPOSITION  Admission       Fidel Azar MD  07/10/21 1724       Fidel Azar MD  07/10/21 1725
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
## 2021-07-11 NOTE — PLAN OF CARE
Goal Outcome Evaluation:  Plan of Care Reviewed With: patient        Progress: no change  Outcome Summary: Dr Noble assessed pt at bedside with family present. CT of chest neg for PE, due to de-oxygenation now on 6lpm high flow NC for SAO2 above 90%.Cardiazem gtt started for elevateted HR, stopped at 0319 for sustained lower HR and hypotension. BP now Systolic over 100. Medciated x1 with tylenol for R calf pain. Turned as needed, call bell in reach. Will continue to monitor.

## 2021-07-11 NOTE — PLAN OF CARE
Problem: Adult Inpatient Plan of Care  Goal: Plan of Care Review  Outcome: Ongoing, Progressing  Flowsheets (Taken 7/11/2021 1728)  Progress: improving  Plan of Care Reviewed With: patient  Outcome Summary: Pt vitals stable. Echo and dopplers ordered. Up to chair today. IV lasix. Q 2 turn heels elevated/pt refused boots. Will continue to monitor   Goal Outcome Evaluation:  Plan of Care Reviewed With: patient        Progress: improving  Outcome Summary: Pt vitals stable. Echo and dopplers ordered. Up to chair today. IV lasix. Q 2 turn heels elevated/pt refused boots. Will continue to monitor

## 2021-07-11 NOTE — PLAN OF CARE
Goal Outcome Evaluation:  Plan of Care Reviewed With: patient, other (see comments) (2 nieces)           Outcome Summary: Pt is an 88 yo F who presented with BLE swelling and SOA. Pt had a recent fall resulting in increased LBP, also limiting her mobility. 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 Pt reports she knew she was declining, which is why she decided to come to the hospital. Pt reports use of a rollator at baseline, and only walks very short distances. Pt presents to PT with impaired strength and endurance. Pt required mod A x1 for bed mobility, min Ax1 for STS with rwx, and ambulated 20ft to the chair using a rwx requiring only CGA. Pt demonstrated increased forward flexed posture onto the walker, as well as decreased stride length and slow gait speed. Pt will continue to benefit from skilled PT to improve strength and endurance in order to progress gait distance and improve overall functional mobility. PT recommending D/C home with HHPT pending progress.    Patient was intermittently wearing a face mask during this therapy encounter. Therapist used appropriate personal protective equipment including eye protection, mask, and gloves.  Mask used was standard procedure mask. Appropriate PPE was worn during the entire therapy session. Hand hygiene was completed before and after therapy session. Patient is not in enhanced droplet precautions.

## 2021-07-11 NOTE — PROGRESS NOTES
Name: Erum Yang ADMIT: 7/10/2021   : 1933  PCP: Hank Saunders MD    MRN: [id number] LOS: 1 days   AGE/SEX: 87 y.o. female  ROOM: E4/   Subjective   Chief Complaint   Patient presents with   • Fall   • Edema      Difficulty breathing and short of breath when talking in complete symptoms. No CP or productive cough. No fever. Does have dyspnea on exertion and worse peripheral edema. She feels some improving edema and dyspnea with IV diuresis overnight. No NVD.    Objective   Vital Signs  Temp:  [97.5 °F (36.4 °C)-98.2 °F (36.8 °C)] 97.5 °F (36.4 °C)  Heart Rate:  [] 82  Resp:  [16-20] 20  BP: ()/(45-95) 107/60  SpO2:  [89 %-97 %] 97 %  on  Flow (L/min):  [3-6] 5;   Device (Oxygen Therapy): high-flow nasal cannula  Body mass index is 27 kg/m².    Physical Exam  Vitals and nursing note reviewed.   Constitutional:       General: She is in acute distress (mild respiratory).      Appearance: She is not diaphoretic.   HENT:      Head: Normocephalic and atraumatic.   Eyes:      Extraocular Movements: Extraocular movements intact.      Conjunctiva/sclera: Conjunctivae normal.   Cardiovascular:      Rate and Rhythm: Normal rate and regular rhythm.      Pulses: Normal pulses.   Pulmonary:      Breath sounds: Decreased breath sounds and rales present. No wheezing.   Abdominal:      General: There is no distension.      Palpations: Abdomen is soft.      Tenderness: There is no abdominal tenderness. There is no guarding or rebound.   Musculoskeletal:         General: No tenderness.      Cervical back: Neck supple. No tenderness.      Right lower leg: Edema present.      Left lower leg: Edema present.      Comments: 2+   Skin:     General: Skin is warm and dry.      Findings: Bruising present.   Neurological:      Mental Status: She is alert. Mental status is at baseline.   Psychiatric:         Mood and Affect: Mood normal.         Behavior: Behavior normal.         Results Review:       I  reviewed the patient's new clinical results.     I reviewed imaging, agree with interpretation.     I reviewed telemetry/EKG results, sinus tachycardia on ekg with twave changes      Results from last 7 days   Lab Units 07/11/21  0448 07/10/21  1618   WBC 10*3/mm3 12.86* 13.91*   HEMOGLOBIN g/dL 13.4 13.4   PLATELETS 10*3/mm3 259 300     Results from last 7 days   Lab Units 07/11/21  0448 07/10/21  1618   SODIUM mmol/L 122* 123*   POTASSIUM mmol/L 3.7 4.6   CHLORIDE mmol/L 85* 86*   CO2 mmol/L 25.0 25.8   BUN mg/dL 14 11   CREATININE mg/dL 0.50* 0.45*   GLUCOSE mg/dL 63* 97   Estimated Creatinine Clearance: 53.3 mL/min (A) (by C-G formula based on SCr of 0.5 mg/dL (L)).  Results from last 7 days   Lab Units 07/11/21  0448 07/10/21  1618   CALCIUM mg/dL 8.4* 9.0   ALBUMIN g/dL 2.90* 3.40*     Results from last 7 days   Lab Units 07/10/21  1618   INR  1.04        enoxaparin, 1 mg/kg, Subcutaneous, Q12H  furosemide, 40 mg, Intravenous, BID  metoprolol tartrate, 25 mg, Oral, Q12H  sodium chloride, 10 mL, Intravenous, Q12H      dilTIAZem, 5-15 mg/hr, Last Rate: Stopped (07/11/21 0319)    Diet Regular; Cardiac    Assessment/Plan      Active Hospital Problems    Diagnosis  POA   • Hyponatremia [E87.1]  Yes   • CHF (congestive heart failure) (CMS/HCC) [I50.9]  Unknown   • Pneumonia [J18.9]  Unknown   • Anasarca [R60.1]  Unknown   • Elevated troponin [R77.8]  Unknown   • Weakness [R53.1]  Unknown   • Fall [W19.XXXA]  Unknown      Resolved Hospital Problems   No resolved problems to display.       · Acute Heart Failure/AFib RVR: Continue IV diuresis and she has been weaned off diltiazem drip and started on metoprolol. Troponin elevated but not having active chest pain. DDimer is very elevated so has been started on lovenox. No PE on CTA chest so will check duplex and cardiac echo. Check TSH and lipids. Start ASA/Statin. Cardiology consulted.  · Hyponatremia: Volume overloaded. Monitor with diuresis.  · Bibasilar  Infiltrates/trace pleural effusions: Procal is not elevated and no fever or productive cough. She does have leukocytosis which is improving off of antibiotics. Would favor atelectasis at this point and not pneumonia. Will monitor and am not against antibiotics if infectious symptoms develop.  · Weakness/Fall: CHF/AFib  · Disposition: TBD    Abhi Hernandez MD  [facility]  07/11/21  12:17 EDT    Dictated portions using Dragon dictation software.    During the entire encounter, I was wearing recommended PPE including face mask and eye protection. Hand sanitization was performed prior to entering room and upon exit.
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
## 2021-07-12 NOTE — PROGRESS NOTES
LOS: 2 days   Patient Care Team:  Hank Saunders MD as PCP - General (Family Medicine)  Shawn Parrish MD as Consulting Physician (Hematology and Oncology)  Hank Saunders MD as Referring Physician (Family Medicine)    Chief Complaint:     Follow-up CHF    Interval History:     She remains hypoxic this time.  She is short winded and feels very weak.  She is unable to transfer.  Her right leg below the knee really hurts and she has a large tender area there that looks like it could be hematoma.    Echo and le venous studies today.  Review of CTA images shows proximal RCA to be patent, left main patent, proximal LAD patent.  Calcification noted in mid LAD and also noted in the proximal RCA.  Calcifications scattered throughout the ascending aorta, aortic arch and descending aorta.    Objective   Vital Signs  Temp:  [97.8 °F (36.6 °C)-98.5 °F (36.9 °C)] 98 °F (36.7 °C)  Heart Rate:  [] 115  Resp:  [18] 18  BP: ()/(51-73) 117/73    Intake/Output Summary (Last 24 hours) at 7/12/2021 0857  Last data filed at 7/12/2021 0750  Gross per 24 hour   Intake 60 ml   Output 1275 ml   Net -1215 ml       Comfortable NAD  Neck supple, no JVD or thyromegaly appreciated  S1/S2 irregular no m/r/g  Lungs decreased mid to base on the right and base on left, normal effort  Abdomen S/NT/ND (+) BS, no HSM appreciated  Extremities warm, no clubbing, cyanosis, or edema  Large lesion on the lateral portion of the right leg below the knee, blood collection with bruising noted  A/Ox4, mood and affect appropriate    Results Review:      Results from last 7 days   Lab Units 07/12/21  0403 07/11/21  0448 07/10/21  1618   SODIUM mmol/L 125* 122* 123*   POTASSIUM mmol/L 3.6 3.7 4.6   CHLORIDE mmol/L 88* 85* 86*   CO2 mmol/L 28.8 25.0 25.8   BUN mg/dL 22 14 11   CREATININE mg/dL 0.49* 0.50* 0.45*   GLUCOSE mg/dL 84 63* 97   CALCIUM mg/dL 7.9* 8.4* 9.0     Results from last 7 days   Lab Units 07/11/21  0448 07/10/21  1804    TROPONIN T ng/mL 0.139* 0.171*     Results from last 7 days   Lab Units 07/12/21  0403 07/11/21  0448 07/10/21  1618   WBC 10*3/mm3 8.11 12.86* 13.91*   HEMOGLOBIN g/dL 11.7* 13.4 13.4   HEMATOCRIT % 34.9 40.4 40.1   PLATELETS 10*3/mm3 234 259 300     Results from last 7 days   Lab Units 07/10/21  1618   INR  1.04     Results from last 7 days   Lab Units 07/12/21  0403   CHOLESTEROL mg/dL 137     Results from last 7 days   Lab Units 07/12/21  0403   MAGNESIUM mg/dL 1.9     Results from last 7 days   Lab Units 07/12/21  0403   CHOLESTEROL mg/dL 137   TRIGLYCERIDES mg/dL 47   HDL CHOL mg/dL 67*   LDL CHOL mg/dL 59       I reviewed the patient's new clinical results.  I personally viewed and interpreted the patient's EKG/Telemetry data        Medication Review:   aspirin, 81 mg, Oral, Daily  atorvastatin, 80 mg, Oral, Nightly  enoxaparin, 1 mg/kg, Subcutaneous, Q12H  furosemide, 40 mg, Intravenous, BID  metoprolol tartrate, 25 mg, Oral, Q12H  nystatin, , Topical, Q12H  sodium chloride, 10 mL, Intravenous, Q12H        dilTIAZem, 5-15 mg/hr, Last Rate: Stopped (07/11/21 0319)        Assessment/Plan       Hyponatremia    CHF (congestive heart failure) (CMS/[facility])    Pneumonia    Anasarca    Elevated troponin    Weakness    Fall    1. Congestive heart failure -patient is clearly volume overloaded with abnormal EKG and elevated troponin.  Echocardiogram ordered and stress-induced cardiomyopathy would not surprise me although I cannot exclude ischemic etiology at this point in time.  Patient denies angina.  Troponin downtrending.  No evidence for atrial arrhythmia at this time.  No PE or aortic pathology on CT.  If Dopplers of the extremities are negative would DC Lovenox.    Set up 2-week telemetry at discharge.  We will continue IV diuretics.  Blood pressure is better controlled since admission in fact somewhat low.  2. Palpitations -cannot exclude arrhythmia at this time.  Monitor telemetry.  No indication for  anticoagulation at this time.  Continue beta-blocker.  3. Chronic anemia -followed by hematology for iron deficiency anemia getting occasional IV Venofer infusions.  Watch hemoglobin.  4. Hyponatremia. 1500cc fluid restrictions.       I will take a look at the echocardiographic images.  Her venous duplex is normal, will stop Lovenox.,  Heart rate mostly controlled, blood pressure is controlled.  Will follow.  Continue IV diuresis for now.      Lynn Rice MD  07/12/21  08:57 EDT

## 2021-07-12 NOTE — DISCHARGE PLACEMENT REQUEST
"Jean Paul Gutierrez FRANCISCA (87 y.o. Female)     Date of Birth Social Security Number Address Home Phone MRN    12/12/1933  [address]  [facility] [id number] [phone] [id number]    Confucianism Marital Status          Mormonism Single       Admission Date Admission Type Admitting Provider Attending Provider Department, Room/Bed    7/10/21 Emergency Chitra Noble MD Baumann, Patrick D, MD [address], E463/1    Discharge Date Discharge Disposition Discharge Destination                       Attending Provider: Abhi Hernandez MD    Allergies: Advil [Ibuprofen]    Isolation: None   Infection: None   Code Status: CPR    Ht: 170.2 cm (67\")   Wt: 76.2 kg (168 lb)    Admission Cmt: None   Principal Problem: None                Active Insurance as of 7/10/2021     Primary Coverage     Payor Plan Insurance Group Employer/Plan Group    MEDICARE MEDICARE A & B      Payor Plan Address Payor Plan Phone Number Payor Plan Fax Number Effective Dates    [address] [phone]  12/1/1998 - None Entered    [facility] [id number]       Subscriber Name Subscriber Birth Date Member ID       JEAN PAUL GUTIERREZ 12/12/1933 [id number]           Secondary Coverage     Payor Plan Insurance Group Employer/Plan Group    [id number]     Payor Plan Address Payor Plan Phone Number Payor Plan Fax Number Effective Dates    [address]   1/14/1990 - None Entered    [facility] [id number]       Subscriber Name Subscriber Birth Date Member ID       JEAN PAUL GUTIERREZ 12/12/1933 [id number]                 Emergency Contacts      (Rel.) Home Phone Work Phone Mobile Phone    CORAZON GUTIERREZ (Sister) -- -- [phone]    (nephew), Jaren (Other) -- -- [phone]              "

## 2021-07-12 NOTE — PLAN OF CARE
Goal Outcome Evaluation:  Plan of Care Reviewed With: patient    Patient remains alert and oriented x4.  VSS at this time.  Turned and repositioned every 2 hours and prn per staff.  Pure Wick in place and draining to wall suction.  O2 saturations remains in mid 90's on 3L/NC HiFlo.  No s/s of distress noted at this time.  All safety measures in place.  Will continue to monitor.

## 2021-07-12 NOTE — CASE MANAGEMENT/SOCIAL WORK
Discharge Planning Assessment  [facility]     Patient Name: Erum Yang  MRN: [id number]  Today's Date: 7/12/2021    Admit Date: 7/10/2021    Discharge Needs Assessment     Row Name 07/12/21 1237       Living Environment    Lives With  sibling(s)    Current Living Arrangements  home/apartment/condo    Potentially Unsafe Housing Conditions  unable to assess    Primary Care Provided by  self    Provides Primary Care For  no one, unable/limited ability to care for self    Family Caregiver if Needed  none    Quality of Family Relationships  unable to assess       Resource/Environmental Concerns    Resource/Environmental Concerns  none    Transportation Concerns  car, none       Transition Planning    Patient/Family Anticipates Transition to  home with family    Patient/Family Anticipated Services at Transition  home health care;skilled nursing;rehabilitation services    Transportation Anticipated  family or friend will provide       Discharge Needs Assessment    Equipment Currently Used at Home  walker, rolling;shower chair    Concerns to be Addressed  no discharge needs identified    Equipment Needed After Discharge  none    Provided Post Acute Provider List?  N/A    Provided Post Acute Provider Quality & Resource List?  N/A        Discharge Plan     Row Name 07/12/21 1717       Plan    Plan  Discharge home vs SNF.    Provided Post Acute Provider List?  N/A    Patient/Family in Agreement with Plan  unable to assess    Plan Comments  CCP spoke with patient & nephew (Jaren Tidwell) at the bedside, introduced self & role, patient gave verbal permission to discuss information with nephew (Jaren Tidwell) present. CCP verified address, PCP & pharmacy ([facility]) denies issues obtaining & paying for prescriptions. Patient can perform medication management, bathe/groom self however sister (Elif Yang) must help to prepare meals. Patient does not drive, nephews will transports patient to all appointments & can  provide transportation at discharge. Patient lives with sister in a single story home with one step to enter/exit the home & denies issues entering or exiting the home. DME currently in use by the patient is a walker & shower chair. Patient has used [facility] in the past & prefers to use [facility] at discharge if home health is needed. Patient denies past use of SNF or home oxygen provider and does not have preference of SNF or home oxygen provider. Nephew (Jaren Tidwell) can provide transportation at discharge, nephew states his cell phone number [phone]. CCP to follow. Teetee Gonsalves RN.        Continued Care and Services - Admitted Since 7/10/2021    Coordination has not been started for this encounter.         Demographic Summary     Row Name 07/12/21 1237       General Information    Admission Type  inpatient    Arrived From  emergency department    Referral Source  admission list    Reason for Consult  discharge planning    Preferred Language  English     Used During This Interaction  no        Functional Status     Row Name 07/12/21 1237       Functional Status    Usual Activity Tolerance  poor    Current Activity Tolerance  poor       Functional Status, IADL    Medications  independent    Meal Preparation  assistive person    Housekeeping  assistive person    Laundry  assistive person    Shopping  assistive person        Psychosocial    No documentation.       Abuse/Neglect    No documentation.       Legal    No documentation.       Substance Abuse    No documentation.       Patient Forms    No documentation.           Joanie Gonsalves RN

## 2021-07-12 NOTE — NURSING NOTE
Paged Dr. Givens, Cardiology regarding patient going into AFIB and O2 Sats in low 90's on 7L/NC HI SERGIO.  Gloria cardiology rounding nurse returned call and no new orders were given.  She stated to just give morning Metoprolol and the Cardiologist will see the patient this a.m.

## 2021-07-12 NOTE — NURSING NOTE
CWON note: pt seen for evaluation of skin issues POA. Pt is refusing a low air loss mattress, but is agreeable to have the Accumax mattress pump placed.  Omari score 16, pt is incontinent of urine and stool.     Assessment: Gluteal cleft/ buttocks with light pink confluent maculopapular rash secondary to IAD with yeast component. Will add Magic Barrier Cream to alternate with Z Guard cream 4x day. Shiv heels are boggy with slowly blanchable erythema, will add Venelex and keep heels off-loaded at all times. Left elbow with pink partial thickness skin tear, complete loss of skin flap, open 1x 1cm with scant serous drainage. Skin tear orders placed in Epic. Rt Lower leg with 8x 8cms hematoma present secondary to fall at home. Would suggest surgical evaluation and possible need for evacuation.     Plan: Prevention standing orders placed  Venelex ointment with heels off-loaded  Magic barrier cream alternated with Z Guard cream to rash on buttocks.   Wound care orders for skin tear  Pt is refusing low air loss mattress at this time.

## 2021-07-12 NOTE — PROGRESS NOTES
Name: Erum Yang ADMIT: 7/10/2021   : 1933  PCP: Hank Saunders MD    MRN: [id number] LOS: 2 days   AGE/SEX: 87 y.o. female  ROOM: E4/   Subjective   Chief Complaint   Patient presents with   • Fall   • Edema      Tachycardia earlier this morning/overnight to 140s with worsening SOA associated. She was down for ultrasound and echo and had since returned. Heart rate was in 60s during my exam. She was on 7L but saturating 97%. I turned her down to 6L. She denied chest pain. Dyspnea present at rest and is worse with any movement or speech. No productive cough or fever. No NVD.    Objective   Vital Signs  Temp:  [97.8 °F (36.6 °C)-98.5 °F (36.9 °C)] 98 °F (36.7 °C)  Heart Rate:  [] 115  Resp:  [18] 18  BP: ()/(51-73) 102/65  SpO2:  [92 %-98 %] 94 %  on  Flow (L/min):  [3-7] 7;   Device (Oxygen Therapy): high-flow nasal cannula  Body mass index is 26.31 kg/m².    Physical Exam  Vitals and nursing note reviewed.   Constitutional:       General: She is in acute distress (mild respiratory).      Appearance: She is not diaphoretic.   HENT:      Head: Normocephalic and atraumatic.   Eyes:      Extraocular Movements: Extraocular movements intact.      Conjunctiva/sclera: Conjunctivae normal.   Cardiovascular:      Rate and Rhythm: Normal rate. Rhythm irregular.      Pulses: Normal pulses.   Pulmonary:      Breath sounds: Decreased breath sounds present. No wheezing.   Abdominal:      General: There is no distension.      Palpations: Abdomen is soft.      Tenderness: There is no abdominal tenderness. There is no guarding or rebound.   Musculoskeletal:         General: Tenderness present.      Cervical back: Neck supple. No tenderness.      Right lower leg: Edema present.      Left lower leg: Edema present.      Comments: 1-2+   Skin:     General: Skin is warm and dry.      Findings: Bruising (hematoma right calf) present.   Neurological:      Mental Status: She is alert. Mental status is at  baseline.   Psychiatric:         Mood and Affect: Mood normal.         Behavior: Behavior normal.         Results Review:       I reviewed the patient's new clinical results.     I reviewed imaging, agree with interpretation.     I reviewed telemetry/EKG results, sinus rhythm during exam      Results from last 7 days   Lab Units 07/12/21  0403 07/11/21  0448 07/10/21  1618   WBC 10*3/mm3 8.11 12.86* 13.91*   HEMOGLOBIN g/dL 11.7* 13.4 13.4   PLATELETS 10*3/mm3 234 259 300     Results from last 7 days   Lab Units 07/12/21  0403 07/11/21  0448 07/10/21  1618   SODIUM mmol/L 125* 122* 123*   POTASSIUM mmol/L 3.6 3.7 4.6   CHLORIDE mmol/L 88* 85* 86*   CO2 mmol/L 28.8 25.0 25.8   BUN mg/dL 22 14 11   CREATININE mg/dL 0.49* 0.50* 0.45*   GLUCOSE mg/dL 84 63* 97   Estimated Creatinine Clearance: 52.7 mL/min (A) (by C-G formula based on SCr of 0.49 mg/dL (L)).  Results from last 7 days   Lab Units 07/12/21  0403 07/11/21  0448 07/10/21  1618   CALCIUM mg/dL 7.9* 8.4* 9.0   ALBUMIN g/dL  --  2.90* 3.40*   MAGNESIUM mg/dL 1.9  --   --      Results from last 7 days   Lab Units 07/10/21  1618   INR  1.04        aspirin, 81 mg, Oral, Daily  atorvastatin, 80 mg, Oral, Nightly  furosemide, 40 mg, Intravenous, BID  metoprolol tartrate, 25 mg, Oral, Q12H  nystatin, , Topical, Q12H  sodium chloride, 10 mL, Intravenous, Q12H      dilTIAZem, 5-15 mg/hr, Last Rate: Stopped (07/11/21 0319)    Diet Regular; Cardiac    Assessment/Plan      Active Hospital Problems    Diagnosis  POA   • Hyponatremia [E87.1]  Yes   • CHF (congestive heart failure) (CMS/HCC) [I50.9]  Unknown   • Pneumonia [J18.9]  Unknown   • Anasarca [R60.1]  Unknown   • Elevated troponin [R77.8]  Unknown   • Weakness [R53.1]  Unknown   • Fall [W19.XXXA]  Unknown      Resolved Hospital Problems   No resolved problems to display.       · Acute Heart Failure/Tachycardia: Continue IV diuresis. She had worsening of hypoxia overnight related to tachycardia and volume overload.  Peripheral edema is improving. Heart rate was better during my exam. Echo pending. I had started statin/asa yesterday. LDL is low so can titrate down if cariology would like. Cardiology following.  · Hyponatremia: Volume overloaded. Monitor with diuresis.  · AHRF: 6 L requirement now. Not on home O2 and dyspneic/speaking in shortened senteces.  · Right Calf Hematoma/Elevated DDimer: DDimer>20. No PE on CTA and no DVT on Duplex. No documented afib per cardiology so now off AC. Would repeat DDimer as outpatient.  · Bibasilar Infiltrates/trace pleural effusions: Repeat procal negative. Monitoring for infectious signs.  · Weakness/Fall: CHF  · Disposition: TBD    Abhi Hernandez MD  [facility]  07/12/21  13:10 EDT    Dictated portions using Dragon dictation software.    During the entire encounter, I was wearing recommended PPE including face mask and eye protection. Hand sanitization was performed prior to entering room and upon exit.

## 2021-07-12 NOTE — PLAN OF CARE
Goal Outcome Evaluation:  Plan of Care Reviewed With: patient        Progress: no change  Outcome Summary: Pt c/o some soa and pain to RLE. Hematoma noted to right outer calf, larger in size from admission photo and per family. Lovenox discontinues and neg for PE and DVT. Continues on IV lasix. Surgery consulted for RLE hematoma, no interventions at this time. Abrasion to left elbow, heels elevated-look better from pictures, alternating z-guard and magic barrier cream to buttocks/coccyx. Educated on turning [id number] to prevent further breakdown. PRN colace given as patient with no BM since admission. Family at bedside.

## 2021-07-13 NOTE — CASE MANAGEMENT/SOCIAL WORK
Continued Stay Note  [facility]     Patient Name: Erum Yang  MRN: [id number]  Today's Date: 7/13/2021    Admit Date: 7/10/2021    Discharge Plan     Row Name 07/13/21 0941       Plan    Plan  Home with sister and [facility]  for Nursing/PT/OT.  Nephew to transport at D/C. Follow for possible home O2 need.    Patient/Family in Agreement with Plan  yes    Plan Comments  Pt to have evacuation RLE hematoma tomorrow. [facility] will see at D/C. Last seen by PT 7/11. Will need to follow up with PT for possible SNF need vs home with HH. O2 weaned to 3LNC today. Chang Johnson RN-BC        Discharge Codes    No documentation.             Chang Johnson, RN

## 2021-07-13 NOTE — PROGRESS NOTES
Chief Complaint:    Right lower leg hematoma    Subjective:    The patient continues to have pain in the right lower extremity at the site of the hematoma.  There has been no drainage.    Objective:    Temp:  [97.7 °F (36.5 °C)-98.4 °F (36.9 °C)] 98.4 °F (36.9 °C)  Heart Rate:  [64-93] 93  Resp:  [16-18] 18  BP: (102-110)/(46-74) 107/58    Physical Exam  Constitutional:       General: She is awake.      Appearance: She is ill-appearing. She is not toxic-appearing.   Skin:     Comments: There is a large hematoma of the posterior aspect of the right lower leg with discoloration of the skin and mild surrounding erythema.   Psychiatric:         Behavior: Behavior is cooperative.         Results:    WBC is 9.07.  H/H is 11.8/36.4.    Assessment/Plan:    The patient has a large subcutaneous hematoma of the right lower extremity that is causing distortion of the skin and some mild erythema.  There is no overt signs of infection but the process is going to be very slow to resolve.  We will tentatively plan to proceed with an evacuation of the hematoma tomorrow to try to speed up the healing process.  This was discussed with the patient who is reluctant to have surgery but willing to proceed tomorrow if it has not significantly improved.    Rober Carranza Jr., M.D.

## 2021-07-13 NOTE — PROGRESS NOTES
Hospital Follow Up    LOS:  LOS: 3 days   Patient Name: Erum Yang  Age/Sex: 87 y.o. female  : 1933  MRN: [id number]    Day of Service: 21   Length of Stay: 3  Encounter Provider: RILEY Park  Place of Service: [facility]  Patient Care Team:  Hank Saunders MD as PCP - General (Family Medicine)  Shawn Parrish MD as Consulting Physician (Hematology and Oncology)  Hank Saunders MD as Referring Physician (Family Medicine)    Subjective:     Chief Complaint: Shortness of air status post fall    Interval History: Nervous about surgery tomorrow    Objective:     Objective:  Temp:  [97.7 °F (36.5 °C)-98.4 °F (36.9 °C)] 98.4 °F (36.9 °C)  Heart Rate:  [64-93] 93  Resp:  [16-18] 18  BP: (105-110)/(46-74) 107/58     Intake/Output Summary (Last 24 hours) at 2021 1113  Last data filed at 2021 0808  Gross per 24 hour   Intake 450 ml   Output 2050 ml   Net -1600 ml     Body mass index is 26.31 kg/m².      07/10/21  1730 21  0402 21  1019   Weight: 78.2 kg (172 lb 6.4 oz) 76.3 kg (168 lb 3.4 oz) 76.2 kg (168 lb)     Weight change: -0.096 kg (-3.4 oz)    Physical Exam:   General Appearance:    Awake alert and oriented in no acute distress.   Color:  Skin:  Neuro:  HEENT:    Lungs:     Pink  Warm and dry  No focal, motor or sensory deficits  Neck supple, pupils equal, round and reactive.  Positive JVD, No Bruit  Decreased air entry to auscultation,respirations regular, even and                  unlabored    Heart:    Regular rate and rhythm, S1 and S2, no murmur, no gallop, no rub. No edema, DP/PT pulses are 2+   Chest Wall:    No abnormalities observed   Abdomen:     Normal bowel sounds, no masses, no organomegaly, soft        non-tender, non-distended, no guarding, no ascites noted   Extremities:   Moves all extremities well, no edema, no cyanosis, no redness.  Right lower extremity with erythema and bruising from mid calf  to ankle       Lab Review:   Results from last 7 days   Lab Units 07/13/21  0324 07/12/21  0403 07/11/21  0448 07/10/21  1618   SODIUM mmol/L 132* 125* 122* 123*   POTASSIUM mmol/L 3.2* 3.6 3.7 4.6   CHLORIDE mmol/L 85* 88* 85* 86*   CO2 mmol/L 34.6* 28.8 25.0 25.8   BUN mg/dL 19 22 14 11   CREATININE mg/dL 0.41* 0.49* 0.50* 0.45*   GLUCOSE mg/dL 95 84 63* 97   CALCIUM mg/dL 7.7* 7.9* 8.4* 9.0   AST (SGOT) U/L  --   --  27 33*   ALT (SGPT) U/L  --   --  26 31     Results from last 7 days   Lab Units 07/11/21  0448 07/10/21  1618   TROPONIN T ng/mL 0.139* 0.171*     Results from last 7 days   Lab Units 07/13/21  0324 07/12/21  0403   WBC 10*3/mm3 9.07 8.11   HEMOGLOBIN g/dL 11.8* 11.7*   HEMATOCRIT % 36.4 34.9   PLATELETS 10*3/mm3 233 234     Results from last 7 days   Lab Units 07/10/21  1618   INR  1.04     Results from last 7 days   Lab Units 07/13/21  0324 07/12/21  0403   MAGNESIUM mg/dL 1.8 1.9     Results from last 7 days   Lab Units 07/12/21  0403   CHOLESTEROL mg/dL 137   TRIGLYCERIDES mg/dL 47   HDL CHOL mg/dL 67*     Results from last 7 days   Lab Units 07/10/21  1618   PROBNP pg/mL 9,124.0*     Results from last 7 days   Lab Units 07/12/21  0403   TSH uIU/mL 0.969     I reviewed the patient's new clinical results.  I personally viewed and interpreted the patient's EKG  Current Medications:   Scheduled Meds:aspirin, 81 mg, Oral, Daily  atorvastatin, 80 mg, Oral, Nightly  castor oil-balsam peru, , Topical, Q12H  furosemide, 40 mg, Intravenous, BID  hydrocortisone-bacitracin-zinc oxide-nystatin, 1 application, Topical, 4x Daily  metoprolol tartrate, 25 mg, Oral, Q12H  nystatin, , Topical, Q12H  senna-docusate sodium, 2 tablet, Oral, BID  sodium chloride, 10 mL, Intravenous, Q12H      Continuous Infusions:     Allergies:  Allergies   Allergen Reactions   • Advil [Ibuprofen] Swelling       Assessment:       Hyponatremia    CHF (congestive heart failure) (CMS/HCC)    Pneumonia    Anasarca    Elevated troponin     Weakness    Fall    1.  Acute diastolic CHF  2.  Palpitations in sinus rhythm on monitor  3.  Chronic anemia with iron deficiency  4.  Hyponatremia    Plan:      Echocardiogram shows some diastolic dysfunction with EF of 57% mild LVH.  Volume status appears to be improving.  Surgery notes that they would like to evacuate hematoma tomorrow however she is reluctant to do so.  Will replace low potassium    RILEY Park  07/13/21  11:13 EDT  Electronically signed by RILEY Park, 07/13/21, 11:13 AM EDT.

## 2021-07-13 NOTE — PROGRESS NOTES
Name: Erum Yang ADMIT: 7/10/2021   : 1933  PCP: Hank Saunders MD    MRN: [id number] LOS: 3 days   AGE/SEX: 87 y.o. female  ROOM: [facility]/   Subjective   Chief Complaint   Patient presents with   • Fall   • Edema      Tachycardia earlier this morning/overnight to 140s with worsening SOA associated. She was down for ultrasound and echo and had since returned. Heart rate was in 60s during my exam. She was on 7L but saturating 97%. I turned her down to 6L. She denied chest pain. Dyspnea present at rest and is worse with any movement or speech. No productive cough or fever. No NVD.    Objective   Vital Signs  Temp:  [97.7 °F (36.5 °C)-98.4 °F (36.9 °C)] 98.4 °F (36.9 °C)  Heart Rate:  [64-93] 93  Resp:  [16-18] 18  BP: (105-110)/(46-74) 107/58  SpO2:  [92 %-96 %] 96 %  on  Flow (L/min):  [3-7] 3;   Device (Oxygen Therapy): nasal cannula;humidified  Body mass index is 26.31 kg/m².    Physical Exam  Vitals and nursing note reviewed.   Constitutional:       General: She is not in acute distress.     Appearance: She is not diaphoretic.   HENT:      Head: Normocephalic and atraumatic.   Eyes:      Extraocular Movements: Extraocular movements intact.      Conjunctiva/sclera: Conjunctivae normal.   Cardiovascular:      Rate and Rhythm: Normal rate and regular rhythm.      Pulses: Normal pulses.   Pulmonary:      Effort: Pulmonary effort is normal.      Breath sounds: Decreased breath sounds present. No wheezing.   Abdominal:      General: There is no distension.      Palpations: Abdomen is soft.      Tenderness: There is no abdominal tenderness. There is no guarding or rebound.   Musculoskeletal:         General: Tenderness present.      Cervical back: Neck supple. No tenderness.      Right lower leg: Edema present.      Left lower leg: Edema present.      Comments: 1-2+   Skin:     General: Skin is warm and dry.      Findings: Bruising (hematoma right calf) present.   Neurological:      Mental Status: She is  alert. Mental status is at baseline.   Psychiatric:         Mood and Affect: Mood normal.         Behavior: Behavior normal.         Results Review:       I reviewed the patient's new clinical results.     I reviewed other test results, agree with interpretation.     I reviewed telemetry/EKG results, sinus rhythm during exam      Results from last 7 days   Lab Units 07/13/21  0324 07/12/21  0403 07/11/21  0448 07/10/21  1618   WBC 10*3/mm3 9.07 8.11 12.86* 13.91*   HEMOGLOBIN g/dL 11.8* 11.7* 13.4 13.4   PLATELETS 10*3/mm3 233 234 259 300     Results from last 7 days   Lab Units 07/13/21 0324 07/12/21  0403 07/11/21  0448 07/10/21  1618   SODIUM mmol/L 132* 125* 122* 123*   POTASSIUM mmol/L 3.2* 3.6 3.7 4.6   CHLORIDE mmol/L 85* 88* 85* 86*   CO2 mmol/L 34.6* 28.8 25.0 25.8   BUN mg/dL 19 22 14 11   CREATININE mg/dL 0.41* 0.49* 0.50* 0.45*   GLUCOSE mg/dL 95 84 63* 97   Estimated Creatinine Clearance: 52.7 mL/min (A) (by C-G formula based on SCr of 0.41 mg/dL (L)).  Results from last 7 days   Lab Units 07/13/21  0324 07/12/21  0403 07/11/21  0448 07/10/21  1618   CALCIUM mg/dL 7.7* 7.9* 8.4* 9.0   ALBUMIN g/dL  --   --  2.90* 3.40*   MAGNESIUM mg/dL 1.8 1.9  --   --      Results from last 7 days   Lab Units 07/10/21  1618   INR  1.04        aspirin, 81 mg, Oral, Daily  atorvastatin, 80 mg, Oral, Nightly  castor oil-balsam peru, , Topical, Q12H  furosemide, 40 mg, Intravenous, BID  hydrocortisone-bacitracin-zinc oxide-nystatin, 1 application, Topical, 4x Daily  metoprolol tartrate, 25 mg, Oral, Q12H  nystatin, , Topical, Q12H  senna-docusate sodium, 2 tablet, Oral, BID  sodium chloride, 10 mL, Intravenous, Q12H       Diet Regular; Cardiac  NPO Diet    Assessment/Plan      Active Hospital Problems    Diagnosis  POA   • Hyponatremia [E87.1]  Yes   • CHF (congestive heart failure) (CMS/HCC) [I50.9]  Unknown   • Pneumonia [J18.9]  Unknown   • Anasarca [R60.1]  Unknown   • Elevated troponin [R77.8]  Unknown   • Weakness  [R53.1]  Unknown   • Fall [W19.XXXA]  Unknown      Resolved Hospital Problems   No resolved problems to display.       · Acute Diastolic Heart Failure/Palpitations: Preserved EF on Echo. Only grade 1 diastolic dysfunction. Possibly tachycardia mediated. Zio at [place] is planned to determine if having afib outpatient and degree of tachycardia burden. Rate is improved this morning. Continue IV diuresis. Cardiology following.  · Hyponatremia: Volume overloaded. Monitor with diuresis.  · AHRF: Improvin O2 req with diuresis.  · Elevated DDimer: DDimer>20. No PE on CTA and no DVT on Duplex. No documented afib per cardiology so now off AC. Would repeat DDimer as outpatient.  · Right Calf Hematoma: Possible evacuation being considered. Surgery following.  · Weakness/Fall: PT following  · Disposition: HH/TBD    Abhi Hernandez MD  [facility]  07/13/21  11:36 EDT    Dictated portions using Dragon dictation software.    During the entire encounter, I was wearing recommended PPE including face mask and eye protection. Hand sanitization was performed prior to entering room and upon exit.

## 2021-07-13 NOTE — PLAN OF CARE
Goal Outcome Evaluation:  Plan of Care Reviewed With: patient        Progress: improving    Patient remains alert and oriented x4, continues on fluid restriction, remains on IV lasix, O2 sats WNL on 3L/NC HIFlo.  No c/o of SOB noted, no s/s of distress noted at this time. Able to make all needs known. All safety measures in place.  Will continue to monitor.

## 2021-07-13 NOTE — SIGNIFICANT NOTE
07/13/21 1410   OTHER   Discipline physical therapist   Rehab Time/Intention   Session Not Performed patient/family declined treatment  (Pt states she has gotten permission for a family member to get her in the shower this afternoon, and she doesn't want to wear herself out beforehand. PT offered exercises in bed, but pt still declined. PT will follow-up tomorrow.)   Recommendation   PT - Next Appointment 07/14/21

## 2021-07-13 NOTE — PLAN OF CARE
Goal Outcome Evaluation:  Plan of Care Reviewed With: patient        Progress: no change  Outcome Summary: Pt c/o RLE and back pain. Surgery would like to drain hematoma tomorrow, but patient refusing at this time. Niece at bedside, is planning on discussing this with patient and let nursing know. XR lumbar spine ordered to evaluate back pain, patient states the pain has been getting worse, started when she fell at home. Mag and potassium replaced per protocol. + Bm today. Attempted shower, but unable to get in, unsafe and niece agreed. Currently on 2L NC.

## 2021-07-14 NOTE — PROGRESS NOTES
"CC: CHF    Interval History: no chest pain. Breathing improving daily.       Vital Signs  Temp:  [97.7 °F (36.5 °C)-98.3 °F (36.8 °C)] 98.3 °F (36.8 °C)  Heart Rate:  [68-84] 83  Resp:  [16-18] 18  BP: (118-143)/(65-73) 143/71    Intake/Output Summary (Last 24 hours) at 7/14/2021 1035  Last data filed at 7/14/2021 1033  Gross per 24 hour   Intake 446 ml   Output 1150 ml   Net -704 ml     Flowsheet Rows      First Filed Value   Admission Height  170.2 cm (67\") Documented at 07/10/2021 1730   Admission Weight  78.2 kg (172 lb 6.4 oz) Documented at 07/10/2021 1730          PHYSICAL EXAM:  General: No acute distress  Resp:NL Rate, unlabored, clear  CV:NL rate and rhythm, NL PMI, Nl S1 and S2, no Murmur, no gallop, no rub, No JVD. Normal pedal pulses  ABD:Nl sounds, no masses or tenderness, nondistended, no guarding or rebound  Neuro: alert,cooperative and oriented  Extr: No edema or cyanosis, moves all extremities, RLE hematoma      Results Review:    Results from last 7 days   Lab Units 07/14/21  0455   SODIUM mmol/L 128*   POTASSIUM mmol/L 4.2   CHLORIDE mmol/L 87*   CO2 mmol/L 34.1*   BUN mg/dL 13   CREATININE mg/dL 0.36*   GLUCOSE mg/dL 88   CALCIUM mg/dL 8.2*     Results from last 7 days   Lab Units 07/11/21  0448 07/10/21  1618   TROPONIN T ng/mL 0.139* 0.171*     Results from last 7 days   Lab Units 07/13/21  0324   WBC 10*3/mm3 9.07   HEMOGLOBIN g/dL 11.8*   HEMATOCRIT % 36.4   PLATELETS 10*3/mm3 233     Results from last 7 days   Lab Units 07/10/21  1618   INR  1.04     Results from last 7 days   Lab Units 07/12/21  0403   CHOLESTEROL mg/dL 137     Results from last 7 days   Lab Units 07/14/21  0455   MAGNESIUM mg/dL 2.6*     Results from last 7 days   Lab Units 07/12/21  0403   CHOLESTEROL mg/dL 137   TRIGLYCERIDES mg/dL 47   HDL CHOL mg/dL 67*   LDL CHOL mg/dL 59     I reviewed the patient's new clinical results.  I personally viewed and interpreted the patient's EKG/Telemetry data        Medication Review: "   Meds reviewed         Assessment/Plan  1.  87-year-old female with chronic diastolic congestive heart failure.  With anasarca.  Preserved left ventricular systolic function and normal wall motion on echo this admission.  She is responded well to IV Lasix.  Her diuresis has tapered off so we will transition her to by mouth Lasix at 40 mg daily  2.  Elevated D-dimer.  No PE on CTA of the chest.  No VT on lower extremity duplex.  No documented A. fib so she is off anticoagulation.  She is to have 2-week mobile telemetry at discharge  3.  Hyponatremia-evaluation per admitting  4.  RLE hematoma- surgery recommended evacuation however patient has declined so conservative therapy continues per her request  5.  Elevated troponin felt to be type II MI.  ECG was without ischemic changes.  Normal left trickle systolic function with normal wall motion on echo.  6.   Anemia.  Hemoglobin stable  7. Mechanical fall  prior to admission    ZIO at discharge.  Appears she will be there couple more days at minimum so we will continue to follow.  RILEY Helms  07/14/21  10:35 EDT

## 2021-07-14 NOTE — PROGRESS NOTES
Chief Complaint:    Right lower extremity hematoma    Subjective:    The patient continues to have pain in the right lower extremity.    Objective:    Temp:  [97.7 °F (36.5 °C)-98.3 °F (36.8 °C)] 98.3 °F (36.8 °C)  Heart Rate:  [68-84] 82  Resp:  [16-18] 18  BP: (118-143)/(65-73) 143/71    Physical Exam  Constitutional:       Appearance: She is ill-appearing. She is not toxic-appearing.   Skin:     Comments: There is a large hematoma in the right posterior lower leg with dark skin discoloration and some distal erythema.   Neurological:      Mental Status: She is alert.   Psychiatric:         Behavior: Behavior is cooperative.         Results:    Covid is negative.    Assessment/Plan:    The patient has a hematoma of the right lower extremity that would be difficult to resolve and has overlying skin changes.  This was again discussed with her.  She was on the schedule to have an evacuation of the hematoma but she is not willing to proceed with surgery because she is worried about a chronic wound, which is the likely outcome.  We will continue to manage conservatively per her request.    Rober Carranza Jr., M.D.

## 2021-07-14 NOTE — PROGRESS NOTES
Name: Erum Yang ADMIT: 7/10/2021   : 1933  PCP: Hank Saunders MD    MRN: [id number] LOS: 4 days   AGE/SEX: 87 y.o. female  ROOM: [facility]/   Subjective   Chief Complaint   Patient presents with   • Fall   • Edema      Reporting constipation.  Passing small amount of flatus.  Had small bowel movement day before yesterday after suppository.  She is concerned about impaction.  She is not having any abdominal pain and not having any nausea or vomiting currently.  No chest pain.  She was up to 7 L when I saw her this morning.  Saturation was okay so I turned her down to 6.  Not having any chest pain or palpitations.  She did feel dyspneic overnight and feels a bit better this morning on the increased oxygen level.  Still having back pain.  No radiation down legs.    Objective   Vital Signs  Temp:  [97.7 °F (36.5 °C)-98.3 °F (36.8 °C)] 98.3 °F (36.8 °C)  Heart Rate:  [68-84] 82  Resp:  [16-18] 18  BP: (118-143)/(65-73) 143/71  SpO2:  [94 %-99 %] 99 %  on  Flow (L/min):  [2-7] 7;   Device (Oxygen Therapy): humidified;nasal cannula  Body mass index is 24.45 kg/m².    Physical Exam  Vitals and nursing note reviewed.   Constitutional:       General: She is not in acute distress.     Appearance: She is not diaphoretic.   HENT:      Head: Normocephalic and atraumatic.   Eyes:      Extraocular Movements: Extraocular movements intact.      Conjunctiva/sclera: Conjunctivae normal.   Cardiovascular:      Rate and Rhythm: Normal rate and regular rhythm.      Pulses: Normal pulses.   Pulmonary:      Effort: Pulmonary effort is normal.      Breath sounds: Decreased breath sounds present. No wheezing.   Abdominal:      General: There is no distension.      Palpations: Abdomen is soft.      Tenderness: There is no abdominal tenderness. There is no guarding or rebound.   Musculoskeletal:         General: Tenderness present.      Cervical back: Neck supple. No tenderness.      Right lower leg: Edema present.      Left  lower leg: Edema present.      Comments: 1-2+   Skin:     General: Skin is warm and dry.      Findings: Bruising (hematoma right calf) and erythema (RLE) present.   Neurological:      Mental Status: She is alert. Mental status is at baseline.   Psychiatric:         Mood and Affect: Mood normal.         Behavior: Behavior normal.         Results Review:       I reviewed the patient's new clinical results.     I reviewed other test results, agree with interpretation.     I reviewed telemetry/EKG results, sinus rhythm during exam      Results from last 7 days   Lab Units 07/13/21  0324 07/12/21  0403 07/11/21  0448 07/10/21  1618   WBC 10*3/mm3 9.07 8.11 12.86* 13.91*   HEMOGLOBIN g/dL 11.8* 11.7* 13.4 13.4   PLATELETS 10*3/mm3 233 234 259 300     Results from last 7 days   Lab Units 07/14/21  0455 07/13/21 0324 07/12/21 0403 07/11/21  0448   SODIUM mmol/L 128* 132* 125* 122*   POTASSIUM mmol/L 4.2 3.2* 3.6 3.7   CHLORIDE mmol/L 87* 85* 88* 85*   CO2 mmol/L 34.1* 34.6* 28.8 25.0   BUN mg/dL 13 19 22 14   CREATININE mg/dL 0.36* 0.41* 0.49* 0.50*   GLUCOSE mg/dL 88 95 84 63*   Estimated Creatinine Clearance: 55.4 mL/min (A) (by C-G formula based on SCr of 0.36 mg/dL (L)).  Results from last 7 days   Lab Units 07/14/21  0455 07/13/21  0324 07/12/21  0403 07/11/21  0448 07/10/21  1618   CALCIUM mg/dL 8.2* 7.7* 7.9* 8.4* 9.0   ALBUMIN g/dL  --   --   --  2.90* 3.40*   MAGNESIUM mg/dL 2.6* 1.8 1.9  --   --      Results from last 7 days   Lab Units 07/10/21  1618   INR  1.04        aspirin, 81 mg, Oral, Daily  atorvastatin, 80 mg, Oral, Nightly  castor oil-balsam peru, , Topical, Q12H  furosemide, 40 mg, Intravenous, BID  hydrocortisone-bacitracin-zinc oxide-nystatin, 1 application, Topical, 4x Daily  metoprolol tartrate, 25 mg, Oral, Q12H  nystatin, , Topical, Q12H  senna-docusate sodium, 2 tablet, Oral, BID  sodium chloride, 10 mL, Intravenous, Q12H       Diet Regular; Cardiac    Assessment/Plan      Active Hospital  Problems    Diagnosis  POA   • Hyponatremia [E87.1]  Yes   • CHF (congestive heart failure) (CMS/HCC) [I50.9]  Unknown   • Pneumonia [J18.9]  Unknown   • Anasarca [R60.1]  Unknown   • Elevated troponin [R77.8]  Unknown   • Weakness [R53.1]  Unknown   • Fall [W19.XXXA]  Unknown      Resolved Hospital Problems   No resolved problems to display.       · Acute Diastolic Heart Failure/Palpitations: Preserved EF on Echo. Only grade 1 diastolic dysfunction. Zio at [place] is planned to determine if having afib outpatient and degree of tachycardia burden.  Rate improved.  O2 requirement worse.  Repeating chest x-ray.  Cardiology following.  · Hyponatremia: Volume overloaded.  Had been improving with diuretics but is a bit worse today.  Will send urinary studies.  · AHRF  · Elevated DDimer: DDimer>20. No PE on CTA and no DVT on Duplex. No documented afib per cardiology so now off AC. Would repeat DDimer as outpatient.  · Right Calf Hematoma: Conservative management planned now per patient request.  Her right lower extremity is having more cellulitic appearance today with increased warmth and redness circumferentially above her ankle.  Hematoma posteriorly is less tense. Will start antibiotic.  Surgery following.  · Constipation: Continue bowel regimen.  Check KUB.  · Lumbago: Moderate to severe degenerative change on XR. Will order MRI.  · Weakness/Fall: PT following  · Disposition: HH/TBJOSHUA Hernandez MD  [facility]  07/14/21  09:34 EDT    Dictated portions using Dragon dictation software.    During the entire encounter, I was wearing recommended PPE including face mask and eye protection. Hand sanitization was performed prior to entering room and upon exit.

## 2021-07-14 NOTE — NURSING NOTE
"Patient refused to sign consent for Evacuation of Right Lower Extremity in the a.m.  Patient stated, \" I'm gonna wait til after I talk to the doctor tomorrow.\"  "

## 2021-07-14 NOTE — PLAN OF CARE
"  Problem: Adult Inpatient Plan of Care  Goal: Plan of Care Review  Outcome: Ongoing, Progressing  Flowsheets (Taken 7/14/2021 6983)  Plan of Care Reviewed With: patient  Outcome Summary: Vital signs stable. Pt refused surgery on R hematoma this AM- see Dr. Campo note. Pt had MRI and X-Ray done this morning. Pt states, \"I can not eat like this\" and states does not have an appetite- SLP consulted.  Q2 turns encouraged. Will monitor.     "

## 2021-07-14 NOTE — SIGNIFICANT NOTE
07/14/21 1523   OTHER   Discipline physical therapist   Rehab Time/Intention   Session Not Performed patient unavailable for treatment  (Pt off floor for imaging for majority of afternoon, too tired for therapy upon return to floor. Discussed with RN, will try to see pt tomorrow AM for PT.)   Recommendation   PT - Next Appointment 07/15/21

## 2021-07-15 NOTE — PLAN OF CARE
Goal Outcome Evaluation:  Plan of Care Reviewed With: patient        Progress: no change     Vitals remain stable at this time. Remains on 3L/NC sats between 88-95%.  Hematoma remains to Right Calf with little drainage noted.  Turned and repositioned every 2 hours and PRN per staff.  No s/s of distress noted at this time.  All safety measures in place.  Will continue to monitor.

## 2021-07-15 NOTE — PROGRESS NOTES
Chief Complaint:    Right lower extremity hematoma    Subjective:    The patient feels about the same regarding her right lower extremity.    Objective:    Temp:  [97.6 °F (36.4 °C)-98.5 °F (36.9 °C)] 97.6 °F (36.4 °C)  Heart Rate:  [67-82] 81  Resp:  [16-18] 16  BP: (115-157)/(63-81) 155/74    Physical Exam  Constitutional:       Appearance: She is ill-appearing. She is not toxic-appearing.   Skin:     Comments: There is a hematoma of the right lower extremity with discoloration of the skin.  There is erythema involving the lower aspect near the ankle that is circumferential.   Neurological:      Mental Status: She is alert.   Psychiatric:         Behavior: Behavior is cooperative.         Results:    WBC is 8.61.  H/H is 10.9/32.7.    Assessment/Plan:    The patient continues to have a hematoma of the right lower extremity but she does not want surgical management.  I agree with the addition of antibiotics and the erythema seems to have improved with this management.  There are no plans for surgical evacuation of the hematoma based on the patient's wishes.    Rober Carranza Jr., M.D.

## 2021-07-15 NOTE — PLAN OF CARE
Goal Outcome Evaluation:  Plan of Care Reviewed With: patient        Progress: improving  Outcome Summary: Pt tolerated treatment fair this date. Required mod-max A x2 for bed mobility d/t weakness, and needed LEs lifted to prevent scooting across bed d/t sensitivity and risk of skin tears. Once sitting on EOB, pt stated her back already felt better. C/o severe back pain in bed only. Ambulated ~5ft to BSC, then transferred to recliner w/ Rw and CGA x1. Pt able to complete a few seated LE exercises and encouraged pt to get up more often w/ nsg to BSC.

## 2021-07-15 NOTE — CASE MANAGEMENT/SOCIAL WORK
Continued Stay Note  [facility]     Patient Name: Erum Yang  MRN: [id number]  Today's Date: 7/15/2021    Admit Date: 7/10/2021    Discharge Plan     Row Name 07/15/21 1432       Plan    Plan  SNF at D/C. Family reviewing facility choices at this time.    Provided Post Acute Provider List?  Yes    Post Acute Provider List  Nursing Home;Home Health    Provided Post Acute Provider Quality & Resource List?  Yes    Post Acute Provider Quality and Resource List  Home Health;Nursing Home    Delivered To  Patient;Support Person    Support Person  sister and nephew    Method of Delivery  In person    Patient/Family in Agreement with Plan  yes    Plan Comments  Met with pt, sister and nephew at bedside. Discharge plan discussed and pt /family want SNF at D/C. Provided [facility], List of SNFs and HH, and information on Medicare Compare Website. Stated they will review and give choices. Chang Johnson RN-BC.        Discharge Codes    No documentation.             Chang Johnson RN

## 2021-07-15 NOTE — PROGRESS NOTES
Neurosurgery consult received.  Reviewed MRI report with Dr. De Leon.  We do not manage sacral fractures.  Patient also has T11 fracture with questionable ligamentous involvement.  Will consult to Dr. Matta to see patient for these issues.  Discussed with Janette Gibson RN.

## 2021-07-15 NOTE — PLAN OF CARE
Goal Outcome Evaluation:  Plan of Care Reviewed With: patient           Outcome Summary: Patient refused clinical swallow evaluation due to not wanting to sit up in bed and reporting back pain and just became comfortable laying down. Patient denied dysphagia unless she was eating/drinking reclined, SLP educated patient and family the importance of sitting upright 90 degrees when eating/drinking. Will re-attempt swallow evaluation 7/16

## 2021-07-15 NOTE — PROGRESS NOTES
Name: Erum Yang ADMIT: 7/10/2021   : 1933  PCP: Hank Saunders MD    MRN: [id number] LOS: 5 days   AGE/SEX: 87 y.o. female  ROOM: E4/   Subjective   Chief Complaint   Patient presents with   • Fall   • Edema      Had bowel movement.  No nausea or vomiting.  Dyspnea is about the same.  No chest pain or palpitations reported.  She has back pain which is lower and worse with movement.  Denies radiation to the legs.  Discussed MRI results with her and neurosurgery consultation.  She was hesitant about procedure but is having a good amount of pain with movement so would like to hear about options.    Objective   Vital Signs  Temp:  [97.5 °F (36.4 °C)-98.5 °F (36.9 °C)] 97.5 °F (36.4 °C)  Heart Rate:  [67-82] 73  Resp:  [16-18] 16  BP: (115-179)/(63-85) 179/85  SpO2:  [92 %-97 %] 96 %  on  Flow (L/min):  [1.5-4] 1.5;   Device (Oxygen Therapy): nasal cannula  Body mass index is 26.41 kg/m².    Physical Exam  Vitals and nursing note reviewed.   Constitutional:       General: She is in acute distress (2/2 pain).      Appearance: She is not diaphoretic.   HENT:      Head: Normocephalic and atraumatic.   Eyes:      Extraocular Movements: Extraocular movements intact.      Conjunctiva/sclera: Conjunctivae normal.   Cardiovascular:      Rate and Rhythm: Normal rate and regular rhythm.      Pulses: Normal pulses.   Pulmonary:      Effort: Pulmonary effort is normal. No respiratory distress.      Breath sounds: Decreased breath sounds present. No wheezing.   Abdominal:      General: There is no distension.      Palpations: Abdomen is soft.      Tenderness: There is no abdominal tenderness. There is no guarding or rebound.   Musculoskeletal:         General: Tenderness present.      Cervical back: Neck supple. No tenderness.      Right lower leg: Edema present.      Left lower leg: Edema present.      Comments: 1-2+   Skin:     General: Skin is warm and dry.      Findings: Bruising (hematoma right calf) and  erythema (RLE) present.   Neurological:      Mental Status: She is alert. Mental status is at baseline.   Psychiatric:         Mood and Affect: Mood normal.         Behavior: Behavior normal.         Results Review:       I reviewed the patient's new clinical results.     I reviewed other test results, agree with interpretation.     I reviewed telemetry/EKG results, sinus rhythm during exam      Results from last 7 days   Lab Units 07/15/21  0314 07/13/21  0324 07/12/21  0403 07/11/21  0448   WBC 10*3/mm3 8.61 9.07 8.11 12.86*   HEMOGLOBIN g/dL 10.9* 11.8* 11.7* 13.4   PLATELETS 10*3/mm3 189 233 234 259     Results from last 7 days   Lab Units 07/15/21  0314 07/14/21  0455 07/13/21  0324 07/12/21  0403   SODIUM mmol/L 127* 128* 132* 125*   POTASSIUM mmol/L 3.6 4.2 3.2* 3.6   CHLORIDE mmol/L 85* 87* 85* 88*   CO2 mmol/L 36.8* 34.1* 34.6* 28.8   BUN mg/dL 11 13 19 22   CREATININE mg/dL 0.35* 0.36* 0.41* 0.49*   GLUCOSE mg/dL 77 88 95 84   Estimated Creatinine Clearance: 52.9 mL/min (A) (by C-G formula based on SCr of 0.35 mg/dL (L)).  Results from last 7 days   Lab Units 07/15/21  0314 07/14/21  0455 07/13/21  0324 07/12/21  0403 07/11/21  0448 07/10/21  1618   CALCIUM mg/dL 8.0* 8.2* 7.7* 7.9* 8.4* 9.0   ALBUMIN g/dL  --   --   --   --  2.90* 3.40*   MAGNESIUM mg/dL 2.3 2.6* 1.8 1.9  --   --      Results from last 7 days   Lab Units 07/10/21  1618   INR  1.04        aspirin, 81 mg, Oral, Daily  atorvastatin, 80 mg, Oral, Nightly  castor oil-balsam peru, , Topical, Q12H  ceFAZolin, 1 g, Intravenous, Q8H  furosemide, 40 mg, Oral, Daily  hydrocortisone-bacitracin-zinc oxide-nystatin, 1 application, Topical, 4x Daily  metoprolol tartrate, 25 mg, Oral, Q12H  nystatin, , Topical, Q12H  polyethylene glycol, 17 g, Oral, Daily  senna-docusate sodium, 2 tablet, Oral, BID  sodium chloride, 10 mL, Intravenous, Q12H       Diet Regular; Cardiac    Assessment/Plan      Active Hospital Problems    Diagnosis  POA   • Hyponatremia  [E87.1]  Yes   • CHF (congestive heart failure) (CMS/HCC) [I50.9]  Unknown   • Pneumonia [J18.9]  Unknown   • Anasarca [R60.1]  Unknown   • Elevated troponin [R77.8]  Unknown   • Weakness [R53.1]  Unknown   • Fall [W19.XXXA]  Unknown      Resolved Hospital Problems   No resolved problems to display.       · Acute Diastolic Heart Failure/Palpitations: Preserved EF on Echo. Only grade 1 diastolic dysfunction. Zio at [place] is planned to determine if having afib outpatient and degree of tachycardia burden.  Weaning O2 as able. Repeat CXR similar with some improvement on the left but has a small effusions. Cardiology following.  · Hyponatremia: Volume overloaded.  About stable overnight. Changed to oral diuretics.  · AHRF  · Elevated DDimer: DDimer>20. No PE on CTA and no DVT on Duplex. No documented afib per cardiology so now off AC. Would repeat DDimer as outpatient.  · Right Calf Hematoma/Cellulitis: Conservative management planned. Continue cefazolin.  Surgery following.  · Constipation: Improved. Continue bowel regimen.  · S3 Fracture, T11 fracture, Degenerative changes: LEVAR consult.  · Weakness/Fall: PT following  · Disposition: HH/TBJOSHUA Hernandez MD  [facility]  07/15/21  14:01 EDT    Dictated portions using Dragon dictation software.    During the entire encounter, I was wearing recommended PPE including face mask and eye protection. Hand sanitization was performed prior to entering room and upon exit.

## 2021-07-15 NOTE — PLAN OF CARE
Problem: Adult Inpatient Plan of Care  Goal: Plan of Care Review  Outcome: Ongoing, Progressing  Flowsheets (Taken 7/15/2021 7687)  Plan of Care Reviewed With: patient  Outcome Summary: Vitals stable. Pt. worked with PT today. Up to chair with staff. Neurosurgery and ortho surgery consulted. IV antibiotics contiuned. Will monitor.

## 2021-07-15 NOTE — PROGRESS NOTES
"CC: CHF    Interval History: switched to by mouth lasix yesterday. Diuresed well overnight. C/o leg pain, being stiff and not feeling good. No chest pain. Breathing improved.       Vital Signs  Temp:  [97.6 °F (36.4 °C)-98.5 °F (36.9 °C)] 97.6 °F (36.4 °C)  Heart Rate:  [67-82] 81  Resp:  [16-18] 16  BP: (115-157)/(63-81) 155/74    Intake/Output Summary (Last 24 hours) at 7/15/2021 0914  Last data filed at 7/15/2021 0746  Gross per 24 hour   Intake 100 ml   Output 2100 ml   Net -2000 ml     Flowsheet Rows      First Filed Value   Admission Height  170.2 cm (67\") Documented at 07/10/2021 1730   Admission Weight  78.2 kg (172 lb 6.4 oz) Documented at 07/10/2021 1730          PHYSICAL EXAM:  General: No acute distress  Resp:NL Rate, unlabored, diminished   CV:NL rate and rhythm, NL PMI, Nl S1 and S2, no Murmur, no gallop, no rub, No JVD. Normal pedal pulses  ABD:Nl sounds, no masses or tenderness, nondistended, no guarding or rebound  Neuro: alert,cooperative and oriented  Extr: erythema, moves all extremities      Results Review:    Results from last 7 days   Lab Units 07/15/21  0314   SODIUM mmol/L 127*   POTASSIUM mmol/L 3.6   CHLORIDE mmol/L 85*   CO2 mmol/L 36.8*   BUN mg/dL 11   CREATININE mg/dL 0.35*   GLUCOSE mg/dL 77   CALCIUM mg/dL 8.0*     Results from last 7 days   Lab Units 07/11/21  0448 07/10/21  1618   TROPONIN T ng/mL 0.139* 0.171*     Results from last 7 days   Lab Units 07/15/21  0314   WBC 10*3/mm3 8.61   HEMOGLOBIN g/dL 10.9*   HEMATOCRIT % 32.7*   PLATELETS 10*3/mm3 189     Results from last 7 days   Lab Units 07/10/21  1618   INR  1.04     Results from last 7 days   Lab Units 07/12/21  0403   CHOLESTEROL mg/dL 137     Results from last 7 days   Lab Units 07/15/21  0314   MAGNESIUM mg/dL 2.3     Results from last 7 days   Lab Units 07/12/21  0403   CHOLESTEROL mg/dL 137   TRIGLYCERIDES mg/dL 47   HDL CHOL mg/dL 67*   LDL CHOL mg/dL 59     I reviewed the patient's new clinical results.  I " personally viewed and interpreted the patient's EKG/Telemetry data        Medication Review:   Meds reviewed         Assessment/Plan    1.  87-year-old female with chronic diastolic congestive heart failure.  With anasarca.  Preserved left ventricular systolic function and normal wall motion on echo this admission.  She has responded well to IV Lasix. continue Lasix at 40 mg daily  2.  Elevated D-dimer.  No PE on CTA of the chest.  No VT on lower extremity duplex.  No documented A. fib so she is off anticoagulation.  She is to have 2-week mobile telemetry at discharge ( ordered)  3.  Hyponatremia-evaluation per admitting  4.  RLE hematoma- surgery recommended evacuation however patient has declined so conservative therapy continues per her request  5.  Elevated troponin felt to be type II MI.  ECG was without ischemic changes.  Normal left ventricular systolic function with normal wall motion on echo.  6.   Anemia.  Hemoglobin stable  7. Mechanical fall  prior to admission    Continue oral lasix. Wean oxygen as tolerated. I encouraged that she participate in PT.   RILEY Helms  07/15/21  09:14 EDT
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
## 2021-07-16 NOTE — PLAN OF CARE
Goal Outcome Evaluation:  Plan of Care Reviewed With: patient           Outcome Summary: Attempted swallow evaluation this AM pt refusing PO due to not feeling well and nauseous.
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
## 2021-07-16 NOTE — NURSING NOTE
CWON note: pt seen for follow up evaluation of skin issues POA. Pt is refusing a low air loss mattress, but is using the  Accumax mattress pump. Omari score 16, pt is incontinent of urine and stool, she was up in the chair upon my arrival today.     Assessment: Gluteal cleft/ buttocks rash is resolving with use of Magic Barrier Cream, alternated with Z Guard cream 4x day. Shiv heels are boggy with slowly blanchable erythema, continue Venelex and keep heels off-loaded at all times. Left elbow with pink partial thickness skin tear scabbed and open to air.  Rt Lower leg with 8x 8cms hematoma present secondary to fall at home. Pt is refusing surgical evacuation. Skin is starting to form eschar on the most posterior aspect of the hematoma.        Plan: Continue Prevention standing orders   Venelex ointment with heels off-loaded  Magic barrier cream alternated with Z Guard cream to rash on buttocks.   Pt is refusing low air loss mattress at this time

## 2021-07-16 NOTE — PROGRESS NOTES
Chief Complaint:    Right lower extremity hematoma    Subjective:    The patient generally feels very poorly with pain everywhere.  She is considering withdrawing care and taking a palliative approach.  She can feel some drainage from the hematoma.    Objective:    Temp:  [97.4 °F (36.3 °C)-98.5 °F (36.9 °C)] 98.5 °F (36.9 °C)  Heart Rate:  [73-86] 79  Resp:  [16-22] 18  BP: (145-179)/(73-85) 147/77    Physical Exam  Constitutional:       Appearance: She is ill-appearing. She is not toxic-appearing.   Skin:     Comments: Right lower extremity has erythema and a large hematoma posteriorly with skin changes and some breakdown and liquefied hematoma draining.   Neurological:      Mental Status: She is alert.   Psychiatric:         Behavior: Behavior is cooperative.         Results:    WBC is 10.09.  H/H is 11.3/35.2.    Assessment/Plan:    The patient has a right lower extremity hematoma that is starting to spontaneously drain.  She also has cellulitis and is on antibiotic management.  She really wants no surgical management and is considering palliative care.  I will follow peripherally.    Rober Carranza Jr., M.D.

## 2021-07-16 NOTE — PLAN OF CARE
Goal Outcome Evaluation:  Plan of Care Reviewed With: patient        Progress: improving  Outcome Summary: Pt tolerated treatment fair this date. Ambulated 4ft to the BSC for a seated rest break, then 4ft back to bed w/ CGA x2 for safety. Required min A to stand. Pt still requires assist x2 for bed mobility, though slight improvement noted w/ supine to sit. Pt mentioned interest in palliative care d/t thinking she isn't able to get any better from current medical status. Educated pt on importance of PT and mobility to get stronger.

## 2021-07-16 NOTE — CONSULTS
"Purpose of the visit was to evaluate for: goals of care/advanced care planning and support for patient/family. Spoke with RN as well as patient and family and discussed palliative care, goals of care, resuscitation status and [facility].      Assessment:  Patient is palliative care appropriate CHF, PNA, anasarca, weakness, fall. PPS: 30%.     Recommendations/Plan: Change code status DNR/DNI. Patient still deciding on goals of care. She wants to talk to her sister.     Other Comments:   Palliative Care spoke to patient and her sister, Elif, regarding GOC and support. Patient expressed feeling \"scrunched up\" and noted having pain and not being comfortable. Patient expressed that her worry and fear is that she won't get comfortable. She and sister processed through their strong bond and supportive relationship as well as their spiritual beliefs. Patient and sister lean heavily on their \"trust in God\" and they feel at peace with end of life. Patient processed that she is somewhat fearful of the \"unknown\" related to the afterlife but \"I know where I am going.\" Patient and sister stated that they are of [employer] n[employer] and patient was anointed earlier this week. They stated that they are \"having to accept the idea of a new agenda\" referencing patient's declining health and level of discomfort. They are considering comfort care but wish to speak together more prior to making decision.    Addressed CODE STATUS. Patient wishes to be a DNR/DNI, no cardioversion/defibrillation, no feeding tube/artificial nutrition.     Discussed palliative care services and symptom management as well as expectations in the care on palliative care unit. Provided emotional and psychosocial support to patient and sister. Provided contact information and advised patient and family of availability.   "

## 2021-07-16 NOTE — PROGRESS NOTES
Name: Erum Yang ADMIT: 7/10/2021   : 1933  PCP: Hank Saunders MD    MRN: [id number] LOS: 6 days   AGE/SEX: 87 y.o. female  ROOM: E4/   Subjective   Chief Complaint   Patient presents with   • Fall   • Edema      Uncomfortable today.  She is short of breath and has continued edema.  Reports no palpitations.  No productive cough.  Her back pain is severe and worse with movement and depending on positioning.  Not reporting any nausea vomiting or diarrhea.  She told me that she is not sure how much more of this she would like to go through and is considering withdrawing care.  Discussed with her that I am always agreeable to stopping aggressive treatment and focusing on symptom control.  She is going to discuss with her sister and then consider whether or not she would like to talk with palliative care.  I did discuss additional diuretic today for volume overload and mild respiratory distress.  Also discussed increasing pain medication today for symptom control of her back pain.  Initially I had held Lasix when I saw her worse hyponatremia this morning until I was able to evaluate her.  Discussed with cardiology at bedside and are giving Lasix now.  Also discussed with them repeat urine studies and nephrology consultation.    Objective   Vital Signs  Temp:  [97.4 °F (36.3 °C)-98.5 °F (36.9 °C)] 98.5 °F (36.9 °C)  Heart Rate:  [73-86] 79  Resp:  [16-22] 18  BP: (145-179)/(73-85) 147/77  SpO2:  [90 %-100 %] 100 %  on  Flow (L/min):  [1-3] 2;   Device (Oxygen Therapy): humidified;nasal cannula  Body mass index is 26.41 kg/m².    Physical Exam  Vitals and nursing note reviewed.   Constitutional:       General: She is in acute distress (2/2 pain).      Appearance: She is not diaphoretic.   HENT:      Head: Normocephalic and atraumatic.   Eyes:      Extraocular Movements: Extraocular movements intact.      Conjunctiva/sclera: Conjunctivae normal.   Cardiovascular:      Rate and Rhythm: Normal rate and  regular rhythm.      Pulses: Normal pulses.   Pulmonary:      Effort: Pulmonary effort is normal. No respiratory distress.      Breath sounds: Decreased breath sounds present. No wheezing.   Abdominal:      General: There is no distension.      Palpations: Abdomen is soft.      Tenderness: There is no abdominal tenderness. There is no guarding or rebound.   Musculoskeletal:         General: Tenderness present.      Cervical back: Neck supple. No tenderness.      Right lower leg: Edema present.      Left lower leg: Edema present.      Comments: 2-3+   Skin:     General: Skin is warm and dry.      Findings: Bruising (hematoma right calf) and erythema (RLE) present.   Neurological:      Mental Status: She is alert. Mental status is at baseline.   Psychiatric:         Mood and Affect: Mood normal.         Behavior: Behavior normal.         Results Review:       I reviewed the patient's new clinical results.     I reviewed telemetry/EKG results, sinus rhythm during exam      Results from last 7 days   Lab Units 07/16/21  0314 07/15/21  0314 07/13/21  0324 07/12/21  0403   WBC 10*3/mm3 10.09 8.61 9.07 8.11   HEMOGLOBIN g/dL 11.3* 10.9* 11.8* 11.7*   PLATELETS 10*3/mm3 163 189 233 234     Results from last 7 days   Lab Units 07/16/21  0757 07/16/21  0314 07/15/21  0314 07/14/21  0455 07/13/21  0324   SODIUM mmol/L 120* 119* 127* 128* 132*   POTASSIUM mmol/L  --  3.7 3.6 4.2 3.2*   CHLORIDE mmol/L  --  79* 85* 87* 85*   CO2 mmol/L  --  30.8* 36.8* 34.1* 34.6*   BUN mg/dL  --  10 11 13 19   CREATININE mg/dL  --  0.27* 0.35* 0.36* 0.41*   GLUCOSE mg/dL  --  84 77 88 95   Estimated Creatinine Clearance: 52.9 mL/min (A) (by C-G formula based on SCr of 0.27 mg/dL (L)).  Results from last 7 days   Lab Units 07/16/21  0314 07/15/21  0314 07/14/21  0455 07/13/21  0324 07/11/21  0448 07/10/21  1618 07/10/21  1618   CALCIUM mg/dL 7.8* 8.0* 8.2* 7.7* 8.4*  --  9.0   ALBUMIN g/dL  --   --   --   --  2.90*  --  3.40*   MAGNESIUM mg/dL  1.7 2.3 2.6* 1.8  --    < >  --     < > = values in this interval not displayed.     Results from last 7 days   Lab Units 07/10/21  1618   INR  1.04        aspirin, 81 mg, Oral, Daily  atorvastatin, 80 mg, Oral, Nightly  castor oil-balsam peru, , Topical, Q12H  ceFAZolin, 1 g, Intravenous, Q8H  furosemide, 40 mg, Intravenous, Once  hydrocortisone-bacitracin-zinc oxide-nystatin, 1 application, Topical, 4x Daily  metoprolol tartrate, 25 mg, Oral, Q12H  nystatin, , Topical, Q12H  polyethylene glycol, 17 g, Oral, Daily  senna-docusate sodium, 2 tablet, Oral, BID  sodium chloride, 10 mL, Intravenous, Q12H       Diet Regular; Cardiac, Daily Fluid Restriction; 1500 mL Fluid Per Day    Assessment/Plan      Active Hospital Problems    Diagnosis  POA   • Hyponatremia [E87.1]  Yes   • CHF (congestive heart failure) (CMS/HCC) [I50.9]  Unknown   • Pneumonia [J18.9]  Unknown   • Anasarca [R60.1]  Unknown   • Elevated troponin [R77.8]  Unknown   • Weakness [R53.1]  Unknown   • Fall [W19.XXXA]  Unknown      Resolved Hospital Problems   No resolved problems to display.       · Acute Diastolic Heart Failure/Palpitations: Preserved EF on EchoZio at [place] is planned to determine if having afib outpatient and degree of tachycardia burden. Worse edema today. Additional IV lasix ordered. Cardiology following.  · Hyponatremia: Volume overloaded.  Was improving with diuresis but lower today. Repeating urinary studies and consulting nephrology.  · AHRF  · Elevated DDimer: DDimer>20. No PE on CTA and no DVT on Duplex. No documented afib per cardiology so now off AC. Would repeat DDimer as outpatient.  · Right Calf Hematoma/Cellulitis: Conservative management planned. Continue cefazolin.  Surgery following.  · Constipation: Improved. Continue bowel regimen.  · S3 Fracture, T11 fracture, Degenerative changes: Increasing pain regimen. LEVAR and Orthopedics evaluated.  · Weakness/Fall: PT following  · Goals of care: Patient mentioned she is  considering palliative care. She is going to discuss with her family today and then let us know her wishes. I offered palliative consult and she is considering that as well.  · Disposition: HH/TBD    Discussed with Ms Crum, cardiology aprn.    Abhi Hernandez MD  [facility]  07/16/21  09:01 EDT    Dictated portions using Dragon dictation software.    During the entire encounter, I was wearing recommended PPE including face mask and eye protection. Hand sanitization was performed prior to entering room and upon exit.
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
## 2021-07-16 NOTE — PROGRESS NOTES
"    Patient Name: Erum Yang  :1933  87 y.o.      Patient Care Team:  Hank Saunders MD as PCP - General (Family Medicine)  Shawn Parrish MD as Consulting Physician (Hematology and Oncology)  Hank Saunders MD as Referring Physician (Family Medicine)    Chief Complaint: f/u CHF    Interval History: BP somehwat labile. Net out put positive. Weight up 12 lbs on bedscale?  Breathing shallow but states is not really worse. Feels the same as yesterday and just overall feels poorly mostly due to pain.  Has edema that is unchanged. No chest pain, dizziness, palpitations. SR on tele with no sustain arrhythmia or events over night. Has back and leg pain. Sitting up in chair. NA further declined to 119 today repeat labs and osmolality ordered and drawn while at bedside.       Objective   Vital Signs  Temp:  [97.4 °F (36.3 °C)-98.5 °F (36.9 °C)] 98.5 °F (36.9 °C)  Heart Rate:  [73-86] 79  Resp:  [16-22] 18  BP: (145-179)/(73-85) 147/77    Intake/Output Summary (Last 24 hours) at 2021 0736  Last data filed at 2021 0718  Gross per 24 hour   Intake 910 ml   Output 650 ml   Net 260 ml     Flowsheet Rows      First Filed Value   Admission Height  170.2 cm (67\") Documented at 07/10/2021 1730   Admission Weight  78.2 kg (172 lb 6.4 oz) Documented at 07/10/2021 1730          Physical Exam:   General Appearance:    Alert, cooperative, in no acute distress, sitting up in chair   Lungs:     Crackles in bases.  Somewhat shallow respirations. On 2 liters O2      Heart:    Regular rhythm and normal rate, normal S1 and S2, no murmurs, gallops or rubs.  +JVD   Chest Wall:    No abnormalities observed   Abdomen:     Soft, nontender, positive bowel sounds.     Extremities:   1-2+ BLE edema. Palpable radial/dp pulses, right left redness/purple/warm and tender to touch.       Results Review:    Results from last 7 days   Lab Units 21  0314   SODIUM mmol/L 119*   POTASSIUM mmol/L 3.7   CHLORIDE mmol/L 79* "   CO2 mmol/L 30.8*   BUN mg/dL 10   CREATININE mg/dL 0.27*   GLUCOSE mg/dL 84   CALCIUM mg/dL 7.8*     Results from last 7 days   Lab Units 07/11/21  0448 07/10/21  1618   TROPONIN T ng/mL 0.139* 0.171*     Results from last 7 days   Lab Units 07/16/21  0314   WBC 10*3/mm3 10.09   HEMOGLOBIN g/dL 11.3*   HEMATOCRIT % 35.2   PLATELETS 10*3/mm3 163     Results from last 7 days   Lab Units 07/10/21  1618   INR  1.04     Results from last 7 days   Lab Units 07/16/21  0314   MAGNESIUM mg/dL 1.7     Results from last 7 days   Lab Units 07/12/21  0403   CHOLESTEROL mg/dL 137   TRIGLYCERIDES mg/dL 47   HDL CHOL mg/dL 67*   LDL CHOL mg/dL 59               Medication Review:   aspirin, 81 mg, Oral, Daily  atorvastatin, 80 mg, Oral, Nightly  castor oil-balsam peru, , Topical, Q12H  ceFAZolin, 1 g, Intravenous, Q8H  furosemide, 40 mg, Oral, Daily  hydrocortisone-bacitracin-zinc oxide-nystatin, 1 application, Topical, 4x Daily  metoprolol tartrate, 25 mg, Oral, Q12H  nystatin, , Topical, Q12H  polyethylene glycol, 17 g, Oral, Daily  senna-docusate sodium, 2 tablet, Oral, BID  sodium chloride, 10 mL, Intravenous, Q12H              Assessment/Plan     `1. chronic diastolic congestive heart failure.  With anasarca. Mild to mod pleural effusions 7/14 cxray. Preserved left ventricular systolic function and normal wall motion with grade1 diastolic dysfunction and mild pulmonary HTN and mild LVH on echo  7/12/21.  She was IV diuresed now on po furosemide daily  2.  Elevated D-dimer.  No PE on CTA of the chest.  No VT on lower extremity duplex.  No documented A. fib so she is off anticoagulation.  She is to have 2-week mobile telemetry at discharge ( ordered)  3.  Hyponatremia- NA further declined to 119 today. Repeat labs orders. Hospitalist has consulted nephrology.  4.  RLE hematoma/cellulitis- surgery recommended evacuation however patient has declined so conservative therapy continues per her request. She is on atbx.  5.  Elevated  troponin felt to be type II MI.  ECG was without ischemic changes.  Normal left ventricular systolic function with normal wall motion on echo. Lipids good 7/12/21 No chest pain. She is on aspirin/statin.  6.   Anemia.  Hemoglobin stable and improved today from yesterday. Sees hematology outpatient for IV iron.  7. Mechanical fall  prior to admission  8. Sacral fracture: having a lot of back pain. ortho following- she wants conservative treatment. Pain management per medicine.  9. Hypertension: BP somewhat labile. Monitor on beta blocker.  10. Questionable Afib: No sustained arrhythmia on tele overnight. Not on AC due to no documented clot and has right leg hematoma from fall. Will continue to monitor for arrhythmia on telemetry and plan to go home with outpatient monitor which has already been ordered.She had normal atrial cavity sizes on 7/12/21 echo.    She is on 1500 cc fluid restriction. Po diuretics discontinued. IV lasix 40 mg x 1 dose today for volume overload with weight gain and suboptimal urinary output- discussed with Dr. Hernandez at bedside ok for one dose of lasix now and then further recs to be made by nephrology. Patient is considering palliative care discussion but wants to talk with family first.     Thank you for allowing me to participate in this patient's care. Please call me with any questions/concerns.     RILEY Brooks  [facility]  07/16/21  07:36 EDT

## 2021-07-16 NOTE — PLAN OF CARE
Goal Outcome Evaluation:  Plan of Care Reviewed With: patient        Progress: improving  Outcome Summary: Pt rested throughout the night. Turned and repositioned per patient tolerance. VSS with no acute changes.

## 2021-07-17 NOTE — PROGRESS NOTES
LOS: 7 days   Patient Care Team:  Hank Saunders MD as PCP - General (Family Medicine)  Shawn Parrish MD as Consulting Physician (Hematology and Oncology)  Hank Saunders MD as Referring Physician (Family Medicine)    Chief Complaint:     F/u chf    Interval History:     She is very weak and knows that she is very ill.  She is expressing the desire to go palliative as she does not think that she can recover from this.     Objective   Vital Signs  Temp:  [97.7 °F (36.5 °C)-98.7 °F (37.1 °C)] 97.7 °F (36.5 °C)  Heart Rate:  [70-81] 81  Resp:  [16-18] 18  BP: (116-151)/(53-85) 151/74    Intake/Output Summary (Last 24 hours) at 7/17/2021 0910  Last data filed at 7/17/2021 0744  Gross per 24 hour   Intake 210 ml   Output 2350 ml   Net -2140 ml       Comfortable NAD  Neck supple, no JVD or thyromegaly appreciated  S1/S2 RRR, no m/r/g  Lungs exp rhonchi, normal effort  Abdomen S/NT/ND (+) BS, no HSM appreciated  Extremities warm, no clubbing, cyanosis, or edema  No visible or palpable skin lesions  A/Ox4, mood and affect appropriate    Results Review:      Results from last 7 days   Lab Units 07/17/21  0810 07/16/21  1150 07/16/21  0757 07/16/21  0314   SODIUM mmol/L 121* 120* 120* 119*   POTASSIUM mmol/L 4.3 3.3*  --  3.7   CHLORIDE mmol/L 82* 78*  --  79*   CO2 mmol/L 29.1* 31.9*  --  30.8*   BUN mg/dL 6* 9  --  10   CREATININE mg/dL 0.25* 0.30*  --  0.27*   GLUCOSE mg/dL 70 96  --  84   CALCIUM mg/dL 7.9* 8.0*  --  7.8*     Results from last 7 days   Lab Units 07/11/21  0448 07/10/21  1618   TROPONIN T ng/mL 0.139* 0.171*     Results from last 7 days   Lab Units 07/16/21  0314 07/15/21  0314 07/13/21  0324   WBC 10*3/mm3 10.09 8.61 9.07   HEMOGLOBIN g/dL 11.3* 10.9* 11.8*   HEMATOCRIT % 35.2 32.7* 36.4   PLATELETS 10*3/mm3 163 189 233     Results from last 7 days   Lab Units 07/10/21  1618   INR  1.04     Results from last 7 days   Lab Units 07/12/21  0403   CHOLESTEROL mg/dL 137     Results from last  7 days   Lab Units 07/17/21  0810   MAGNESIUM mg/dL 2.2     Results from last 7 days   Lab Units 07/12/21  0403   CHOLESTEROL mg/dL 137   TRIGLYCERIDES mg/dL 47   HDL CHOL mg/dL 67*   LDL CHOL mg/dL 59       I reviewed the patient's new clinical results.  I personally viewed and interpreted the patient's EKG/Telemetry data        Medication Review:   aspirin, 81 mg, Oral, Daily  atorvastatin, 80 mg, Oral, Nightly  castor oil-balsam peru, , Topical, Q12H  ceFAZolin, 1 g, Intravenous, Q8H  hydrocortisone-bacitracin-zinc oxide-nystatin, 1 application, Topical, 4x Daily  metoprolol tartrate, 25 mg, Oral, Q12H  nystatin, , Topical, Q12H  polyethylene glycol, 17 g, Oral, Daily  senna-docusate sodium, 2 tablet, Oral, BID  sodium chloride, 10 mL, Intravenous, Q12H             Assessment/Plan       Hyponatremia    CHF (congestive heart failure) (CMS/HCC)    Pneumonia    Anasarca    Elevated troponin    Weakness    Fall    1. chronic diastolic congestive heart failure.  With anasarca. Mild to mod pleural effusions 7/14 cxray. Preserved left ventricular systolic function and normal wall motion with grade1 diastolic dysfunction and mild pulmonary HTN and mild LVH on echo  7/12/21.  She was IV diuresed now on po furosemide daily  2.  Elevated D-dimer.  No PE on CTA of the chest.  No VT on lower extremity duplex.  No documented A. fib so she is off anticoagulation.  She is to have 2-week mobile telemetry at discharge ( ordered)  3.  Hyponatremia- NA further declined to 119 today. Repeat labs orders. Hospitalist has consulted nephrology.  4.  RLE hematoma/cellulitis- surgery recommended evacuation however patient has declined so conservative therapy continues per her request. She is on abx.  5.  Elevated troponin felt to be type II MI.  ECG was without ischemic changes.  Normal left ventricular systolic function with normal wall motion on echo. She is on aspirin/statin.  6.   Anemia.  Hemoglobin stable and improved today from  yesterday. Sees hematology outpatient for IV iron.  7. Mechanical fall  prior to admission  8. Sacral fracture: having a lot of back pain. ortho following- she wants conservative treatment. Pain management per medicine.  9. Hypertension: BP somewhat labile. Monitor on beta blocker.  10. Questionable Afib: No sustained arrhythmia on tele overnight. Not on AC due to no documented clot and has right leg hematoma from fall. Will continue to monitor for arrhythmia on telemetry and plan to go home with outpatient monitor which has already been ordered.      Her prognosis is very poor and I do not think she is really can be a recover from this.  She is expressing a desire to move into palliative care.  I think that is reasonable.  We will see as needed. I did notify her nurse Zuleika.      Lynn Rice MD  07/17/21  09:10 EDT
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
## 2021-07-17 NOTE — PLAN OF CARE
Goal Outcome Evaluation:  Plan of Care Reviewed With: patient        Progress: no change  Outcome Summary: VSS. C/o pain in lower back - relieved with PRN pain meds. Turned q 2 hours. Purewick changed. Minimal drainage from hematome on RLE. Cream applied to coccyx. 1500cc fluid restriction continued. K and Mag replaed - waiting for AM recheck. No other changes. Will continue to monitor

## 2021-07-17 NOTE — PROGRESS NOTES
" LOS: 7 days   Patient Care Team:  Hank Saunders MD as PCP - General (Family Medicine)  Shawn Parrish MD as Consulting Physician (Hematology and Oncology)  Hank Saunders MD as Referring Physician (Family Medicine)    Chief Complaint: hyponatremia     Subjective     History of Present Illness  This is a 87 y.o. year old female who was initially admitted to the hospital on 7/10 with increasing dyspnea, edema and A. fib. She was started on IV diuretics and cardiology was consulted for her A. fib. Her initial sodium was 123, did improve up to 132 on the 13th but has steadily fallen again and was down to 119 this morning. She states compliance with fluid restriction, has been on IV diuretics with improvement in her lower extremity edema but she remains dyspneic. BNP was only 900 yesterday. Urine studies yesterday showed a urine osmolality of 240 and a urine sodium of 85. Chest x-ray yesterday showed normal vascular, small effusions. Uric acid was 4.4, cortisol and TSH have both been normal this admission. She is not on any thiazide diuretics, SSRIs Or antibiotics.    Subjective  Patient feeling poorly today.   Reports she cant eat or drink while laying down but she is not allowed to sit up.   Denies chest pain, reports some shallow breathing.   Her leg hurts     History taken from: patient chart    Objective     Vital Sign Min/Max for last 24 hours  Temp  Min: 97.7 °F (36.5 °C)  Max: 98.7 °F (37.1 °C)   BP  Min: 116/53  Max: 151/74   Pulse  Min: 70  Max: 81   Resp  Min: 16  Max: 18   SpO2  Min: 93 %  Max: 94 %   Flow (L/min)  Min: 2  Max: 2   Weight  Min: 72.3 kg (159 lb 6.3 oz)  Max: 72.3 kg (159 lb 6.3 oz)     Flowsheet Rows      First Filed Value   Admission Height  170.2 cm (67\") Documented at 07/10/2021 1730   Admission Weight  78.2 kg (172 lb 6.4 oz) Documented at 07/10/2021 1730          No intake/output data recorded.  I/O last 3 completed shifts:  In: 210 [P.O.:210]  Out: 2750 " [Urine:2750]    Objective  Physical Examination: General appearance - alert, NAD but ill apperaing  Mental status - alert, oriented to person, place, and time  Eyes - pupils equal and reactive, extraocular eye movements intact  Mouth - mucous membranes moist, pharynx normal without lesions  Chest - clear to auscultation, no wheezes, rales or rhonchi, symmetric air entry  Heart - normal rate, regular rhythm, normal S1, S2, no murmurs, rubs, clicks or gallops  Abdomen - soft, nontender, nondistended, no masses or organomegaly  Extremities - + edema, + hematoma in right leg  Skin - normal coloration and turgor, no rashes, no suspicious skin lesions noted  Results Review:     I reviewed the patient's new clinical results.    WBC WBC   Date Value Ref Range Status   07/16/2021 10.09 3.40 - 10.80 10*3/mm3 Final   07/15/2021 8.61 3.40 - 10.80 10*3/mm3 Final      HGB Hemoglobin   Date Value Ref Range Status   07/16/2021 11.3 (L) 12.0 - 15.9 g/dL Final   07/15/2021 10.9 (L) 12.0 - 15.9 g/dL Final      HCT Hematocrit   Date Value Ref Range Status   07/16/2021 35.2 34.0 - 46.6 % Final   07/15/2021 32.7 (L) 34.0 - 46.6 % Final      Platlets No results found for: LABPLAT   MCV MCV   Date Value Ref Range Status   07/16/2021 92.1 79.0 - 97.0 fL Final   07/15/2021 89.6 79.0 - 97.0 fL Final          Sodium Sodium   Date Value Ref Range Status   07/17/2021 121 (L) 136 - 145 mmol/L Final   07/16/2021 120 (L) 136 - 145 mmol/L Final   07/16/2021 120 (L) 136 - 145 mmol/L Final   07/16/2021 119 (C) 136 - 145 mmol/L Final   07/15/2021 127 (L) 136 - 145 mmol/L Final      Potassium Potassium   Date Value Ref Range Status   07/17/2021 4.3 3.5 - 5.2 mmol/L Final   07/16/2021 3.3 (L) 3.5 - 5.2 mmol/L Final   07/16/2021 3.7 3.5 - 5.2 mmol/L Final   07/15/2021 3.6 3.5 - 5.2 mmol/L Final      Chloride Chloride   Date Value Ref Range Status   07/17/2021 82 (L) 98 - 107 mmol/L Final   07/16/2021 78 (L) 98 - 107 mmol/L Final   07/16/2021 79 (L) 98 -  107 mmol/L Final   07/15/2021 85 (L) 98 - 107 mmol/L Final      CO2 CO2   Date Value Ref Range Status   07/17/2021 29.1 (H) 22.0 - 29.0 mmol/L Final   07/16/2021 31.9 (H) 22.0 - 29.0 mmol/L Final   07/16/2021 30.8 (H) 22.0 - 29.0 mmol/L Final   07/15/2021 36.8 (H) 22.0 - 29.0 mmol/L Final      BUN BUN   Date Value Ref Range Status   07/17/2021 6 (L) 8 - 23 mg/dL Final   07/16/2021 9 8 - 23 mg/dL Final   07/16/2021 10 8 - 23 mg/dL Final   07/15/2021 11 8 - 23 mg/dL Final      Creatinine Creatinine   Date Value Ref Range Status   07/17/2021 0.25 (L) 0.57 - 1.00 mg/dL Final   07/16/2021 0.30 (L) 0.57 - 1.00 mg/dL Final   07/16/2021 0.27 (L) 0.57 - 1.00 mg/dL Final   07/15/2021 0.35 (L) 0.57 - 1.00 mg/dL Final      Calcium Calcium   Date Value Ref Range Status   07/17/2021 7.9 (L) 8.6 - 10.5 mg/dL Final   07/16/2021 8.0 (L) 8.6 - 10.5 mg/dL Final   07/16/2021 7.8 (L) 8.6 - 10.5 mg/dL Final   07/15/2021 8.0 (L) 8.6 - 10.5 mg/dL Final      PO4 No results found for: CAPO4   Albumin No results found for: ALBUMIN   Magnesium Magnesium   Date Value Ref Range Status   07/17/2021 2.2 1.6 - 2.4 mg/dL Final   07/16/2021 1.7 1.6 - 2.4 mg/dL Final   07/15/2021 2.3 1.6 - 2.4 mg/dL Final      Uric Acid Uric Acid   Date Value Ref Range Status   07/17/2021 4.4 2.4 - 5.7 mg/dL Final   07/16/2021 4.4 2.4 - 5.7 mg/dL Final   07/15/2021 5.4 2.4 - 5.7 mg/dL Final        Medication Review:     Current Facility-Administered Medications:   •  acetaminophen (TYLENOL) tablet 650 mg, 650 mg, Oral, Q6H PRN, Katarzyna Wright, RILEY, 650 mg at 07/11/21 2125  •  aspirin EC tablet 81 mg, 81 mg, Oral, Daily, Abhi Hernandez MD, 81 mg at 07/17/21 0916  •  atorvastatin (LIPITOR) tablet 80 mg, 80 mg, Oral, Nightly, Abhi Hernandez MD, 80 mg at 07/16/21 2054  •  bisacodyl (DULCOLAX) suppository 10 mg, 10 mg, Rectal, Daily PRN, Abhi Hernandez MD, 10 mg at 07/13/21 1115  •  castor oil-balsam peru (VENELEX) ointment, , Topical, Q12H, Mary  Abhi LEWIS MD, 5 g at 07/17/21 0917  •  ceFAZolin (ANCEF) IVPB 1 g, 1 g, Intravenous, Q8H, Abhi Hernandez MD, 1 g at 07/17/21 0202  •  HYDROcodone-acetaminophen (NORCO) 7.5-325 MG per tablet 1 tablet, 1 tablet, Oral, Q4H PRN, Abhi Hernandez MD, 1 tablet at 07/17/21 0916  •  hydrocortisone-bacitracin-zinc oxide-nystatin (MAGIC BARRIER) ointment 1 application, 1 application, Topical, 4x Daily, Abhi Hernandez MD, 1 application at 07/17/21 0859  •  HYDROmorphone (DILAUDID) injection 0.5 mg, 0.5 mg, Intravenous, Q2H PRN, Abhi Hernandez MD, 0.5 mg at 07/16/21 1927  •  LORazepam (ATIVAN) tablet 0.5 mg, 0.5 mg, Oral, Q6H PRN, 0.5 mg at 07/16/21 1621 **OR** LORazepam (ATIVAN) injection 0.5 mg, 0.5 mg, Intravenous, Q6H PRN, Abhi Hernandez MD  •  Magnesium Sulfate 2 gram Bolus, followed by 8 gram infusion (total Mg dose 10 grams)- Mg less than or equal to 1mg/dL, 2 g, Intravenous, PRN **OR** Magnesium Sulfate 2 gram / 50mL Infusion (GIVE X 3 BAGS TO EQUAL 6GM TOTAL DOSE) - Mg 1.1 - 1.5 mg/dl, 2 g, Intravenous, PRN **OR** Magnesium Sulfate 4 gram infusion- Mg 1.6-1.9 mg/dL, 4 g, Intravenous, PRN, Abhi Hernandez MD, Last Rate: 25 mL/hr at 07/16/21 1042, 4 g at 07/16/21 1042  •  metoprolol tartrate (LOPRESSOR) tablet 25 mg, 25 mg, Oral, Q12H, Chitra Noble MD, 25 mg at 07/17/21 0916  •  nitroglycerin (NITROSTAT) SL tablet 0.4 mg, 0.4 mg, Sublingual, Q5 Min PRN, Chitra Noble MD  •  nystatin (MYCOSTATIN) powder, , Topical, Q12H, Abhi Hernandez MD, Given at 07/16/21 2054  •  ondansetron (ZOFRAN) injection 4 mg, 4 mg, Intravenous, Q6H PRN, Chitra Noble MD  •  polyethylene glycol (MIRALAX) packet 17 g, 17 g, Oral, Daily, Abhi Hernandez MD, 17 g at 07/17/21 0916  •  potassium chloride (K-DUR,KLOR-CON) ER tablet 40 mEq, 40 mEq, Oral, PRN, 40 mEq at 07/16/21 1622 **OR** potassium chloride (KLOR-CON) packet 40 mEq, 40 mEq, Oral, PRN **OR** potassium chloride 10 mEq in 100 mL IVPB, 10 mEq,  Intravenous, Q1H PRN, Abhi Hernandez MD  •  sennosides-docusate (PERICOLACE) 8.6-50 MG per tablet 2 tablet, 2 tablet, Oral, BID, Abhi Hernandez MD, 2 tablet at 07/17/21 0916  •  sodium chloride 0.9 % flush 10 mL, 10 mL, Intravenous, Q12H, Chitra Noble MD, 10 mL at 07/16/21 2055  •  sodium chloride 0.9 % flush 10 mL, 10 mL, Intravenous, PRN, Chitra Noble MD    Assessment/Plan       Hyponatremia    CHF (congestive heart failure) (CMS/HCC)    Pneumonia    Anasarca    Elevated troponin    Weakness    Fall      Assessment & Plan  1. Hyponatremia   2. Hematuria- new  3. CHF  4. Anasarca   5. PNA    Na improved a bit to 121 with small amount of IVF.   She still seems overloaded, will wait for next na check to make decision: IVF/lasix or just watch   Continue serial Na checks   She needs nutrition   Will follow.       Bernadine Cox MD  07/17/21  10:06 EDT

## 2021-07-17 NOTE — PROGRESS NOTES
Name: Erum Yang ADMIT: 7/10/2021   : 1933  PCP: Hank Saunders MD    MRN: [id number] LOS: 7 days   AGE/SEX: 87 y.o. female  ROOM: E4/   Subjective   Chief Complaint   Patient presents with   • Fall   • Edema      She has further discussed her wishes with family and would like to pursue palliative and comfort goals of care at this time.  She reports that she does not feel like she is going to be able to recover from her current condition with the heart failure hyponatremia and recent spinal fractures.  She is having severe pain and wants to focus on pain and symptom control.  Family at bedside was in agreement with her wishes.  Discussed transferring her [place] [facility] and starting the palliative medications as needed.  She would like to do that.    Objective   Vital Signs  Temp:  [97.7 °F (36.5 °C)-98.7 °F (37.1 °C)] 98.2 °F (36.8 °C)  Heart Rate:  [77-81] 79  Resp:  [16-18] 18  BP: (116-151)/(53-85) 135/64  SpO2:  [90 %-93 %] 90 %  on  Flow (L/min):  [2-4] 4;   Device (Oxygen Therapy): humidified;nasal cannula  Body mass index is 24.96 kg/m².    Physical Exam  Vitals and nursing note reviewed.   Constitutional:       General: She is in acute distress (2/2 pain).      Appearance: She is ill-appearing. She is not diaphoretic.   HENT:      Head: Normocephalic and atraumatic.   Eyes:      Extraocular Movements: Extraocular movements intact.      Conjunctiva/sclera: Conjunctivae normal.   Cardiovascular:      Rate and Rhythm: Normal rate and regular rhythm.      Pulses: Normal pulses.   Pulmonary:      Effort: Pulmonary effort is normal.      Breath sounds: Decreased breath sounds present. No wheezing.   Abdominal:      General: There is no distension.      Palpations: Abdomen is soft.   Musculoskeletal:         General: Tenderness present.      Cervical back: Neck supple. No tenderness.      Right lower leg: Edema present.      Left lower leg: Edema present.      Comments: 2-3+   Skin:     General:  Skin is warm and dry.      Findings: Bruising (hematoma right calf) and erythema (RLE) present.   Neurological:      Mental Status: She is alert. Mental status is at baseline.   Psychiatric:         Mood and Affect: Mood normal.         Behavior: Behavior normal.         Results Review:       I reviewed the patient's new clinical results.      Results from last 7 days   Lab Units 07/16/21  0314 07/15/21  0314 07/13/21  0324 07/12/21  0403   WBC 10*3/mm3 10.09 8.61 9.07 8.11   HEMOGLOBIN g/dL 11.3* 10.9* 11.8* 11.7*   PLATELETS 10*3/mm3 163 189 233 234     Results from last 7 days   Lab Units 07/17/21  0810 07/16/21  1150 07/16/21  0757 07/16/21  0314 07/15/21  0314   SODIUM mmol/L 121* 120* 120* 119* 127*   POTASSIUM mmol/L 4.3 3.3*  --  3.7 3.6   CHLORIDE mmol/L 82* 78*  --  79* 85*   CO2 mmol/L 29.1* 31.9*  --  30.8* 36.8*   BUN mg/dL 6* 9  --  10 11   CREATININE mg/dL 0.25* 0.30*  --  0.27* 0.35*   GLUCOSE mg/dL 70 96  --  84 77   Estimated Creatinine Clearance: 56.5 mL/min (A) (by C-G formula based on SCr of 0.25 mg/dL (L)).  Results from last 7 days   Lab Units 07/17/21  0810 07/16/21  1150 07/16/21  0314 07/15/21  0314 07/14/21  0455 07/11/21  0448 07/10/21  1618 07/10/21  1618   CALCIUM mg/dL 7.9* 8.0* 7.8* 8.0* 8.2* 8.4*  --  9.0   ALBUMIN g/dL  --   --   --   --   --  2.90*  --  3.40*   MAGNESIUM mg/dL 2.2  --  1.7 2.3 2.6*  --    < >  --     < > = values in this interval not displayed.     Results from last 7 days   Lab Units 07/10/21  1618   INR  1.04        aspirin, 81 mg, Oral, Daily  atorvastatin, 80 mg, Oral, Nightly  castor oil-balsam peru, , Topical, Q12H  ceFAZolin, 1 g, Intravenous, Q8H  hydrocortisone-bacitracin-zinc oxide-nystatin, 1 application, Topical, 4x Daily  metoprolol tartrate, 25 mg, Oral, Q12H  nystatin, , Topical, Q12H  polyethylene glycol, 17 g, Oral, Daily  senna-docusate sodium, 2 tablet, Oral, BID  sodium chloride, 10 mL, Intravenous, Q12H       Diet Regular; Cardiac, Daily Fluid  Restriction; 1500 mL Fluid Per Day    Assessment/Plan      Active Hospital Problems    Diagnosis  POA   • Hyponatremia [E87.1]  Yes   • CHF (congestive heart failure) (CMS/[facility]) [I50.9]  Yes   • Pneumonia [J18.9]  Yes   • Anasarca [R60.1]  Yes   • Elevated troponin [R77.8]  Yes   • Weakness [R53.1]  Yes   • Fall [W19.XXXA]  Yes      Resolved Hospital Problems   No resolved problems to display.       · Transfer to [facility].  Change code status to DNR/comfort measures and going to start palliative measures.  I have stopped some of her chronic medications but she can withdraw other medications and antibiotics if she would like.  Will consult hospice and plan to monitor her progression.  Appreciate cardiology, neurology, and surgery services evaluations.  · Acute Diastolic Heart Failure/Palpitations  · Hyponatremia  · AHRF  · Elevated DDimer  · Right Calf Hematoma/Cellulitis  · Constipation  · S3 Fracture, T11 fracture, Degenerative changes  · Disposition: TBD    Abhi Hernandez MD  [facility]  07/17/21  15:59 EDT    Dictated portions using Dragon dictation software.    During the entire encounter, I was wearing recommended PPE including face mask and eye protection. Hand sanitization was performed prior to entering room and upon exit.

## 2021-07-17 NOTE — SIGNIFICANT NOTE
07/17/21 1513   OTHER   Discipline physical therapist   Rehab Time/Intention   Session Not Performed patient/family declined treatment   Recommendation   PT - Next Appointment 07/18/21

## 2021-07-17 NOTE — PLAN OF CARE
Goal Outcome Evaluation:  Plan of Care Reviewed With: patient           Outcome Summary: No major issues over shift, pt still on oxygen, now on 4 L, remains alert and oriented, family at bedside, pt has made the decision to go palliative, transfer order in and report called to Evelina, waiting on transport, will continue to monitor while here

## 2021-07-17 NOTE — PLAN OF CARE
Goal Outcome Evaluation:  Plan of Care Reviewed With: patient           Outcome Summary: bedside swallow eval completed. no overt s/s of aspiration with thins, puree, reg solids. limited trials given d/t pt c/o nausea. pt also could not tolerate HOB at 90' so PO given at ~45'. REC: continue current diet, meds with thin or puree as tolerated, small bites/drinks, up as high as tolerated. SLP to s/o.

## 2021-07-18 PROBLEM — S32.010D COMPRESSION FRACTURE OF L1 VERTEBRA WITH ROUTINE HEALING: Status: ACTIVE | Noted: 2021-01-01

## 2021-07-18 PROBLEM — I50.32 CHRONIC DIASTOLIC CONGESTIVE HEART FAILURE (HCC): Status: ACTIVE | Noted: 2021-01-01

## 2021-07-18 PROBLEM — S32.120D: Status: ACTIVE | Noted: 2021-01-01

## 2021-07-18 PROBLEM — E46 HYPOALBUMINEMIA DUE TO PROTEIN-CALORIE MALNUTRITION (HCC): Status: ACTIVE | Noted: 2021-01-01

## 2021-07-18 PROBLEM — Z98.890 HISTORY OF ARTHROPLASTY OF RIGHT HIP: Status: ACTIVE | Noted: 2019-09-06

## 2021-07-18 PROBLEM — S22.31XD CLOSED FRACTURE OF ONE RIB OF RIGHT SIDE WITH ROUTINE HEALING: Status: ACTIVE | Noted: 2021-01-01

## 2021-07-18 PROBLEM — L03.115 CELLULITIS OF LEG WITHOUT FOOT, RIGHT: Status: ACTIVE | Noted: 2021-01-01

## 2021-07-18 PROBLEM — S22.080D COMPRESSION FRACTURE OF T11 VERTEBRA WITH ROUTINE HEALING: Status: ACTIVE | Noted: 2021-01-01

## 2021-07-18 PROBLEM — S80.11XD HEMATOMA OF LEG, RIGHT, SUBSEQUENT ENCOUNTER: Status: ACTIVE | Noted: 2021-01-01

## 2021-07-18 PROBLEM — E88.09 HYPOALBUMINEMIA DUE TO PROTEIN-CALORIE MALNUTRITION (HCC): Status: ACTIVE | Noted: 2021-01-01

## 2021-07-18 PROBLEM — Z51.5 PALLIATIVE CARE BY SPECIALIST: Status: ACTIVE | Noted: 2021-01-01

## 2021-07-18 PROBLEM — I27.20 PULMONARY HYPERTENSION (HCC): Status: ACTIVE | Noted: 2021-01-01

## 2021-07-18 PROBLEM — J90 PLEURAL EFFUSION, BILATERAL: Status: ACTIVE | Noted: 2021-01-01

## 2021-07-18 NOTE — PROGRESS NOTES
Nephrology Note    Chart reviewed.   Patient has been transitioned to palliative/comfort care only.   Will sign off, but please do not hesitate to call us if we can be of any assistance.       Bernadine Cox MD  [facility]

## 2021-07-18 NOTE — PLAN OF CARE
Goal Outcome  Patient is alert and oriented. Pain and anxiety medication given as ordered.Turn every two hours. Will monitor

## 2021-07-18 NOTE — PLAN OF CARE
Goal Outcome Evaluation:  Plan of Care Reviewed With: patient        Progress: no change  Outcome Summary: Pt. continued with palliative care.  Medicated for pain and discomfort with morphine and ativan.  Mcneil catheter placed for urinary retention and comfort.  Family at bedside.  Will continue to monitor.

## 2021-07-19 NOTE — CASE MANAGEMENT/SOCIAL WORK
Case Management Discharge Note      Final Note: Patient  in our facility on 2021    Provided Post Acute Provider List?: Yes  Post Acute Provider List: Nursing Home, Home Health  Provided Post Acute Provider Quality & Resource List?: Yes  Post Acute Provider Quality and Resource List: Home Health, Nursing Home  Delivered To: Patient, Support Person  Support Person: sister and nephew  Method of Delivery: In person    Selected Continued Care - Discharged on 2021 Admission date: 7/10/2021 - Discharge disposition:     Destination    No services have been selected for the patient.              Durable Medical Equipment    No services have been selected for the patient.              Dialysis/Infusion    No services have been selected for the patient.              Home Medical Care Coordination complete    Service Provider Selected Services Address Phone Fax Patient Preferred    [facility]  Home Health Services [address] [place] [id number] [phone] --          Therapy    No services have been selected for the patient.              Community [facility]    No services have been selected for the patient.              [facility]    No services have been selected for the patient.                       Final Discharge Disposition Code: 41 -  in medical facility

## 2021-07-19 NOTE — DISCHARGE SUMMARY
Discharge As      Date of Admisssion:  7/10/2021  Date of Death:  2021  Time of Death:  4:15 AM    Patient Care Team:  Hank Saunders MD as PCP - General (Family Medicine)  Shawn Parrish MD as Consulting Physician (Hematology and Oncology)  Santosh Flores MD as Attending Provider (Hospice and Palliative Medicine)    Final Diagnosis:     Hyponatremia    Pulmonary hypertension (CMS/HCC)    Chronic diastolic congestive heart failure (CMS/HCC)    Palliative care by specialist    Weakness    Fall    Cellulitis of leg without foot, right    Hematoma of leg, right, subsequent encounter    Closed nondisplaced zone II fracture of sacrum with routine healing    Compression fracture of T11 vertebra with routine healing    Compression fracture of L1 vertebra with routine healing    Closed fracture of 12th rib of right side with routine healing    History of arthroplasty of right hip    Anasarca    Hypoalbuminemia due to protein-calorie malnutrition (CMS/HCC)    Pleural effusion, bilateral      Hospital Course  Patient was a 87 y.o. female who I initially saw yesterday. Please see my H&P 2021 at 0745. [facility] consult placed 2021 at 1814 hours. At the time of death, hospice had not evaluated. Prior to seeing the patient this AM, I was called that the patient's respirations ceased and no pulse palpable. No heart sounds audible. I pronounced the patient at 0415 hours.        Santosh Flores MD  Hospice and Palliative Medicine  21  09:03 EDT
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
## (undated) DEVICE — PK HIP TOTL 40

## (undated) DEVICE — ANTIBACTERIAL UNDYED BRAIDED (POLYGLACTIN 910), SYNTHETIC ABSORBABLE SUTURE: Brand: COATED VICRYL

## (undated) DEVICE — SUT VIC 0 CT1 36IN J946H

## (undated) DEVICE — PK ATS CUST W CARDIOTOMY RESEVOIR

## (undated) DEVICE — APPL DURAPREP IODOPHOR APL 26ML

## (undated) DEVICE — SUT PDS 1 CT1 36IN Z347H

## (undated) DEVICE — 3M™ IOBAN™ 2 ANTIMICROBIAL INCISE DRAPE 6650EZ: Brand: IOBAN™ 2

## (undated) DEVICE — GLV SURG SENSICARE W/ALOE PF LF 8 STRL

## (undated) DEVICE — PREMIUM WET SKIN PREP TRAY: Brand: MEDLINE INDUSTRIES, INC.

## (undated) DEVICE — GLV SURG SENSICARE W/ALOE PF LF 7.5 STRL

## (undated) DEVICE — REFLECTION FLEXIBLE DRILL 25MM: Brand: REFLECTION

## (undated) DEVICE — SYR LUERLOK 30CC

## (undated) DEVICE — MAT FLR ABSORBENT LG 4FT 10 2.5FT

## (undated) DEVICE — SOL NACL 0.9PCT 100ML SGL

## (undated) DEVICE — 3M™ IOBAN™ 2 ANTIMICROBIAL INCISE DRAPE 6640EZ: Brand: IOBAN™ 2

## (undated) DEVICE — GLV SURG SENSICARE PI PF LF 7 GRN STRL

## (undated) DEVICE — HANDPIECE SET WITH COAXIAL HIGH FLOW TIP AND SUCTION TUBE: Brand: INTERPULSE

## (undated) DEVICE — GLV SURG PREMIERPRO ORTHO LTX PF SZ7.5 BRN

## (undated) DEVICE — GLV SURG SENSICARE MICRO PF LF 7 STRL

## (undated) DEVICE — SPNG GZ WOVN 4X4IN 12PLY 10/BX STRL

## (undated) DEVICE — PREP SOL POVIDONE/IODINE BT 4OZ

## (undated) DEVICE — DRSNG GZ PETROLTM XEROFORM CURAD 1X8IN STRL

## (undated) DEVICE — SUT VIC 1 CT1 36IN J947H